# Patient Record
Sex: MALE | Race: BLACK OR AFRICAN AMERICAN | NOT HISPANIC OR LATINO | Employment: FULL TIME | URBAN - METROPOLITAN AREA
[De-identification: names, ages, dates, MRNs, and addresses within clinical notes are randomized per-mention and may not be internally consistent; named-entity substitution may affect disease eponyms.]

---

## 2019-05-07 ENCOUNTER — OFFICE VISIT CONVERTED (OUTPATIENT)
Dept: FAMILY MEDICINE CLINIC | Facility: CLINIC | Age: 54
End: 2019-05-07
Attending: PHYSICIAN ASSISTANT

## 2021-01-05 ENCOUNTER — CONVERSION ENCOUNTER (OUTPATIENT)
Dept: FAMILY MEDICINE CLINIC | Facility: CLINIC | Age: 56
End: 2021-01-05

## 2021-01-05 ENCOUNTER — OFFICE VISIT CONVERTED (OUTPATIENT)
Dept: FAMILY MEDICINE CLINIC | Facility: CLINIC | Age: 56
End: 2021-01-05
Attending: PHYSICIAN ASSISTANT

## 2021-01-08 ENCOUNTER — OFFICE VISIT CONVERTED (OUTPATIENT)
Dept: ORTHOPEDIC SURGERY | Facility: CLINIC | Age: 56
End: 2021-01-08
Attending: ORTHOPAEDIC SURGERY

## 2021-05-10 NOTE — H&P
History and Physical      Patient Name: Jai Dow   Patient ID: [id number]   Sex: Male   YOB: 1965    Primary Care Provider: Cuauhtemoc Padilla PA-C   Referring Provider: Cuauhtemoc Padilla PA-C    Visit Date: January 8, 2021    Provider: Rex Peterson MD   Location: [facility]   Location Address: [address] [place] [address]  [id number]   Location Phone: [phone]          Chief Complaint  · Right wrist pain      History Of Present Illness  aJi Dow is a 55 year old /Black male who presents today to [facility].      Patient presents today for an evaluation of right wrist pain. Patient has no known injury or trauma to his right wrist. He states that he had a sudden onset of pain that started occurring a couple of months ago. Patient currently works at home where he does a lot of typing. Patient has to take breaks in-between typing due to the wrist pain. In the last week, patient states his wrist got stuck and he couldn't move it. Patient has noticed swelling in his right wrist in the past 2 weeks. He states he has tried buying multiple different items to keep his wrist comfortable while typing but they have been ineffective.       Past Medical History  Arthritis; Bilateral knee pain; Hypertension; Insomnia; Migraine; PTSD         Past Surgical History  Colonoscopy; Left total knee replacement; Right total knee replacement         Medication List  amitriptyline 10 mg oral tablet; cyclobenzaprine 10 mg oral tablet; hydrochlorothiazide 25 mg oral tablet; ibuprofen 800 mg oral tablet; lisinopril 40 mg oral tablet; Medrol (Chance) 4 mg oral tablets,dose pack; prazosin 1 mg oral capsule; sertraline 100 mg oral tablet; sertraline 25 mg oral tablet; Voltaren 1 % topical gel; Zofran 4 mg oral tablet; zolmitriptan 2.5 mg oral tablet,disintegrating         Allergy List  NO KNOWN DRUG ALLERGIES       Allergies Reconciled  Family Medical History  Breast Neoplasm, Malignant;  "CVA (Cerebrovascular accident); Hypertension         Social History  Alcohol (Never); ; No known infection risk; Tobacco (Former)         Immunizations  Name Date Admin   Influenza 01/05/2021   Influenza 10/01/2018         Review of Systems  · Constitutional  o Denies  o : fever, chills, weight loss  · Cardiovascular  o Denies  o : chest pain, shortness of breath  · Gastrointestinal  o Denies  o : liver disease, heartburn, nausea, blood in stools  · Genitourinary  o Denies  o : painful urination, blood in urine  · Integument  o Denies  o : rash, itching  · Neurologic  o Denies  o : headache, weakness, loss of consciousness  · Musculoskeletal  o Denies  o : painful, swollen joints  · Psychiatric  o Denies  o : drug/alcohol addiction, anxiety, depression      Vitals  Date Time BP Position Site L\R Cuff Size HR RR TEMP (F) WT  HT  BMI kg/m2 BSA m2 O2 Sat FR L/min FiO2 HC       01/08/2021 03:18 PM      75 - R   222lbs 0oz 5'  11\" 30.96 2.25 98 %            Physical Examination  · Constitutional  o Appearance  o : well developed, well-nourished, no obvious deformities present  · Head and Face  o Head  o :   § Inspection  § : normocephalic  o Face  o :   § Inspection  § : no facial lesions  · Eyes  o Conjunctivae  o : conjunctivae normal  o Sclerae  o : sclerae white  · Ears, Nose, Mouth and Throat  o Ears  o :   § External Ears  § : appearance within normal limits  § Hearing  § : intact  o Nose  o :   § External Nose  § : appearance normal  · Neck  o Inspection/Palpation  o : normal appearance  o Range of Motion  o : full range of motion  · Respiratory  o Respiratory Effort  o : breathing unlabored  o Inspection of Chest  o : normal appearance  o Auscultation of Lungs  o : no audible wheezing or rales  · Cardiovascular  o Heart  o : regular rate  · Gastrointestinal  o Abdominal Examination  o : soft and non-tender  · Skin and Subcutaneous Tissue  o General Inspection  o : intact, no " rashes  · Psychiatric  o General  o : Alert and oriented x3  o Judgement and Insight  o : judgment and insight intact  o Mood and Affect  o : mood normal, affect appropriate  · Right Wrist  o Inspection  o : Sensation grossly intact. Neurovascular intact. Skin intact. Tender to palpation of the wrist. Swelling over first dorsal wrist compartment. Positive Finkelsteins. Full flexion and extension with pain of the wrist. Patient able to make a fist and wiggle fingers. No skin discoloration or atrophy. Radial pulse 2+, ulnar pulse 2+.  · Injection Note/Aspiration Note  o Site  o : right wrist  o Procedure  o : Procedure: After educating the patient, patient gave consent for procedure. After using Chloraprep, the joint space was injected. The patient tolerated the procedure well.   o Medication  o : 80 mg of DepoMedrol with 1cc of 1% Lidocaine  · In Office Procedures  o View  o : AP/LATERAL  o Site  o : right, wrist   o Indication  o : Right arm pain   o Study  o : X-rays ordered, taken in the office, and reviewed today.  o Xray  o : No osseous abnormalities of the right wrist.           Assessment  · Pain: Wrist     719.43/M25.539  · De Quervain's tenosynovitis, right     727.04/M65.4      Plan  · Orders  o Depo-Medrol injection 80mg () - 719.43/M25.539 - 01/08/2021   Lot [id number] Exp 11 2021 [employer] Administered by RADHA Peterson MD  o Arthrocentesis of wrist (20605) - 719.43/M25.539 - 01/08/2021   Lot [id number] Exp 03 01 2022 [employer] Administered by RADHA Peterson MD  o Wrist (Right) 2 views X-Ray [facility] ([id number]) - 719.43/M25.539 - 01/08/2021  · Medications  o Medications have been Reconciled  o Transition of Care or Provider Policy  · Instructions  o Dr. Peterson saw and examined the patient and agrees with plan.   o X-rays reviewed by Dr. Peterson.  o Reviewed the patient's Past Medical, Social, and Family history as well as the ROS at today's visit, no changes.  o Call or return if worsening symptoms.  o Exercise handout  given.  o Follow Up PRN.  o This note was transcribed by Radha De Leon. demetris  o Discussed diagnosis and treatment options with the patient. Patient received a De Quervain's tenosynovitis injection in the right wrist and tolerated it well.   · Referrals  o ID: [id number] Date: 01/07/2021 Type: Inbound  Specialty: Orthopedic Surgery            Electronically Signed by: Radha De Leon-, Other -Author on January 11, 2021 08:25:25 AM  Electronically Co-signed by: Rex Peterson MD -Reviewer on January 11, 2021 10:35:54 PM

## 2021-05-14 VITALS — WEIGHT: 222 LBS | BODY MASS INDEX: 31.08 KG/M2 | HEIGHT: 71 IN | HEART RATE: 75 BPM | OXYGEN SATURATION: 98 %

## 2021-05-14 VITALS
SYSTOLIC BLOOD PRESSURE: 124 MMHG | HEART RATE: 84 BPM | DIASTOLIC BLOOD PRESSURE: 86 MMHG | OXYGEN SATURATION: 98 % | HEIGHT: 70 IN | WEIGHT: 228.19 LBS | BODY MASS INDEX: 32.67 KG/M2

## 2021-05-14 NOTE — PROGRESS NOTES
Progress Note      Patient Name: Jai Dow   Patient ID: [id number]   Sex: Male   YOB: 1965    Primary Care Provider: Cuauhtemoc Padilla PA-C   Referring Provider: Cuauhtemoc Padilla PA-C    Visit Date: January 5, 2021    Provider: Cuauhtemoc Padilla PA-C   Location: [facility]   Location Address: [address] [place] [address], [address]  [place], [place]  [id number]   Location Phone: [phone]          Chief Complaint  · (R) Wrist pain      History Of Present Illness  Jai Dow is a 55 year old /Black male who presents for evaluation and treatment of: right wrist pain      Pt presents today for (R) wrist pain, pt states this started a couple of months ago.     Pt denies any injury. Pt describes the pain as achy and sore, rates it a 4/10. Pt states he has trouble moving his wrist, he will get a sharp, tingly feeling with motion. Pt has been wearing a brace on wrist for the past month.     Pt is requesting lab work, wanting PSA checked.     Flu-today in office    Pt has been using splint and ice and motrin 800mg it has helped minimally  He denies any spec injury but uses his keyboard more than ever.       Past Medical History  Disease Name Date Onset Notes   Arthritis --  --    Bilateral knee pain --  --    Hypertension --  --    Insomnia --  --    Migraine --  --    PTSD --  --          Past Surgical History  Procedure Name Date Notes   Colonoscopy 2015 Normal per pt ([facility])   Left total knee replacement 2018 Dr. Leopoldo Jacobsen ([facility])   Right total knee replacement 2017 Dr. Leopoldo Jacobsen ([facility])         Medication List  Name Date Started Instructions   amitriptyline 10 mg oral tablet  take 1 tablet by oral route 2 times a day   cyclobenzaprine 10 mg oral tablet  take 1 tablet (10 mg) by oral route 3 times per day   hydrochlorothiazide 25 mg oral tablet  take 1 tablet (25 mg) by oral route once daily   ibuprofen 800 mg oral tablet  take 1 tablet (800  "mg) by oral route 3 times per day with food   lisinopril 40 mg oral tablet  take 1 tablet (40 mg) by oral route once daily   prazosin 1 mg oral capsule  take 1 capsule by oral route daily   sertraline 100 mg oral tablet  take 1 tab (125MG total) by oral route once daily   sertraline 25 mg oral tablet  take 1 tab (125MG total) by oral route once daily   Voltaren 1 % topical gel  apply 2 gram to the affected area(s) by topical route 4 times per day   Zofran 4 mg oral tablet  take 1 tablet by oral route PRN for migraines   zolmitriptan 2.5 mg oral tablet,disintegrating  dissolve 1 tablet by oral route once         Allergy List  Allergen Name Date Reaction Notes   NO KNOWN DRUG ALLERGIES --  --  --          Family Medical History  Disease Name Relative/Age Notes   Breast Neoplasm, Malignant Mother/   --    CVA (Cerebrovascular accident) Brother/  Father/  Mother/   --    Hypertension Father/   --          Social History  Finding Status Start/Stop Quantity Notes   Alcohol Never --/-- --  --     --  --/-- --  --    No known infection risk --  --/-- --  --    Tobacco Former 20/40 0.5 PPD --          Immunizations  NameDate Admin Mfg Trade Name Lot Number Route Inj VIS Given VIS Publication   [id number]/05/2021 [facility] Fluzone Quadrivalent [id number] IM LD 01/05/2021 08/15/2019   Comments: pt tolerated well         Review of Systems  · Constitutional  o Denies  o : fever, fatigue, weight loss, weight gain  · Cardiovascular  o Denies  o : lower extremity edema, claudication, chest pressure, palpitations  · Respiratory  o Denies  o : shortness of breath, wheezing, cough, hemoptysis, dyspnea on exertion  · Gastrointestinal  o Denies  o : nausea, vomiting, diarrhea, constipation, abdominal pain      Vitals  Date Time BP Position Site L\R Cuff Size HR RR TEMP (F) WT  HT  BMI kg/m2 BSA m2 O2 Sat FR L/min FiO2 HC       01/05/2021 10:00 /86 Sitting    84 - R   228lbs 3.2oz 5'  10.5\" 32.28 2.27 98 %            Physical " Examination  · Constitutional  o Appearance  o : well developed, well-nourished, no acute distress  · Head and Face  o Head  o : normocephalic, atraumatic  · Neck  o Inspection/Palpation  o : normal appearance, no masses or tenderness, trachea midline  o Thyroid  o : gland size normal, nontender, no nodules or masses present on palpation  · Respiratory  o Respiratory Effort  o : breathing unlabored  o Inspection of Chest  o : chest rise symmetric bilaterally  o Auscultation of Lungs  o : clear to auscultation bilaterally throughout inspiration and expiration  · Cardiovascular  o Heart  o :   § Auscultation of Heart  § : regular rate and rhythm, no murmurs, gallops or rubs  o Peripheral Vascular System  o :   § Extremities  § : no edema  · Lymphatic  o Neck  o : no cervical lymphadenopathy, no supraclavicular lymphadenopathy  · Psychiatric  o Mood and Affect  o : mood normal, affect appropriate     Right Wrist - neg tinels, phalens, normal sonia, pos finklestein           Assessment  · Screening for depression     V79.0/Z13.89  · Need for influenza vaccination     V04.81/Z23  · Right wrist pain     719.43/M25.531  · Right De Quervain's disease (tenosynovitis)     727.04/M65.4      Plan  · Orders  o ACO-18: Negative screen for clinical depression using a standardized tool () - V79.0/Z13.89 - 01/05/2021  o Immunization Admin Fee (Single) ([facility]) (86494) - V04.81/Z23 - 01/05/2021  o Fluzone Quadrivalent Vaccine, age 6 months + (87035) - V04.81/Z23 - 01/05/2021   Vaccine - Influenza; Dose: 0.5; Site: Left Deltoid; Route: Intramuscular; Date: 01/05/2021 10:07:00; Exp: 06/30/2021; Lot: [id number]; Mfg: [employer]ofi pasteur; TradeName: Fluzone Quadrivalent; Administered By: Hira Goyal MA; Comment: pt tolerated well  o ACO-14: Influenza immunization administered or previously received () - V04.81/Z23 - 01/05/2021  o ACO-39: Current medications updated and reviewed (1159F, ) - - 01/05/2021  o PHYSICAL  THERAPY/OCCUPATIONAL THERAPY CONSULTATION (Evaluate and Treat) (PTOTR) - 727.04/M65.4 - 01/05/2021  o ORTHOPEDIC CONSULTATION (ORTHO) - 727.04/M65.4 - 01/05/2021  · Medications  o Medrol (Chance) 4 mg oral tablets,dose pack   SIG: take by oral route as directed per package instructions   DISP: (1) Packet with 0 refills  Prescribed on 01/05/2021     o Medications have been Reconciled  o Transition of Care or Provider Policy  · Instructions  o Depression Screen completed and scanned into the EMR under the designated folder within the patient's documents.  o Today's PHQ-9 result is _9__  o Take all medications as prescribed/directed.  o Patient was educated/instructed on their diagnosis, treatment and medications prior to discharge from the clinic today.  o Discussed Covid-19 precautions including, but not limited to, social distancing, avoid touching your face, and hand washing.   o RICE  · Disposition  o Call or Return if symptoms worsen or persist.  o Care Transition            Electronically Signed by: Cuauhtemoc Padilla PA-C -Author on January 6, 2021 02:18:12 PM

## 2021-05-15 VITALS
BODY MASS INDEX: 32.14 KG/M2 | HEART RATE: 94 BPM | DIASTOLIC BLOOD PRESSURE: 79 MMHG | HEIGHT: 70 IN | WEIGHT: 224.5 LBS | SYSTOLIC BLOOD PRESSURE: 130 MMHG | OXYGEN SATURATION: 97 %

## 2022-06-27 ENCOUNTER — OFFICE VISIT (OUTPATIENT)
Dept: FAMILY MEDICINE CLINIC | Facility: CLINIC | Age: 57
End: 2022-06-27

## 2022-06-27 VITALS
WEIGHT: 215 LBS | OXYGEN SATURATION: 98 % | BODY MASS INDEX: 30.1 KG/M2 | HEIGHT: 71 IN | HEART RATE: 61 BPM | DIASTOLIC BLOOD PRESSURE: 80 MMHG | SYSTOLIC BLOOD PRESSURE: 152 MMHG

## 2022-06-27 DIAGNOSIS — J01.10 ACUTE NON-RECURRENT FRONTAL SINUSITIS: ICD-10-CM

## 2022-06-27 DIAGNOSIS — J02.9 SORE THROAT: Primary | ICD-10-CM

## 2022-06-27 PROBLEM — G43.909 MIGRAINE: Status: ACTIVE | Noted: 2022-06-27

## 2022-06-27 PROBLEM — G47.00 INSOMNIA: Status: ACTIVE | Noted: 2022-06-27

## 2022-06-27 PROBLEM — I10 HTN (HYPERTENSION): Status: ACTIVE | Noted: 2022-06-27

## 2022-06-27 PROBLEM — M19.90 ARTHRITIS: Status: ACTIVE | Noted: 2022-06-27

## 2022-06-27 PROBLEM — G47.33 OSA (OBSTRUCTIVE SLEEP APNEA): Status: ACTIVE | Noted: 2022-06-27

## 2022-06-27 PROBLEM — Z96.651 S/P TKR (TOTAL KNEE REPLACEMENT) USING CEMENT, RIGHT: Status: ACTIVE | Noted: 2017-12-28

## 2022-06-27 PROBLEM — F43.10 POSTTRAUMATIC STRESS DISORDER: Status: ACTIVE | Noted: 2022-06-27

## 2022-06-27 LAB
EXPIRATION DATE: NORMAL
EXPIRATION DATE: NORMAL
FLUAV AG UPPER RESP QL IA.RAPID: NOT DETECTED
FLUBV AG UPPER RESP QL IA.RAPID: NOT DETECTED
INTERNAL CONTROL: NORMAL
INTERNAL CONTROL: NORMAL
Lab: NORMAL
Lab: NORMAL
S PYO AG THROAT QL: NEGATIVE
SARS-COV-2 AG UPPER RESP QL IA.RAPID: NOT DETECTED

## 2022-06-27 PROCEDURE — 87428 SARSCOV & INF VIR A&B AG IA: CPT | Performed by: PHYSICIAN ASSISTANT

## 2022-06-27 PROCEDURE — 87880 STREP A ASSAY W/OPTIC: CPT | Performed by: PHYSICIAN ASSISTANT

## 2022-06-27 PROCEDURE — 99213 OFFICE O/P EST LOW 20 MIN: CPT | Performed by: PHYSICIAN ASSISTANT

## 2022-06-27 RX ORDER — METHOCARBAMOL 500 MG/1
500 TABLET, FILM COATED ORAL 4 TIMES DAILY
COMMUNITY
End: 2022-06-27 | Stop reason: ALTCHOICE

## 2022-06-27 RX ORDER — HYDROCHLOROTHIAZIDE 25 MG/1
TABLET ORAL
COMMUNITY

## 2022-06-27 RX ORDER — SERTRALINE HYDROCHLORIDE 100 MG/1
200 TABLET, FILM COATED ORAL
COMMUNITY

## 2022-06-27 RX ORDER — ZOLMITRIPTAN 2.5 MG/1
TABLET, ORALLY DISINTEGRATING ORAL
COMMUNITY
End: 2022-10-26

## 2022-06-27 RX ORDER — LISINOPRIL 40 MG/1
TABLET ORAL
COMMUNITY
End: 2023-01-20 | Stop reason: SDUPTHER

## 2022-06-27 RX ORDER — AZITHROMYCIN 250 MG/1
TABLET, FILM COATED ORAL
Qty: 6 TABLET | Refills: 0 | Status: SHIPPED | OUTPATIENT
Start: 2022-06-27 | End: 2022-08-25

## 2022-06-27 RX ORDER — AMITRIPTYLINE HYDROCHLORIDE 10 MG/1
TABLET, FILM COATED ORAL
COMMUNITY

## 2022-06-27 RX ORDER — PRAZOSIN HYDROCHLORIDE 2 MG/1
2 CAPSULE ORAL DAILY
COMMUNITY

## 2022-06-27 RX ORDER — IBUPROFEN 800 MG/1
TABLET ORAL
COMMUNITY

## 2022-06-27 RX ORDER — ONDANSETRON 4 MG/1
TABLET, FILM COATED ORAL
COMMUNITY

## 2022-06-27 RX ORDER — CYCLOBENZAPRINE HCL 10 MG
TABLET ORAL
COMMUNITY
End: 2022-10-26

## 2022-08-12 ENCOUNTER — HOSPITAL ENCOUNTER (EMERGENCY)
Facility: HOSPITAL | Age: 57
Discharge: HOME | End: 2022-08-12
Attending: EMERGENCY MEDICINE | Admitting: EMERGENCY MEDICINE

## 2022-08-12 VITALS
RESPIRATION RATE: 18 BRPM | BODY MASS INDEX: 30.25 KG/M2 | TEMPERATURE: 98.1 F | DIASTOLIC BLOOD PRESSURE: 80 MMHG | HEART RATE: 88 BPM | WEIGHT: 216.05 LBS | SYSTOLIC BLOOD PRESSURE: 128 MMHG | HEIGHT: 71 IN | OXYGEN SATURATION: 100 %

## 2022-08-12 DIAGNOSIS — F41.9 ANXIETY: Primary | ICD-10-CM

## 2022-08-12 DIAGNOSIS — R25.2 MUSCLE CRAMPS: ICD-10-CM

## 2022-08-12 LAB
ALBUMIN SERPL-MCNC: 4.1 G/DL (ref 3.5–5.2)
ALBUMIN/GLOB SERPL: 1.1 G/DL
ALP SERPL-CCNC: 50 U/L (ref 39–117)
ALT SERPL W P-5'-P-CCNC: 11 U/L (ref 1–41)
ANION GAP SERPL CALCULATED.3IONS-SCNC: 15.1 MMOL/L (ref 5–15)
AST SERPL-CCNC: 14 U/L (ref 1–40)
BASOPHILS # BLD AUTO: 0.07 10*3/MM3 (ref 0–0.2)
BASOPHILS NFR BLD AUTO: 1 % (ref 0–1.5)
BILIRUB SERPL-MCNC: 0.3 MG/DL (ref 0–1.2)
BUN SERPL-MCNC: 21 MG/DL (ref 6–20)
BUN/CREAT SERPL: 15.3 (ref 7–25)
CALCIUM SPEC-SCNC: 9.8 MG/DL (ref 8.6–10.5)
CHLORIDE SERPL-SCNC: 99 MMOL/L (ref 98–107)
CO2 SERPL-SCNC: 21.9 MMOL/L (ref 22–29)
CREAT SERPL-MCNC: 1.37 MG/DL (ref 0.76–1.27)
DEPRECATED RDW RBC AUTO: 37.2 FL (ref 37–54)
EGFRCR SERPLBLD CKD-EPI 2021: 59.4 ML/MIN/1.73
EOSINOPHIL # BLD AUTO: 0.14 10*3/MM3 (ref 0–0.4)
EOSINOPHIL NFR BLD AUTO: 2.1 % (ref 0.3–6.2)
ERYTHROCYTE [DISTWIDTH] IN BLOOD BY AUTOMATED COUNT: 13.1 % (ref 12.3–15.4)
GLOBULIN UR ELPH-MCNC: 3.6 GM/DL
GLUCOSE SERPL-MCNC: 206 MG/DL (ref 65–99)
HCT VFR BLD AUTO: 37.7 % (ref 37.5–51)
HGB BLD-MCNC: 13.7 G/DL (ref 13–17.7)
HOLD SPECIMEN: 11
HOLD SPECIMEN: 11
IMM GRANULOCYTES # BLD AUTO: 0.03 10*3/MM3 (ref 0–0.05)
IMM GRANULOCYTES NFR BLD AUTO: 0.4 % (ref 0–0.5)
LYMPHOCYTES # BLD AUTO: 2.05 10*3/MM3 (ref 0.7–3.1)
LYMPHOCYTES NFR BLD AUTO: 30.7 % (ref 19.6–45.3)
MAGNESIUM SERPL-MCNC: 2.1 MG/DL (ref 1.6–2.6)
MCH RBC QN AUTO: 28.7 PG (ref 26.6–33)
MCHC RBC AUTO-ENTMCNC: 36.3 G/DL (ref 31.5–35.7)
MCV RBC AUTO: 79 FL (ref 79–97)
MONOCYTES # BLD AUTO: 0.77 10*3/MM3 (ref 0.1–0.9)
MONOCYTES NFR BLD AUTO: 11.5 % (ref 5–12)
NEUTROPHILS NFR BLD AUTO: 3.62 10*3/MM3 (ref 1.7–7)
NEUTROPHILS NFR BLD AUTO: 54.3 % (ref 42.7–76)
NRBC BLD AUTO-RTO: 0 /100 WBC (ref 0–0.2)
PLATELET # BLD AUTO: 224 10*3/MM3 (ref 140–450)
PMV BLD AUTO: 10.3 FL (ref 6–12)
POTASSIUM SERPL-SCNC: 3.5 MMOL/L (ref 3.5–5.2)
PROT SERPL-MCNC: 7.7 G/DL (ref 6–8.5)
RBC # BLD AUTO: 4.77 10*6/MM3 (ref 4.14–5.8)
SODIUM SERPL-SCNC: 136 MMOL/L (ref 136–145)
TROPONIN T SERPL-MCNC: <0.01 NG/ML (ref 0–0.03)
WBC NRBC COR # BLD: 6.68 10*3/MM3 (ref 3.4–10.8)
WHOLE BLOOD HOLD COAG: 11
WHOLE BLOOD HOLD SPECIMEN: 11

## 2022-08-12 PROCEDURE — 25010000002 ORPHENADRINE CITRATE PER 60 MG: Performed by: EMERGENCY MEDICINE

## 2022-08-12 PROCEDURE — 25010000002 KETOROLAC TROMETHAMINE PER 15 MG: Performed by: EMERGENCY MEDICINE

## 2022-08-12 PROCEDURE — 80053 COMPREHEN METABOLIC PANEL: CPT | Performed by: EMERGENCY MEDICINE

## 2022-08-12 PROCEDURE — 93010 ELECTROCARDIOGRAM REPORT: CPT | Performed by: INTERNAL MEDICINE

## 2022-08-12 PROCEDURE — 99284 EMERGENCY DEPT VISIT MOD MDM: CPT

## 2022-08-12 PROCEDURE — 93005 ELECTROCARDIOGRAM TRACING: CPT | Performed by: EMERGENCY MEDICINE

## 2022-08-12 PROCEDURE — 83735 ASSAY OF MAGNESIUM: CPT | Performed by: EMERGENCY MEDICINE

## 2022-08-12 PROCEDURE — 93005 ELECTROCARDIOGRAM TRACING: CPT

## 2022-08-12 PROCEDURE — 96375 TX/PRO/DX INJ NEW DRUG ADDON: CPT

## 2022-08-12 PROCEDURE — 85025 COMPLETE CBC W/AUTO DIFF WBC: CPT

## 2022-08-12 PROCEDURE — 96374 THER/PROPH/DIAG INJ IV PUSH: CPT

## 2022-08-12 PROCEDURE — 84484 ASSAY OF TROPONIN QUANT: CPT | Performed by: EMERGENCY MEDICINE

## 2022-08-12 PROCEDURE — 36415 COLL VENOUS BLD VENIPUNCTURE: CPT

## 2022-08-12 RX ORDER — SODIUM CHLORIDE 0.9 % (FLUSH) 0.9 %
10 SYRINGE (ML) INJECTION AS NEEDED
Status: DISCONTINUED | OUTPATIENT
Start: 2022-08-12 | End: 2022-08-12 | Stop reason: HOSPADM

## 2022-08-12 RX ORDER — KETOROLAC TROMETHAMINE 30 MG/ML
30 INJECTION, SOLUTION INTRAMUSCULAR; INTRAVENOUS ONCE
Status: COMPLETED | OUTPATIENT
Start: 2022-08-12 | End: 2022-08-12

## 2022-08-12 RX ORDER — ORPHENADRINE CITRATE 30 MG/ML
60 INJECTION INTRAMUSCULAR; INTRAVENOUS ONCE
Status: COMPLETED | OUTPATIENT
Start: 2022-08-12 | End: 2022-08-12

## 2022-08-12 RX ADMIN — SODIUM CHLORIDE 1000 ML: 9 INJECTION, SOLUTION INTRAVENOUS at 03:08

## 2022-08-12 RX ADMIN — KETOROLAC TROMETHAMINE 30 MG: 30 INJECTION, SOLUTION INTRAMUSCULAR; INTRAVENOUS at 03:08

## 2022-08-12 RX ADMIN — ORPHENADRINE CITRATE 60 MG: 60 INJECTION INTRAMUSCULAR; INTRAVENOUS at 03:07

## 2022-08-12 NOTE — ED PROVIDER NOTES
Time: 2:24 AM EDT  Arrived by: ambulance  Chief Complaint: syncope, panic attack  History provided by: patient, spouse  History is limited by: not applicable     History of Present Illness:  Patient is a 57 y.o. year old male who presents to the emergency department with syncope and panic attack. Per the patient's spouse, the pt woke up from a nightmare at approximately 0100 and had a panic attack. Pt's spouse states the pt went to the bathroom after waking up. Per the pt's spouse, the pt had a whole body spasm, his legs gave out, and he passed out. Pt's spouse reports that she followed the pt into the bathroom and helped him down to the floor when he passed out. His spouse states the pt had labored breathing. Pt states he was alert during the episode, remembers what happened, denies head injury, and is not currently anxious. Pt denies nausea, vomiting, diarrhea, or chest pain.      HPI    Similar Symptoms Previously: no  Recently seen: no      Patient Care Team  Primary Care Provider: Neena Vazquez APRN    Past Medical History:     Allergies   Allergen Reactions   • Amoxil [Amoxicillin] Angioedema     Past Medical History:   Diagnosis Date   • PTSD (post-traumatic stress disorder)      History reviewed. No pertinent surgical history.  History reviewed. No pertinent family history.    Home Medications:  Prior to Admission medications    Medication Sig Start Date End Date Taking? Authorizing Provider   amitriptyline (ELAVIL) 10 MG tablet amitriptyline 10 mg oral tablet take 1 tablet by oral route 2 times a day   Active    Provider, MD [facility]   azithromycin (Zithromax Z-Chance) 250 MG tablet Take 2 tablets by mouth on day 1, then 1 tablet daily on days 2-5 6/27/22   Diana Downing PA   cyclobenzaprine (FLEXERIL) 10 MG tablet cyclobenzaprine 10 mg oral tablet take 1 tablet (10 mg) by oral route 3 times per day   Active    Provider, MD [facility]   hydroCHLOROthiazide (HYDRODIURIL) 25 MG tablet  hydrochlorothiazide 25 mg oral tablet take 1 tablet (25 mg) by oral route once daily   Active    [facility] MD   ibuprofen (ADVIL,MOTRIN) 800 MG tablet ibuprofen 800 mg oral tablet take 1 tablet (800 mg) by oral route 3 times per day with food   Active    [facility] Ray MD   lisinopril (PRINIVIL,ZESTRIL) 40 MG tablet lisinopril 40 mg oral tablet take 1 tablet (40 mg) by oral route once daily   Active    [facility] MD   ondansetron (ZOFRAN) 4 MG tablet Zofran 4 mg oral tablet take 1 tablet by oral route PRN for migraines   Active    [facility] MD   prazosin (MINIPRESS) 2 MG capsule Take 2 mg by mouth Daily.    [facility] MD   sertraline (ZOLOFT) 100 MG tablet sertraline 100 mg oral tablet take 1 tab (125MG total) by oral route once daily   Active    [facility] MD   ZOLMitriptan (ZOMIG-ZMT) 2.5 MG disintegrating tablet zolmitriptan 2.5 mg oral tablet,disintegrating dissolve  1 tablet by oral route once   Active    [facility] MD        Social History:   Social History     Tobacco Use   • Smoking status: Never Smoker   • Smokeless tobacco: Never Used   Substance Use Topics   • Drug use: Never     Recent travel: not applicable    Review of Systems:  Review of Systems   Constitutional: Negative for chills and fever.   HENT: Negative for congestion, rhinorrhea and sore throat.    Eyes: Negative for pain and visual disturbance.   Respiratory: Negative for apnea, cough, chest tightness and shortness of breath.         + labored breathing   Cardiovascular: Negative for chest pain and palpitations.   Gastrointestinal: Negative for abdominal pain, diarrhea, nausea and vomiting.   Genitourinary: Negative for difficulty urinating and dysuria.   Musculoskeletal: Negative for joint swelling and myalgias.   Skin: Negative for color change.   Neurological: Positive for syncope. Negative for seizures and headaches.        + spasm  - head injury  "  Psychiatric/Behavioral: Positive for sleep disturbance (nightmare). The patient is nervous/anxious.    All other systems reviewed and are negative.       Physical Exam:  /80 (BP Location: Right arm, Patient Position: Lying)   Pulse 88   Temp 98.1 °F (36.7 °C) (Oral)   Resp 18   Ht 180.3 cm (71\")   Wt 98 kg (216 lb 0.8 oz)   SpO2 100%   BMI 30.13 kg/m²     Physical Exam  Vitals and nursing note reviewed.   Constitutional:       General: He is not in acute distress.     Appearance: Normal appearance. He is not toxic-appearing.   HENT:      Head: Normocephalic and atraumatic.      Jaw: There is normal jaw occlusion.   Eyes:      General: Lids are normal.      Extraocular Movements: Extraocular movements intact.      Conjunctiva/sclera: Conjunctivae normal.      Pupils: Pupils are equal, round, and reactive to light.   Cardiovascular:      Rate and Rhythm: Normal rate and regular rhythm.      Pulses: Normal pulses.      Heart sounds: Normal heart sounds.   Pulmonary:      Effort: Pulmonary effort is normal. No respiratory distress.      Breath sounds: Normal breath sounds. No wheezing or rhonchi.   Abdominal:      General: Abdomen is flat.      Palpations: Abdomen is soft.      Tenderness: There is no abdominal tenderness. There is no guarding or rebound.   Musculoskeletal:         General: Normal range of motion.      Cervical back: Normal range of motion and neck supple.      Right lower leg: No edema.      Left lower leg: No edema.   Skin:     General: Skin is warm and dry.   Neurological:      Mental Status: He is alert and oriented to person, place, and time. Mental status is at baseline.      Comments: No neurological deficits   Psychiatric:         Mood and Affect: Mood normal.                Medications in the Emergency Department:  Medications   sodium chloride 0.9 % flush 10 mL (has no administration in time range)   sodium chloride 0.9 % bolus 1,000 mL (0 mL Intravenous Stopped 8/12/22 0346) "   ketorolac (TORADOL) injection 30 mg (30 mg Intravenous Given 8/12/22 0308)   orphenadrine (NORFLEX) injection 60 mg (60 mg Intravenous Given 8/12/22 0307)        Labs  Lab Results (last 24 hours)     Procedure Component Value Units Date/Time    CBC & Differential [[id number]]  (Abnormal) Collected: 08/12/22 0141    Specimen: Blood Updated: 08/12/22 0150    Narrative:      The following orders were created for panel order CBC & Differential.  Procedure                               Abnormality         Status                     ---------                               -----------         ------                     CBC Auto Differential[[id number]]        Abnormal            Final result                 Please view results for these tests on the individual orders.    Comprehensive Metabolic Panel [[id number]]  (Abnormal) Collected: 08/12/22 0141    Specimen: Blood Updated: 08/12/22 0209     Glucose 206 mg/dL      BUN 21 mg/dL      Creatinine 1.37 mg/dL      Sodium 136 mmol/L      Potassium 3.5 mmol/L      Chloride 99 mmol/L      CO2 21.9 mmol/L      Calcium 9.8 mg/dL      Total Protein 7.7 g/dL      Albumin 4.10 g/dL      ALT (SGPT) 11 U/L      AST (SGOT) 14 U/L      Alkaline Phosphatase 50 U/L      Total Bilirubin 0.3 mg/dL      Globulin 3.6 gm/dL      A/G Ratio 1.1 g/dL      BUN/Creatinine Ratio 15.3     Anion Gap 15.1 mmol/L      eGFR 59.4 mL/min/1.73      Comment: National Kidney Foundation and American Society of Nephrology (ASN) Task Force recommended calculation based on the Chronic Kidney Disease Epidemiology Collaboration (CKD-EPI) equation refit without adjustment for race.       Narrative:      GFR Normal >60  Chronic Kidney Disease <60  Kidney Failure <15      Magnesium [[id number]]  (Normal) Collected: 08/12/22 0141    Specimen: Blood Updated: 08/12/22 0209     Magnesium 2.1 mg/dL     Troponin [[id number]]  (Normal) Collected: 08/12/22 0141    Specimen: Blood Updated: 08/12/22 0209     Troponin T <0.010 ng/mL      Narrative:      Troponin T Reference Range:  <= 0.03 ng/mL-   Negative for AMI  >0.03 ng/mL-     Abnormal for myocardial necrosis.  Clinicians would have to utilize clinical acumen, EKG, Troponin and serial changes to determine if it is an Acute Myocardial Infarction or myocardial injury due to an underlying chronic condition.       Results may be falsely decreased if patient taking Biotin.      CBC Auto Differential [[id number]]  (Abnormal) Collected: 08/12/22 0141    Specimen: Blood Updated: 08/12/22 0150     WBC 6.68 10*3/mm3      RBC 4.77 10*6/mm3      Hemoglobin 13.7 g/dL      Hematocrit 37.7 %      MCV 79.0 fL      MCH 28.7 pg      MCHC 36.3 g/dL      RDW 13.1 %      RDW-SD 37.2 fl      MPV 10.3 fL      Platelets 224 10*3/mm3      Neutrophil % 54.3 %      Lymphocyte % 30.7 %      Monocyte % 11.5 %      Eosinophil % 2.1 %      Basophil % 1.0 %      Immature Grans % 0.4 %      Neutrophils, Absolute 3.62 10*3/mm3      Lymphocytes, Absolute 2.05 10*3/mm3      Monocytes, Absolute 0.77 10*3/mm3      Eosinophils, Absolute 0.14 10*3/mm3      Basophils, Absolute 0.07 10*3/mm3      Immature Grans, Absolute 0.03 10*3/mm3      nRBC 0.0 /100 WBC            Imaging:  No Radiology Exams Resulted Within Past 24 Hours    Procedures:  Procedures    Progress  ED Course as of 08/12/22 0426   Fri Aug 12, 2022   0247 ECG 12 Lead  Normal sinus rhythm with rate of 68. QRS normal. LA interval normal. QTc interval is normal. No ST elevation or depression. No T wave abnormalities. No previous available for comparison. This EKG was interpreted by me.  [LD]      ED Course User Index  [LD] Chiki Kate MD                            Medical Decision Making:  MDM  Number of Diagnoses or Management Options  Anxiety  Muscle cramps  Diagnosis management comments: Patient is afebrile nontoxic-appearing.  Vital signs are stable.  At time of evaluation he is sitting in bed in no acute distress.  Patient reports that he woke up after  having nightmare.  He report feeling anxious. He states he had diffuse muscle cramps.  When he went to the restroom he had diffuse muscle cramps and went down.  His wife was behind and was able to help him down.  He did not hit his head or lose consciousness.  Patient remembers this episode.  He states he feels a lot better currently.  He denies any other symptoms other than muscle cramps.  Labs were obtained and showed no significant abnormality.  Patient given pain medication and muscle relaxer.  Reevaluation reports significant improvement in symptoms.  Recommend close follow-up with his primary physician.  Discussed return precautions, discharge instructions and answered all his questions.       Amount and/or Complexity of Data Reviewed  Clinical lab tests: ordered and reviewed  Tests in the radiology section of CPT®: ordered and reviewed    Risk of Complications, Morbidity, and/or Mortality  Presenting problems: low  Management options: low    Patient Progress  Patient progress: stable       Final diagnoses:   Anxiety   Muscle cramps        Disposition:  ED Disposition     ED Disposition   Discharge    Condition   Stable    Comment   --             This medical record created using voice recognition software.           Michael Stevenson  08/12/22 0230       Michael Stevenson  08/12/22 0259       Chiki Kate MD  08/12/22 7016

## 2022-08-13 LAB — QT INTERVAL: 429 MS

## 2022-08-25 ENCOUNTER — OFFICE VISIT (OUTPATIENT)
Dept: FAMILY MEDICINE CLINIC | Facility: CLINIC | Age: 57
End: 2022-08-25

## 2022-08-25 VITALS
DIASTOLIC BLOOD PRESSURE: 95 MMHG | HEART RATE: 68 BPM | WEIGHT: 212 LBS | HEIGHT: 71 IN | SYSTOLIC BLOOD PRESSURE: 171 MMHG | OXYGEN SATURATION: 99 % | BODY MASS INDEX: 29.68 KG/M2

## 2022-08-25 DIAGNOSIS — M79.2 NERVE PAIN: ICD-10-CM

## 2022-08-25 DIAGNOSIS — G89.29 CHRONIC NONINTRACTABLE HEADACHE, UNSPECIFIED HEADACHE TYPE: ICD-10-CM

## 2022-08-25 DIAGNOSIS — M25.512 ACUTE PAIN OF LEFT SHOULDER: ICD-10-CM

## 2022-08-25 DIAGNOSIS — R55 SYNCOPE, UNSPECIFIED SYNCOPE TYPE: Primary | ICD-10-CM

## 2022-08-25 DIAGNOSIS — G43.901 MIGRAINE WITH STATUS MIGRAINOSUS, NOT INTRACTABLE, UNSPECIFIED MIGRAINE TYPE: ICD-10-CM

## 2022-08-25 DIAGNOSIS — Z12.11 ENCOUNTER FOR SCREENING COLONOSCOPY: ICD-10-CM

## 2022-08-25 DIAGNOSIS — R51.9 CHRONIC NONINTRACTABLE HEADACHE, UNSPECIFIED HEADACHE TYPE: ICD-10-CM

## 2022-08-25 PROCEDURE — 80305 DRUG TEST PRSMV DIR OPT OBS: CPT | Performed by: NURSE PRACTITIONER

## 2022-08-25 PROCEDURE — 99214 OFFICE O/P EST MOD 30 MIN: CPT | Performed by: NURSE PRACTITIONER

## 2022-08-25 RX ORDER — GABAPENTIN 300 MG/1
300 CAPSULE ORAL NIGHTLY
Qty: 90 CAPSULE | Refills: 0 | Status: SHIPPED | OUTPATIENT
Start: 2022-08-25

## 2022-08-25 NOTE — PROGRESS NOTES
Chief Complaint  Loss of Consciousness, Headache, Altered Mental Status, and Spasms    Subjective            Jai Dow presents to [facility]  Pt has c/o syncope episodes with muscle weakness/spasms, H/A, itching (under the skin) , and fatigue. Pt went to ER by ambulance on 8/12 due to syncope episode. Pt was released noting this as a panic attack. No imaging was done. Pt states he saw Dr. Mondragon back in 2018 and an MRI was done and MS was found. Nothing was pursued from MRI. Pt brought records from Dr. Mondragon. PT has been having severe H/A 3-4 days a week. Pt states they start as tension H/A and will get worse. Pt is currently on amtriptyline and zolmitriptan for migraines. Pt has been experience muscle cramps and weakness and sever itching. He has tried OTC antihistamines and creams and nothing helps.  He feels it is nerve pain as it starts out as N/T in lower extremities. PT has had his legs give out due to cramps. At times pt will become disoriented and not sure where he is or how to get home. This has occurred when nightmares occur. A new onset of disorientation occurred 1 wk before going to ER on 8/12. Pt was driving and became disoriented. Pt pulled over and called 911 to contact spouse. PT would like a referral for neurology to be further evaluated.   [facility]    He also reports has been having trouble with his left shoulder popping out of socket and wants to see ortho about this.        Past Medical History:   Diagnosis Date   • PTSD (post-traumatic stress disorder)        Allergies   Allergen Reactions   • Amoxil [Amoxicillin] Angioedema        History reviewed. No pertinent surgical history.     Social History     Tobacco Use   • Smoking status: Never Smoker   • Smokeless tobacco: Never Used   Substance Use Topics   • Alcohol use: Not on file       History reviewed. No pertinent family history.     Current Outpatient  "Medications on File Prior to Visit   Medication Sig   • amitriptyline (ELAVIL) 10 MG tablet amitriptyline 10 mg oral tablet take 1 tablet by oral route 2 times a day   Active   • cyclobenzaprine (FLEXERIL) 10 MG tablet cyclobenzaprine 10 mg oral tablet take 1 tablet (10 mg) by oral route 3 times per day   Active   • hydroCHLOROthiazide (HYDRODIURIL) 25 MG tablet hydrochlorothiazide 25 mg oral tablet take 1 tablet (25 mg) by oral route once daily   Active   • ibuprofen (ADVIL,MOTRIN) 800 MG tablet ibuprofen 800 mg oral tablet take 1 tablet (800 mg) by oral route 3 times per day with food   Active   • lisinopril (PRINIVIL,ZESTRIL) 40 MG tablet lisinopril 40 mg oral tablet take 1 tablet (40 mg) by oral route once daily   Active   • ondansetron (ZOFRAN) 4 MG tablet Zofran 4 mg oral tablet take 1 tablet by oral route PRN for migraines   Active   • prazosin (MINIPRESS) 2 MG capsule Take 2 mg by mouth Daily.   • sertraline (ZOLOFT) 100 MG tablet sertraline 100 mg oral tablet take 1 tab (125MG total) by oral route once daily   Active   • ZOLMitriptan (ZOMIG-ZMT) 2.5 MG disintegrating tablet zolmitriptan 2.5 mg oral tablet,disintegrating dissolve  1 tablet by oral route once   Active   • [DISCONTINUED] azithromycin (Zithromax Z-Chance) 250 MG tablet Take 2 tablets by mouth on day 1, then 1 tablet daily on days 2-5     No current facility-administered medications on file prior to visit.       Health Maintenance Due   Topic Date Due   • COLORECTAL CANCER SCREENING  Never done   • ANNUAL PHYSICAL  Never done   • TDAP/TD VACCINES (1 - Tdap) Never done   • ZOSTER VACCINE (1 of 2) Never done   • HEPATITIS C SCREENING  Never done       Objective     /95   Pulse 68   Ht 180.3 cm (71\")   Wt 96.2 kg (212 lb)   SpO2 99%   BMI 29.57 kg/m²       Physical Exam  Constitutional:       General: He is not in acute distress.     Appearance: Normal appearance. He is not ill-appearing.   HENT:      Head: Normocephalic and atraumatic.     "  Right Ear: Tympanic membrane, ear canal and external ear normal.      Left Ear: Tympanic membrane, ear canal and external ear normal.      Nose: Nose normal.   Eyes:      Extraocular Movements: Extraocular movements intact.      Conjunctiva/sclera: Conjunctivae normal.      Pupils: Pupils are equal, round, and reactive to light.   Cardiovascular:      Rate and Rhythm: Normal rate and regular rhythm.      Heart sounds: Normal heart sounds. No murmur heard.  Pulmonary:      Effort: Pulmonary effort is normal. No respiratory distress.      Breath sounds: Normal breath sounds.   Chest:      Chest wall: No tenderness.   Abdominal:      General: There is no distension.      Palpations: There is no mass.      Tenderness: There is no abdominal tenderness. There is no guarding.   Genitourinary:     Rectum: Normal.   Musculoskeletal:         General: No swelling or tenderness. Normal range of motion.      Cervical back: Normal range of motion and neck supple.   Skin:     General: Skin is warm and dry.      Findings: No rash.   Neurological:      General: No focal deficit present.      Mental Status: He is alert and oriented to person, place, and time. Mental status is at baseline.      Cranial Nerves: Cranial nerves are intact.      Motor: Motor function is intact.      Coordination: Coordination is intact.      Gait: Gait is intact. Gait normal.   Psychiatric:         Attention and Perception: Attention normal.         Mood and Affect: Mood normal.         Speech: Speech normal.         Behavior: Behavior normal.         Thought Content: Thought content normal.         Cognition and Memory: Cognition normal.         Judgment: Judgment normal.           Result Review :                           Assessment and Plan        Diagnoses and all orders for this visit:    1. Syncope, unspecified syncope type (Primary)  -     Ambulatory Referral to Neurology    2. Chronic nonintractable headache, unspecified headache type  -      Ambulatory Referral to Neurology    3. Migraine with status migrainosus, not intractable, unspecified migraine type  -     Ambulatory Referral to Neurology    4. Encounter for screening colonoscopy  -     Ambulatory Referral For Screening Colonoscopy    5. Nerve pain  -     POC Urine Drug Screen [facility]  -     gabapentin (Neurontin) 300 MG capsule; Take 1 capsule by mouth Every Night.  Dispense: 90 capsule; Refill: 0    6. Acute pain of left shoulder  -     Ambulatory Referral to Orthopedic Surgery          I spent 30 minutes caring for Jai on this date of service. This time includes time spent by me in the following activities:preparing for the visit, reviewing tests, obtaining and/or reviewing a separately obtained history, performing a medically appropriate examination and/or evaluation , counseling and educating the patient/family/caregiver, ordering medications, tests, or procedures, referring and communicating with other health care professionals  and documenting information in the medical record    Follow Up     Return in about 6 months (around 2/25/2023).    Patient was given instructions and counseling regarding his condition or for health maintenance advice. Please see specific information pulled into the AVS if appropriate.     Jai Dow  reports that he has never smoked. He has never used smokeless tobacco..

## 2022-08-30 ENCOUNTER — TELEPHONE (OUTPATIENT)
Dept: NEUROLOGY | Facility: CLINIC | Age: 57
End: 2022-08-30

## 2022-08-30 NOTE — TELEPHONE ENCOUNTER
After looking in patient's referral, it looks like I have been trying to reach patient due to a urgent referral. Was able to contact patient and move appointment to 09-01

## 2022-08-30 NOTE — TELEPHONE ENCOUNTER
Provider: MARIZA  Caller: PATIENT  Relationship to Patient: SELF  Pharmacy: N/A  Phone Number: [phone]  Reason for Call: PATIENT TELEPHONED RE: EARLIER APPT TIME. PATIENT HAS F/U APPT WITH DR BENSON 11/17/22 @ 3:45 PM PATIENT WOULD LIKE TO BE SEEN SOONER AS HE WAS RECENTLY SEEN & TREATED IN THE ER. DISCHARGED 8/12/22 @ [facility] ER FOR ANXIETY & MUSCLE CRAMPS; PANIC ATTACK    HE STATES HE WAS UNCONSCIOUS ON ER VISIT & HAS BEEN EXPERIENCING HEADACHES, DIZZINESS, NAUSEA, & FEELINGS OF PINS & NEEDLES IN HIS LEGS.    PLEASE CALL & ADVISE    THANK YOU

## 2022-09-01 ENCOUNTER — OFFICE VISIT (OUTPATIENT)
Dept: NEUROLOGY | Facility: CLINIC | Age: 57
End: 2022-09-01

## 2022-09-01 VITALS
HEIGHT: 71 IN | DIASTOLIC BLOOD PRESSURE: 91 MMHG | SYSTOLIC BLOOD PRESSURE: 151 MMHG | BODY MASS INDEX: 29.68 KG/M2 | WEIGHT: 212 LBS | HEART RATE: 61 BPM

## 2022-09-01 DIAGNOSIS — R90.82 WHITE MATTER ABNORMALITY ON MRI OF BRAIN: ICD-10-CM

## 2022-09-01 DIAGNOSIS — R41.3 MEMORY LOSS: ICD-10-CM

## 2022-09-01 DIAGNOSIS — G43.709 CHRONIC MIGRAINE WITHOUT AURA WITHOUT STATUS MIGRAINOSUS, NOT INTRACTABLE: ICD-10-CM

## 2022-09-01 DIAGNOSIS — F43.10 PTSD (POST-TRAUMATIC STRESS DISORDER): ICD-10-CM

## 2022-09-01 DIAGNOSIS — G44.86 CERVICOGENIC HEADACHE: ICD-10-CM

## 2022-09-01 DIAGNOSIS — F33.1 MODERATE RECURRENT MAJOR DEPRESSION: Primary | ICD-10-CM

## 2022-09-01 DIAGNOSIS — G47.33 OBSTRUCTIVE SLEEP APNEA SYNDROME: ICD-10-CM

## 2022-09-01 PROCEDURE — 96372 THER/PROPH/DIAG INJ SC/IM: CPT | Performed by: PSYCHIATRY & NEUROLOGY

## 2022-09-01 PROCEDURE — 99205 OFFICE O/P NEW HI 60 MIN: CPT | Performed by: PSYCHIATRY & NEUROLOGY

## 2022-09-01 RX ORDER — NAPROXEN SODIUM 275 MG/1
275 TABLET ORAL 2 TIMES DAILY WITH MEALS
COMMUNITY

## 2022-09-01 RX ORDER — ASPIRIN 81 MG/1
81 TABLET ORAL DAILY
COMMUNITY

## 2022-09-01 RX ORDER — ATENOLOL 50 MG/1
50 TABLET ORAL DAILY
COMMUNITY

## 2022-09-01 NOTE — ASSESSMENT & PLAN NOTE
I will refer him to psychiatry.  I discussed with him that he needs to keep up with his counselor and follow-up with present treatment plans that she has stated for him.

## 2022-09-01 NOTE — PROGRESS NOTES
Chief Complaint  Syncope     Subjective          Jai Dow is a 57 y.o. male who presents to [facility]ERY  History of Present Illness  57-year-old man evaluated for complaints of constant fatigue, migraines, tension headaches, numbness, tingling (pins-and-needles) in both of his lower legs and feet, muscle spasms, stiffness, sharp pain in the back right side of his head (throbbing), and neck, nausea, dizziness, sensitivity to lights, abnormal itching in both legs and arms (electrical sensations), problems maintaining concentration, focus, ringing in the ears, insomnia and restless sleeping due to nightmares (golf water, [place], [place]), not knowing where I am at upon waking up.  He states that he is worried that he was told he had multiple sclerosis.  His MRI of the brain showed multiple subcortical white matter changes, some of which go along the fiber tracts and are perpendicular to the corpus callosum.  This changes are consistent with demyelinating conditions such as multiple sclerosis.  He was seeing a neurologist before in 2018.  He states that his never had any focal neurologic symptoms in his 30s, 40s or 50s that he had slurring of speech, weakness in 1 arm and leg, problems with gait, incoordination, blindness.  He has never had strokelike symptoms.    He states that in August 2022 he just finished getting his haircut in [facility] and [facility] home he started to feel motion sickness and dizziness while driving.  She continued to find someone to pull over he became disoriented and did not know what directions was to get home.  He noticed a [place] head and pulled into the parking lot as a safe place.  The [place] was locked.  He started to panic, breaking out in cold sweats with an increased heart rate.  He called 911 and told the dispatcher that he was a  who did not know his way home.  2 officers in a separate [employer] cars arrived.  Officer asked him  "if he knew his wife's phone number and he said no.  He asked him if he had a phone and he said with him until he calmed down.  When he got home he slept most of the day.  On August 12, 2022 he woke up in the spare bedroom after a nightmare that he does not recall.  He had severe muscle cramps/spasms in his right leg at 1:00 AM.  He has walked in the hallway called his wife.  As she approached him and asked \"strong he said that he had significant muscle cramping in his right leg.  He started to lose his balance and legs gave way and he collapsed falling over the floor.  He remembers walking to see a paramedics standing over him.    He states that he is seeing a counselor in [facility] and he was recommended to go to [place] to go to a treatment facility.  He states that he has been having suicidal ideations and is more depressed in the last 6 months.    He states that his had bilateral knee replacements and he has persistent zingers and stiffness in both knees.  He also has lower back mild to moderate degenerative arthritis, mild disc bulge with several right and mild left facet hypertrophy.  He has chronic lower back pain that radiates into his legs and hips.    He has hypertension.  He works in [employer] in [employer].  States that he has significant problems with concentration.    He states that he had sleep apnea syndrome in the past and he did not wear his CPAP machine because it was suffocating him.  He has not seen a sleep physician since that time.  He has excessive daytime somnolence.  He has problems maintaining sleep.  He feels tired and exhausted all the time when he wakes up.      Has had migraine headaches since over 10 years ago.  They get worse at least twice a month lasting for 2 days.  It is usually left-sided throbbing associated light no sensitivity, nausea.  He states that he has several headaches in between that time as well but not as as severe.  Objective   Vital Signs:   /91 (BP " "Location: Right arm, Patient Position: Sitting, Cuff Size: Adult)   Pulse 61   Ht 180.3 cm (70.98\")   Wt 96.2 kg (212 lb)   BMI 29.58 kg/m²     Physical Exam   He is alert, fluent, phasic, follows commands well.  He looks depressed and has poor eye contact.  He has low voice and does not quickly.  He was able to give me the history as noted above even though he wrote this as well which I read for to be copied.  He had the same story as it wrote above.  Optic disc are normal bilaterally visual fields full confrontation, EOMs full all directions gaze, facial strength is full, soft palate elevation and tongue are normal.  There is no weakness of the upper or lower extremities and with muscle testing.  There is giveaway weakness on dorsiflexion.  Reflexes are normoactive and symmetrical in the biceps, triceps, patellar's and ankles.  Sensation symmetrical to pinprick.  Cerebellar testing is intact.  Station gait is able to tiptoe, heel walk, soumya without any weakness.  Heart is regular and rhythm normal in rate.  Pain in his right occiput to palpation.        Assessment and Plan  Diagnoses and all orders for this visit:    1. Moderate recurrent major depression (HCC) (Primary)  Assessment & Plan:  I will refer him to psychiatry.  I discussed with him that he needs to keep up with his counselor and follow-up with present treatment plans that she has stated for him.    Orders:  -     Ambulatory Referral to Psychiatry    2. Memory loss  Assessment & Plan:  Discussed with him that the memory loss is combination of problems from his sleep deprivation, PTSD, depression, migraines.    Orders:  -     MRI Brain Without Contrast; Future  -     Ambulatory Referral to Psychiatry    3. Chronic migraine without aura without status migrainosus, not intractable  Assessment & Plan:  I will give an injection of Ajovy.  He was given a sample in our office.  I explained to him that respects of the medication as well as rationale for " giving this to him.  He is to self inject himself and I will see him again in 7 to 8 weeks time for follow-up.  He is presently on amitriptyline as well as atenolol which is not helping his headaches.        Orders:  -     MRI Brain Without Contrast; Future    4. PTSD (post-traumatic stress disorder)  Assessment & Plan:  I will refer him to psychiatry.    Orders:  -     Ambulatory Referral to Psychiatry    5. Cervicogenic headache  Assessment & Plan:  I discussed with him regarding occipital nerve blocks.  I will refer him to pain management.    Orders:  -     Ambulatory Referral to Pain Management    6. Obstructive sleep apnea syndrome  -     Ambulatory Referral to Sleep Medicine    7. White matter abnormality on MRI of brain  Assessment & Plan:  Discussed with him that I cannot see the previous MRI that was performed however it is very unlikely that he has multiple sclerosis.  I will repeat an MRI of the brain and findings that are noted are most likely vessel disease from his hypertension.         Total time spent with the patient and coordinating patient care was 65 minutes.    Follow Up  No follow-ups on file.  Patient was given instructions and counseling regarding his condition or for health maintenance advice. Please see specific information pulled into the AVS if appropriate.

## 2022-09-01 NOTE — ASSESSMENT & PLAN NOTE
Discussed with him that I cannot see the previous MRI that was performed however it is very unlikely that he has multiple sclerosis.  I will repeat an MRI of the brain and findings that are noted are most likely vessel disease from his hypertension.

## 2022-09-01 NOTE — ASSESSMENT & PLAN NOTE
Discussed with him that the memory loss is combination of problems from his sleep deprivation, PTSD, depression, migraines.

## 2022-09-01 NOTE — ASSESSMENT & PLAN NOTE
I will give an injection of Ajovy.  He was given a sample in our office.  I explained to him that respects of the medication as well as rationale for giving this to him.  He is to self inject himself and I will see him again in 7 to 8 weeks time for follow-up.  He is presently on amitriptyline as well as atenolol which is not helping his headaches.

## 2022-09-02 ENCOUNTER — TELEPHONE (OUTPATIENT)
Dept: NEUROLOGY | Facility: CLINIC | Age: 57
End: 2022-09-02

## 2022-09-02 RX ORDER — FREMANEZUMAB-VFRM 225 MG/1.5ML
225 INJECTION SUBCUTANEOUS
Qty: 1.68 ML | Refills: 3 | Status: CANCELLED | OUTPATIENT
Start: 2022-09-02

## 2022-09-02 RX ORDER — FREMANEZUMAB-VFRM 225 MG/1.5ML
225 INJECTION SUBCUTANEOUS
Qty: 1.68 ML | Refills: 6 | Status: SHIPPED | OUTPATIENT
Start: 2022-09-02 | End: 2022-09-22 | Stop reason: SDUPTHER

## 2022-09-02 NOTE — ADDENDUM NOTE
Addended by: ANGI LEACH on: 9/2/2022 08:58 AM     Modules accepted: Orders    
Addended by: ANGI LEACH on: 9/2/2022 09:01 AM     Modules accepted: Orders    
I will STOP taking the medications listed below when I get home from the hospital:  None

## 2022-09-02 NOTE — TELEPHONE ENCOUNTER
Medications pending for your approval, would [facility]     Approvedtoday  CaseId:[id number];Status:Approved;Review Type:Prior Auth;Coverage Start Date:08/03/2022;Coverage End Date:03/01/2023;

## 2022-09-02 NOTE — TELEPHONE ENCOUNTER
I prescribed it to [facility]   [Post Op: _________] : a [unfilled] post op visit [FreeTextEntry1] : The patient comes in with a strong family history of breast cancer and ovarian cancer and a personal history of undergoing a right breast 12:00 ultrasound-guided core biopsy showing atypical duct hyperplasia and then a wide excision in March 2019 just showing a radial sclerosing lesion with no further atypia.  She has a Caren model risk of 15.7%.  She was then found to have an abnormal mammography and ultrasound  in October 2021 with a suspicious approximately 2 cm left breast lower inner quadrant density which was biopsied on November 5, 2021 showing intermediate grade invasive duct cancer which was ER/IN strongly positive and HER-2/merrill negative with a Ki-67 between 40 and 50%.  She underwent an MRI November 2021 and was found to have a suspicious area in the lower inner aspect of the right breast as well as another area of enhancement in the central left breast and MRI core biopsy showed a right breast lower inner quadrant density to be an intermediate grade invasive lobular cancer which was ER/IN positive HER-2/merrill negative with a Ki-67 of 25%.  The central left breast biopsy showed a sclerosed papillary lesion.  Genetic testing was negative.  She underwent bilateral partial mastectomies with bilateral mastopexies and tissue rearrangement by Dr. Patricio with bilateral sentinel lymph node biopsies in January 2022.  Final pathology showed 4 separate satellite lesions in the left breast which was a ductal cancer with the largest focus measuring 2.7 cm but she had a positive inferior margin and had 3 sentinel nodes 2 with macrometastasis with extranodal extension and the third sentinel node with a micrometastasis and 1 negative nonsentinel lymph node making this a pathologic prognostic stage IB breast cancer.  The right breast partial mastectomy showed a 5 cm classical invasive lobular cancer with 2 sentinel nodes 1 with some isolated tumor cells and she also had a positive inferior margin with lobular cancer making this a pathologic prognostic stage IA breast cancer.  She had bilateral positive inferior margins and macrometastasis in the left axillary sentinel lymph nodes.  She underwent Oncotype recurrence scores by Dr. Jones and the right lobular cancer had a recurrence score of 10 and the left node positive ductal cancer had a recurrence score of 17.  She was entered into the FLEX trial and MammaPrint showed a low risk luminal A subtype.  It was decided that she undergo bilateral mastectomies for the positive margins and a left axillary lymph node dissection which was performed on April 11, 2022 prior to any chemotherapy decision.  The final pathology showed no further cancer in either mastectomy specimen and she had 9 negative left axillary lymph nodes however a separate lower inner quadrant anterior margin on the right side showed a 5 mm focus of invasive lobular cancer which focally extended to the anterior margin.  She comes in now for postop follow-up

## 2022-09-08 ENCOUNTER — OFFICE VISIT (OUTPATIENT)
Dept: ORTHOPEDIC SURGERY | Facility: CLINIC | Age: 57
End: 2022-09-08

## 2022-09-08 VITALS — BODY MASS INDEX: 30.35 KG/M2 | WEIGHT: 212 LBS | HEIGHT: 70 IN | OXYGEN SATURATION: 100 % | HEART RATE: 63 BPM

## 2022-09-08 DIAGNOSIS — M25.512 LEFT SHOULDER PAIN, UNSPECIFIED CHRONICITY: Primary | ICD-10-CM

## 2022-09-08 DIAGNOSIS — M75.82 TENDINITIS OF LEFT ROTATOR CUFF: ICD-10-CM

## 2022-09-08 PROCEDURE — 20610 DRAIN/INJ JOINT/BURSA W/O US: CPT | Performed by: ORTHOPAEDIC SURGERY

## 2022-09-08 PROCEDURE — 99203 OFFICE O/P NEW LOW 30 MIN: CPT | Performed by: ORTHOPAEDIC SURGERY

## 2022-09-08 RX ORDER — TRIAMCINOLONE ACETONIDE 40 MG/ML
40 INJECTION, SUSPENSION INTRA-ARTICULAR; INTRAMUSCULAR
Status: COMPLETED | OUTPATIENT
Start: 2022-09-08 | End: 2022-09-08

## 2022-09-08 RX ORDER — LIDOCAINE HYDROCHLORIDE 10 MG/ML
5 INJECTION, SOLUTION INFILTRATION; PERINEURAL
Status: COMPLETED | OUTPATIENT
Start: 2022-09-08 | End: 2022-09-08

## 2022-09-08 RX ADMIN — TRIAMCINOLONE ACETONIDE 40 MG: 40 INJECTION, SUSPENSION INTRA-ARTICULAR; INTRAMUSCULAR at 09:52

## 2022-09-08 RX ADMIN — LIDOCAINE HYDROCHLORIDE 5 ML: 10 INJECTION, SOLUTION INFILTRATION; PERINEURAL at 09:52

## 2022-09-08 NOTE — PROGRESS NOTES
"Chief Complaint  Initial Evaluation of the Left Shoulder     Subjective      Jai Dow presents to [facility] for an evaluation of left shoulder. He has been having shoulder pain for a year. Both shoulders wake him up and they're sore but the left shoulder is worse. He states his left shoulder is popping, sometimes it feels like it is popping out of socket. He has injured the shoulder in the past. He doesn't recall the exact injuries.     Allergies   Allergen Reactions   • Amoxil [Amoxicillin] Angioedema        Social History     Socioeconomic History   • Marital status:    Tobacco Use   • Smoking status: Former Smoker     Packs/day: 0.50     Years: 10.00     Pack years: 5.00     Types: Cigarettes, Electronic Cigarette     Start date: 3/31/2002     Quit date: 6/10/2017     Years since quittin.2   • Smokeless tobacco: Never Used   Substance and Sexual Activity   • Alcohol use: Yes     Alcohol/week: 9.0 standard drinks     Types: 2 Cans of beer, 7 Shots of liquor per week     Comment: Drank everyday after the [place]   • Drug use: Never   • Sexual activity: Yes     Partners: Female        Review of Systems     Objective   Vital Signs:   Pulse 63   Ht 177.8 cm (70\")   Wt 96.2 kg (212 lb)   SpO2 100%   BMI 30.42 kg/m²       Physical Exam  Constitutional:       Appearance: Normal appearance. Patient is well-developed and normal weight.   HENT:      Head: Normocephalic.      Right Ear: Hearing and external ear normal.      Left Ear: Hearing and external ear normal.      Nose: Nose normal.   Eyes:      Conjunctiva/sclera: Conjunctivae normal.   Cardiovascular:      Rate and Rhythm: Normal rate.   Pulmonary:      Effort: Pulmonary effort is normal.      Breath sounds: No wheezing or rales.   Abdominal:      Palpations: Abdomen is soft.      Tenderness: There is no abdominal tenderness.   Musculoskeletal:      Cervical back: Normal range of motion.   Skin:     Findings: No " rash.   Neurological:      Mental Status: Patient is alert and oriented to person, place, and time.   Psychiatric:         Mood and Affect: Mood and affect normal.         Judgment: Judgment normal.       Ortho Exam      LEFT SHOULDER: Er to 60 degrees with soreness pains. Positive impingement signs. Sensation grossly intact. Neurovascular intact.  Good tone of deltoid, biceps, triceps, wrist extensors, and wrist flexors.  Full forward elevation. Abduction to 100 degrees.       Large Joint Arthrocentesis  Date/Time: 9/8/2022 9:52 AM  Consent given by: patient  Site marked: site marked  Timeout: Immediately prior to procedure a time out was called to verify the correct patient, procedure, equipment, support staff and site/side marked as required   Supporting Documentation  Indications: pain   Procedure Details  Location: shoulder (LEFT SHOULDER) -   Needle gauge: 21G.  Medications administered: 40 mg triamcinolone acetonide 40 MG/ML; 5 mL lidocaine 1 %  Patient tolerance: patient tolerated the procedure well with no immediate complications            Imaging Results (Most Recent)     Procedure Component Value Units Date/Time    XR Scapula Left [[id number]] Resulted: 09/08/22 0933     Updated: 09/08/22 0936           Result Review :     X-Ray Report:  Left scapula X-Ray  Indication: Evaluation of left shoulder pain   AP and Lateral view(s)  Findings: There are no acute fractures or dislocation noted.   Prior studies available for comparison: no             Assessment and Plan     Diagnoses and all orders for this visit:    1. Left shoulder pain, unspecified chronicity (Primary)  -     XR Scapula Left    2. Tendinitis of left rotator cuff         Plan for left shoulder injection and home exercises program. Left shoulder injection administered and patient tolerated this well. If failure to improve an Mri is the next step.     Call or return if worsening symptoms.    Follow Up     4 weeks       Patient was given  instructions and counseling regarding his condition or for health maintenance advice. Please see specific information pulled into the AVS if appropriate.     Scribed for Peace Wilkins MD by Radha De Leon.  09/08/22   09:39 EDT    I have personally performed the services described in this document as scribed by the above individual and it is both accurate and complete. Peace Wilkins MD 09/08/22

## 2022-09-14 ENCOUNTER — HOSPITAL ENCOUNTER (OUTPATIENT)
Dept: MRI IMAGING | Facility: HOSPITAL | Age: 57
Discharge: HOME | End: 2022-09-14
Admitting: PSYCHIATRY & NEUROLOGY

## 2022-09-14 DIAGNOSIS — G43.709 CHRONIC MIGRAINE WITHOUT AURA WITHOUT STATUS MIGRAINOSUS, NOT INTRACTABLE: ICD-10-CM

## 2022-09-14 DIAGNOSIS — R41.3 MEMORY LOSS: ICD-10-CM

## 2022-09-14 PROCEDURE — 70551 MRI BRAIN STEM W/O DYE: CPT

## 2022-09-22 ENCOUNTER — TELEPHONE (OUTPATIENT)
Dept: NEUROLOGY | Facility: CLINIC | Age: 57
End: 2022-09-22

## 2022-09-22 RX ORDER — FREMANEZUMAB-VFRM 225 MG/1.5ML
225 INJECTION SUBCUTANEOUS
Qty: 1.68 ML | Refills: 6 | Status: CANCELLED | OUTPATIENT
Start: 2022-09-22

## 2022-09-22 RX ORDER — FREMANEZUMAB-VFRM 225 MG/1.5ML
1 INJECTION SUBCUTANEOUS
Qty: 4.5 ML | Refills: 1 | Status: SHIPPED | OUTPATIENT
Start: 2022-09-22 | End: 2023-01-12 | Stop reason: SDUPTHER

## 2022-09-22 NOTE — TELEPHONE ENCOUNTER
Caller: HAM DAN    Relationship: Emergency Contact    Best call back number: [phone]    Requested Prescriptions:   Requested Prescriptions     Pending Prescriptions Disp Refills   • Fremanezumab-vfrm (Ajovy) 225 MG/1.5ML solution auto-injector 1.68 mL 6     Sig: Inject 225 mg under the skin into the appropriate area as directed Every 30 (Thirty) Days.        Pharmacy where request should be sent:  [facility]     Additional details provided by patient:PATIENTS WIFE TELEPHONED RE:CLARIFYING OR ADJUSTING RX FOR FREMANEZUMAB-VFRM  (AJOVY) 225 MG/1.5MO SOLUTION AUTO INJECTOR TO BE APPROVED FOR MORE THAN 1 REFILL.    Does the patient have less than a 3 day supply:  [] Yes  [x] No    Evelia Junior Rep   09/22/22 10:34 EDT

## 2022-10-25 ENCOUNTER — OFFICE VISIT (OUTPATIENT)
Dept: ORTHOPEDIC SURGERY | Facility: CLINIC | Age: 57
End: 2022-10-25

## 2022-10-25 VITALS — WEIGHT: 208 LBS | HEIGHT: 70 IN | BODY MASS INDEX: 29.78 KG/M2

## 2022-10-25 DIAGNOSIS — M75.82 TENDINITIS OF LEFT ROTATOR CUFF: Primary | ICD-10-CM

## 2022-10-25 DIAGNOSIS — M25.512 LEFT SHOULDER PAIN, UNSPECIFIED CHRONICITY: ICD-10-CM

## 2022-10-25 PROCEDURE — 99213 OFFICE O/P EST LOW 20 MIN: CPT | Performed by: PHYSICIAN ASSISTANT

## 2022-10-25 RX ORDER — HYDROCODONE BITARTRATE AND ACETAMINOPHEN 5; 325 MG/1; MG/1
TABLET ORAL
COMMUNITY
End: 2022-10-26

## 2022-10-25 NOTE — PROGRESS NOTES
"Chief Complaint  Follow-up of the Left Shoulder    Subjective      Jai Dow presents to [facility] for follow-up of left shoulder pain and feeling of instability.  He was seen in office on 9/8/2022 and diagnosed with left shoulder rotator cuff tendinitis and received a left shoulder steroid injection.  He presents today for follow-up reporting he has noted some improvement in the pain he had experienced, however, reports at least 4 occasions where he feels as if his shoulder \"popped out\".  He reports that he was able to manually put his shoulder back into place.  He states these instances have occurred with normal ADLs, including hanging shirt, reaching for water on a shelf, or picking up a bottle of water.  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    Objective   Allergies   Allergen Reactions   • Amoxil [Amoxicillin] Angioedema       Vital Signs:   Ht 177.8 cm (70\")   Wt 94.3 kg (208 lb)   BMI 29.84 kg/m²       Physical Exam    Constitutional: Awake, alert. Well nourished appearance.    Integumentary: Warm, dry, intact. No obvious rashes.    HENT: Atraumatic, normocephalic.   Respiratory: Non labored respirations .   Cardiovascular: Intact peripheral pulses.    Psychiatric: Normal mood and affect. A&O X3    Ortho Exam  Left shoulder: Full active forward flexion.  Abduction to 145 degrees.  External rotation to 30 degrees.  Internal rotation to L2 with reported feeling of instability.  Full flexion extension of the elbow and wrist.  Sensation intact to light touch.  Distal neurovascular intact.    Imaging Results (Most Recent)     None            Assessment and Plan   Problem List Items Addressed This Visit    None  Visit Diagnoses     Tendinitis of left rotator cuff    -  Primary    Relevant Orders    MRI Shoulder Left Without Contrast    " Left shoulder pain, unspecified chronicity            Follow Up   No follow-ups on file.    Patient Instructions   Patient with improvement in pain since steroid injection. He still has signifcant instability. Will proceed with L shoulder MRI. Continue home exercises.     Follow up MRI results. Can consider R shoulder steroid injection at this visit. Call with changes or concerns.     Patient was given instructions and counseling regarding his condition or for health maintenance advice. Please see specific information pulled into the AVS if appropriate.

## 2022-10-25 NOTE — PATIENT INSTRUCTIONS
Patient with improvement in pain since steroid injection. He still has signifcant instability. Will proceed with L shoulder MRI. Continue home exercises.     Follow up MRI results. Can consider R shoulder steroid injection at this visit. Call with changes or concerns.

## 2022-10-26 ENCOUNTER — OFFICE VISIT (OUTPATIENT)
Dept: NEUROLOGY | Facility: CLINIC | Age: 57
End: 2022-10-26

## 2022-10-26 VITALS
WEIGHT: 209 LBS | SYSTOLIC BLOOD PRESSURE: 130 MMHG | HEART RATE: 81 BPM | HEIGHT: 70 IN | DIASTOLIC BLOOD PRESSURE: 88 MMHG | BODY MASS INDEX: 29.92 KG/M2

## 2022-10-26 DIAGNOSIS — F33.1 MODERATE RECURRENT MAJOR DEPRESSION: ICD-10-CM

## 2022-10-26 DIAGNOSIS — R20.2 NUMBNESS AND TINGLING: ICD-10-CM

## 2022-10-26 DIAGNOSIS — R20.0 NUMBNESS AND TINGLING: ICD-10-CM

## 2022-10-26 DIAGNOSIS — R90.82 WHITE MATTER ABNORMALITY ON MRI OF BRAIN: Primary | ICD-10-CM

## 2022-10-26 DIAGNOSIS — R41.3 MEMORY LOSS: ICD-10-CM

## 2022-10-26 PROCEDURE — 99213 OFFICE O/P EST LOW 20 MIN: CPT | Performed by: PSYCHIATRY & NEUROLOGY

## 2022-10-26 NOTE — ASSESSMENT & PLAN NOTE
I would recommend for him to be referred to [facility] neurology clinic since the patient and his wife walked out before the visit was completed.

## 2022-10-26 NOTE — PROGRESS NOTES
"Chief Complaint  No chief complaint on file.    Subjective          Jai Dow is a 57 y.o. male who presents to [facility]GERY  History of Present Illness  57-year-old man here for follow-up of his MRI of the brain.  They are wondering why he has demyelinating disease written on the report.  I discussed with them that the MRI of the brain is unchanged from an MRI performed on August 2018 and he does not have demyelinating disease.  I discussed with them that this is what is written on the report and it is up to the neurologist to determine whether or not he has demyelinating disease or not.  I reviewed the images and he does not have demyelinating disease.  Patient then got upset and asked me why he has numbness in his arms and legs.  He states that he told me on his last clinic visit.  I had it documented that had numbness and tingling in his feet and legs (pins-and-needles).   He continues to have shooting pain in his head.  He did not get the occipital nerve blocks because his insurance would not approve it. I discussed with him and his wife that we can do further work-up including laboratory work-up and an MRI of the cervical spine.  He demanded his diagnosis and I told him that I do not know his diagnosis and that we can refer him to the [facility] neurology clinic.  Patient and his wife walked out and said that this was a waste of their time.    Objective   Vital Signs:   /88   Pulse 81   Ht 177.8 cm (70\")   Wt 94.8 kg (209 lb)   BMI 29.99 kg/m²     Physical Exam           Assessment and Plan  Diagnoses and all orders for this visit:    1. White matter abnormality on MRI of brain (Primary)  Assessment & Plan:  I would recommend for him to be referred to [facility] neurology clinic since the patient and his wife walked out before the visit was completed.      2. Moderate recurrent major depression (HCC)  -     Vitamin B12; Future  -     " Folate; Future  -     Methylmalonic Acid, Serum; Future  -     TSH; Future  -     MRI Cervical Spine Without Contrast; Future    3. Memory loss  -     Vitamin B12; Future  -     Folate; Future  -     Methylmalonic Acid, Serum; Future  -     TSH; Future  -     MRI Cervical Spine Without Contrast; Future    4. Numbness and tingling  Assessment & Plan:  I would recommend for laboratory work-up as well as MRI of the cervical spine to be performed by his primary care provider since patient and his wife walked out.    Orders:  -     MRI Cervical Spine Without Contrast; Future       Total time spent with the patient and coordinating patient care was 25 minutes.    Follow Up  No follow-ups on file.  Patient was given instructions and counseling regarding his condition or for health maintenance advice. Please see specific information pulled into the AVS if appropriate.

## 2022-10-26 NOTE — ASSESSMENT & PLAN NOTE
I would recommend for laboratory work-up as well as MRI of the cervical spine to be performed by his primary care provider since patient and his wife walked out.

## 2022-11-08 ENCOUNTER — TELEPHONE (OUTPATIENT)
Dept: FAMILY MEDICINE CLINIC | Facility: CLINIC | Age: 57
End: 2022-11-08

## 2022-11-08 NOTE — TELEPHONE ENCOUNTER
Caller: HAM DAN    Relationship: Emergency Contact    Best call back number: [phone]     What is the medical concern/diagnosis: RINGING IN EARS    What specialty or service is being requested: AUDIOLOGIST    What is the provider, practice or medical service name:     What is the office location:     What is the office phone number:     Any additional details: CALLER CANNOT REMEMBER NAME OF AUDIOLOGIST, BUT SHE SAYS HE IS ON [address]    PLEASE CALL AND ADVISE WHEN REFERRAL HAS BEEN SENT    ALSO WOULD LIKE A REFERRAL TO BE SENT FOR A DERMATOLOGIST ON [address] FOR RASH ON BOTH HANDS

## 2022-11-09 ENCOUNTER — TELEPHONE (OUTPATIENT)
Dept: FAMILY MEDICINE CLINIC | Facility: CLINIC | Age: 57
End: 2022-11-09

## 2022-11-15 ENCOUNTER — LAB (OUTPATIENT)
Dept: LAB | Facility: HOSPITAL | Age: 57
End: 2022-11-15

## 2022-11-15 ENCOUNTER — PATIENT ROUNDING (BHMG ONLY) (OUTPATIENT)
Dept: NEUROLOGY | Facility: CLINIC | Age: 57
End: 2022-11-15

## 2022-11-15 ENCOUNTER — TELEMEDICINE (OUTPATIENT)
Dept: FAMILY MEDICINE CLINIC | Facility: CLINIC | Age: 57
End: 2022-11-15

## 2022-11-15 ENCOUNTER — OFFICE VISIT (OUTPATIENT)
Dept: NEUROLOGY | Facility: CLINIC | Age: 57
End: 2022-11-15

## 2022-11-15 VITALS
HEART RATE: 63 BPM | WEIGHT: 224 LBS | SYSTOLIC BLOOD PRESSURE: 172 MMHG | OXYGEN SATURATION: 98 % | BODY MASS INDEX: 32.07 KG/M2 | HEIGHT: 70 IN | DIASTOLIC BLOOD PRESSURE: 92 MMHG

## 2022-11-15 DIAGNOSIS — H93.19 TINNITUS, UNSPECIFIED LATERALITY: Primary | ICD-10-CM

## 2022-11-15 DIAGNOSIS — G35 MULTIPLE SCLEROSIS: ICD-10-CM

## 2022-11-15 DIAGNOSIS — L30.9 DERMATITIS: ICD-10-CM

## 2022-11-15 DIAGNOSIS — R56.9 SEIZURE: ICD-10-CM

## 2022-11-15 DIAGNOSIS — G35 MULTIPLE SCLEROSIS: Primary | ICD-10-CM

## 2022-11-15 LAB
BASOPHILS # BLD AUTO: 0.06 10*3/MM3 (ref 0–0.2)
BASOPHILS NFR BLD AUTO: 1.1 % (ref 0–1.5)
DEPRECATED RDW RBC AUTO: 40.2 FL (ref 37–54)
EOSINOPHIL # BLD AUTO: 0.13 10*3/MM3 (ref 0–0.4)
EOSINOPHIL NFR BLD AUTO: 2.5 % (ref 0.3–6.2)
ERYTHROCYTE [DISTWIDTH] IN BLOOD BY AUTOMATED COUNT: 13.2 % (ref 12.3–15.4)
HCT VFR BLD AUTO: 41.4 % (ref 37.5–51)
HGB BLD-MCNC: 13.7 G/DL (ref 13–17.7)
IMM GRANULOCYTES # BLD AUTO: 0.01 10*3/MM3 (ref 0–0.05)
IMM GRANULOCYTES NFR BLD AUTO: 0.2 % (ref 0–0.5)
LYMPHOCYTES # BLD AUTO: 1.57 10*3/MM3 (ref 0.7–3.1)
LYMPHOCYTES NFR BLD AUTO: 29.6 % (ref 19.6–45.3)
MCH RBC QN AUTO: 27.2 PG (ref 26.6–33)
MCHC RBC AUTO-ENTMCNC: 33.1 G/DL (ref 31.5–35.7)
MCV RBC AUTO: 82.3 FL (ref 79–97)
MONOCYTES # BLD AUTO: 0.74 10*3/MM3 (ref 0.1–0.9)
MONOCYTES NFR BLD AUTO: 14 % (ref 5–12)
NEUTROPHILS NFR BLD AUTO: 2.79 10*3/MM3 (ref 1.7–7)
NEUTROPHILS NFR BLD AUTO: 52.6 % (ref 42.7–76)
PLATELET # BLD AUTO: 216 10*3/MM3 (ref 140–450)
PMV BLD AUTO: 10.3 FL (ref 6–12)
RBC # BLD AUTO: 5.03 10*6/MM3 (ref 4.14–5.8)
WBC NRBC COR # BLD: 5.3 10*3/MM3 (ref 3.4–10.8)

## 2022-11-15 PROCEDURE — 86235 NUCLEAR ANTIGEN ANTIBODY: CPT

## 2022-11-15 PROCEDURE — 86051 AQUAPORIN-4 ANTB ELISA: CPT

## 2022-11-15 PROCEDURE — 85025 COMPLETE CBC W/AUTO DIFF WBC: CPT

## 2022-11-15 PROCEDURE — 86038 ANTINUCLEAR ANTIBODIES: CPT

## 2022-11-15 PROCEDURE — 86362 MOG-IGG1 ANTB CBA EACH: CPT

## 2022-11-15 PROCEDURE — 85652 RBC SED RATE AUTOMATED: CPT

## 2022-11-15 PROCEDURE — 80053 COMPREHEN METABOLIC PANEL: CPT

## 2022-11-15 PROCEDURE — 36415 COLL VENOUS BLD VENIPUNCTURE: CPT

## 2022-11-15 PROCEDURE — 99213 OFFICE O/P EST LOW 20 MIN: CPT | Performed by: NURSE PRACTITIONER

## 2022-11-15 PROCEDURE — 99205 OFFICE O/P NEW HI 60 MIN: CPT | Performed by: PSYCHIATRY & NEUROLOGY

## 2022-11-15 PROCEDURE — 86225 DNA ANTIBODY NATIVE: CPT

## 2022-11-15 PROCEDURE — 86140 C-REACTIVE PROTEIN: CPT

## 2022-11-15 RX ORDER — RIMEGEPANT SULFATE 75 MG/75MG
75 TABLET, ORALLY DISINTEGRATING ORAL TAKE AS DIRECTED
Qty: 6 TABLET | Refills: 0 | COMMUNITY
Start: 2022-11-15

## 2022-11-15 RX ORDER — CHLORAL HYDRATE 500 MG
1000 CAPSULE ORAL
COMMUNITY

## 2022-11-15 RX ORDER — PREGABALIN 75 MG/1
75 CAPSULE ORAL 2 TIMES DAILY
Qty: 60 CAPSULE | Refills: 2 | Status: SHIPPED | OUTPATIENT
Start: 2022-11-15 | End: 2022-12-22 | Stop reason: SDUPTHER

## 2022-11-15 RX ORDER — ATORVASTATIN CALCIUM 10 MG/1
10 TABLET, FILM COATED ORAL DAILY
COMMUNITY

## 2022-11-15 NOTE — PROGRESS NOTES
This consult is at the request of SARATH Suresh.  Thank you for involving me in the care of this patient.        As you well know this is a very pleasant 57-year-old gentleman who presents today with multiple neurologic complaints.  The first of these complaints was that he developed burning and tingling in both of his hands which started approximately 3 years ago.  He had an episode of burning and tingling before this in his hands which led to an MRI of the brain which showed lesions concerning for possible multiple sclerosis in 2018.  He had follow-up imaging in September that was also concerning for possible multiple sclerosis.  He has never been on treatment for multiple sclerosis.  He states that when he had this burning and tingling it did improve but has been much worse over the last couple of months.  He also reports increasing fatigue and general sense of being unwell.  He has occasional electrical shocklike sensations in his neck this does not always occur with neck flexion but has had several of these over the last couple of months.        Both he and his wife report on 2 separate occasions he had brief neurologic events.  He had 1 when waking up where his wife states he was very confused and he appeared to stumble to the left.  He then appeared to have a brief episode of jerking or shaking followed by confusion which lasted for approximately 30 to 45 minutes before returning to baseline.  He was taken to the emergency room but this was thought to be due to his underlying PTSD.  He had another event where he got lost he had been at [employer] he does not recall this event very well but he had been at [employer] and he was pulled over by the police the police called his wife and states that he was confused.  When she went to see him he was alert but could not tell her how or how to get back home and seemed very somnolent.  This lasted for about 30 to 45 minutes before he returned to his baseline.        He  also reports that over the last couple years has had severe headache.  His headaches are throbbing mostly located around his temples.  He had a long history of migraine headaches after hitting his head when he was in the [employer].  He develops burning and throbbing pain around both of his temples this is often associated with severe light sensitivity noise sensitivity and occasional nausea.  He has been on Ajovy and he states that when he is taking the Ajovy injections this does help significantly with his headaches they have decreased in frequency although when he does have headaches they are rarely typically intense although brief.        Past medical history    PTSD    Previous knee replacements    Migraine    Hypertension    Depression        Family history    His mother had dementia and had multiple strokes both his father and brother had strokes no history of MS in the family or other neuro immune conditions        Social history    He has a history of alcohol abuse but has not used alcohol in years.  No alcohol tobacco or illicit drug use reported currently.    No high risk sexual behaviors        Review of systems    Negative besides history of present illness        On examination today is alert and oriented x3.  Speech is fluent there is no dysarthria no aphasia.  The content of his speech is appropriate and goal-directed.  Cranial nerves II through XII are intact there is no PEPE and no APD.  He has 5 out of 5 strength in bilateral upper and lower extremities with normal tone and bulk.  His reflexes are brisk throughout with cross adduction he is got several beats of clonus bilaterally this is worse in the right than the left he has bilateral Mable's there is no ataxia finger-nose or heel-to-shin.  Rapid alternating movements including hand fisting and finger tapping showed normal speed and amplitude.  Sensations intact throughout there is no sensory level he can walk 25 feet and 5 seconds he had trouble  with tandem gait and he has trouble turning.        I reviewed 2 separate MRIs one from 2018 and one from September of this year.  Both showed scattered subcortical white matter lesions he has a couple of periventricular lesions there is a sagittal FLAIR from 2018 which shows lesions highly concerning for multiple sclerosis.        In his history examination and imaging I think this is consistent with multiple sclerosis.  He has definitely had problems at least since 2018.  Given his age and that he is -American I am going to order some additional laboratory studies I am going to obtain a repeat MRI of the brain with sagittal FLAIR as well as imaging of the cervical and thoracic spinal cord.  I may consider a lumbar puncture but I like to see the imaging and laboratory studies first.  Given his brief spell like events I am concerned these are likely seizures I am going to obtain a routine EEG as well as CTA of the head and neck.  I did instruct him he should not be driving currently.        For ongoing migraines he can continue Ajovy I gave him Nurtec for abortive therapy.  For neuropathic pain I am going to start him on Lyrica 75 mg p.o. twice daily I reviewed the risks and benefits of this medication with him at length.  He will follow-up with me in a couple weeks to review imaging or sooner if there is any problems in the interim.  At this time we will likely discuss disease modifying therapy depending on what his blood work and imaging shows.      Diagnoses and all orders for this visit:    1. Multiple sclerosis (HCC) (Primary)  -     Comprehensive Metabolic Panel; Future  -     CBC & Differential; Future  -     Neuromyelitis Optica (NMO) Auto Antibody, IgG; Future  -     Anti-Myelin Oligodendrocyte Glycoprotein (MOG), Serum; Future  -     Sedimentation Rate; Future  -     C-reactive Protein; Future  -     EN by IFA, Reflex 9-biomarkers profile; Future  -     MRI Brain With & Without Contrast; Future  -      MRI Cervical Spine With & Without Contrast; Future  -     MRI Thoracic Spine With & Without Contrast; Future  -     CT Angiogram Head; Future  -     CT Angiogram Neck With & Without Contrast; Future  -     EEG (Hospital Performed); Future  -     pregabalin (Lyrica) 75 MG capsule; Take 1 capsule by mouth 2 (Two) Times a Day.  Dispense: 60 capsule; Refill: 2  -     Rimegepant Sulfate (Nurtec) 75 MG tablet dispersible tablet; Take 1 tablet by mouth Take As Directed for 6 doses.  Dispense: 6 tablet; Refill: 0    2. Seizure (HCC)  -     CT Angiogram Head; Future  -     CT Angiogram Neck With & Without Contrast; Future  -     EEG (Hospital Performed); Future      I spent 1 hour in patient care.

## 2022-11-15 NOTE — PROGRESS NOTES
[facility]  Location of pt:  Home  Location of Provider:  [facility]      Chief Complaint  Tinnitus and Dermatitis     SUBJECTIVE  Jai Dow presents to [facility]     History of Present Illness  PT requesting a referral for Dermatology and ENT , Tinnitus (Patient states he has had ringing in the both ears off and on for 6 months, Ringing is getting consistent and patient is having trouble sleeping ), and Rash (Patient states new rash started on the legs in June or July and its not going away.  Skin is very dry     Past Medical History:   Diagnosis Date   • Cluster headache 2003    [facility]   • Difficulty walking 2018    Knee replacements   • Head injury 2001    In the [employer] in the back of a track vehicle   • Headache, tension-type 2003    [facility]   • Hypertension 2006   • Memory loss 2001    Increasing, memory loss Family members names, co-workers, recent projects   • Migraine    • PTSD (post-traumatic stress disorder)    • Sleep apnea     Stop using the machine/felt like I couldn’t breath  during nightmares   • Vision loss 2022    Blurred      Family History   Problem Relation Age of Onset   • Dementia Mother    • Stroke Father    • Migraines Brother       No past surgical history on file.     Current Outpatient Medications:   •  amitriptyline (ELAVIL) 10 MG tablet, amitriptyline 10 mg oral tablet take 1 tablet by oral route 2 times a day   Active, Disp: , Rfl:   •  aspirin 81 MG EC tablet, Take 81 mg by mouth Daily., Disp: , Rfl:   •  atenolol (TENORMIN) 50 MG tablet, Take 50 mg by mouth Daily., Disp: , Rfl:   •  atorvastatin (LIPITOR) 10 MG tablet, Take 1 tablet by mouth Daily., Disp: , Rfl:   •  Fremanezumab-vfrm (Ajovy) 225 MG/1.5ML solution auto-injector, Inject 1 pen under the skin into the appropriate area as directed Every 30 (Thirty) Days for 90 days., Disp: 4.5 mL, Rfl: 1  •  gabapentin (Neurontin) 300 MG capsule, Take 1 capsule by mouth Every  "Night., Disp: 90 capsule, Rfl: 0  •  hydroCHLOROthiazide (HYDRODIURIL) 25 MG tablet, hydrochlorothiazide 25 mg oral tablet take 1 tablet (25 mg) by oral route once daily   Active, Disp: , Rfl:   •  ibuprofen (ADVIL,MOTRIN) 800 MG tablet, ibuprofen 800 mg oral tablet take 1 tablet (800 mg) by oral route 3 times per day with food   Active, Disp: , Rfl:   •  lisinopril (PRINIVIL,ZESTRIL) 40 MG tablet, lisinopril 40 mg oral tablet take 1 tablet (40 mg) by oral route once daily   Active, Disp: , Rfl:   •  naproxen sodium (ANAPROX) 275 MG tablet, Take 275 mg by mouth 2 (Two) Times a Day With Meals., Disp: , Rfl:   •  Omega-3 Fatty Acids (fish oil) 1000 MG capsule capsule, Take 1 capsule by mouth Daily With Breakfast., Disp: , Rfl:   •  ondansetron (ZOFRAN) 4 MG tablet, Zofran 4 mg oral tablet take 1 tablet by oral route PRN for migraines   Active, Disp: , Rfl:   •  prazosin (MINIPRESS) 2 MG capsule, Take 2 mg by mouth Daily., Disp: , Rfl:   •  sertraline (ZOLOFT) 100 MG tablet, 200 mg., Disp: , Rfl:     Current Facility-Administered Medications:   •  Fremanezumab-vfrm solution prefilled syringe 225 mg, 225 mg, Subcutaneous, Once, Oropilla, Leopoldo Ruth MD    OBJECTIVE  Vital Signs:   There were no vitals taken for this visit.   Estimated body mass index is 29.99 kg/m² as calculated from the following:    Height as of 10/26/22: 177.8 cm (70\").    Weight as of 10/26/22: 94.8 kg (209 lb).     Wt Readings from Last 3 Encounters:   10/26/22 94.8 kg (209 lb)   10/25/22 94.3 kg (208 lb)   09/08/22 96.2 kg (212 lb)     BP Readings from Last 3 Encounters:   10/26/22 130/88   09/01/22 151/91   08/25/22 171/95       Physical Exam  Neurological:      Mental Status: He is alert.   Psychiatric:         Attention and Perception: Attention normal.         Mood and Affect: Mood normal.         Speech: Speech normal.         Behavior: Behavior normal.         Thought Content: Thought content normal.         Judgment: Judgment normal. "          Result Review        MRI Brain Without Contrast    Result Date: 9/15/2022   Nonspecific white matter abnormality which does not appear greatly changed since the prior study.  Possibilities include chronic microvascular ischemic change and demyelinating disease      VIRY MENJIVAR MD       Electronically Signed and Approved By: VIRY MENJIVAR MD on 9/15/2022 at 12:09                The above data has been reviewed by SARATH Suresh 11/15/2022 09:32 EST.          Patient Care Team:  Neena Vazquez APRN as PCP - General (Nurse Practitioner)      ASSESSMENT & PLAN    Diagnoses and all orders for this visit:    1. Tinnitus, unspecified laterality (Primary)  -     Ambulatory Referral to ENT (Otolaryngology)    2. Dermatitis  -     Ambulatory Referral to Dermatology         Tobacco Use: Medium Risk   • Smoking Tobacco Use: Former   • Smokeless Tobacco Use: Never   • Passive Exposure: Not on file       Follow Up     No follow-ups on file.        Patient was given instructions and counseling regarding his condition or for health maintenance advice. Please see specific information pulled into the AVS if appropriate.   I have reviewed information obtained and documented by others and I have confirmed the accuracy of this documented note.    SARATH Suresh

## 2022-11-16 LAB
ALBUMIN SERPL-MCNC: 4.5 G/DL (ref 3.5–5.2)
ALBUMIN/GLOB SERPL: 1.3 G/DL
ALP SERPL-CCNC: 50 U/L (ref 39–117)
ALT SERPL W P-5'-P-CCNC: 28 U/L (ref 1–41)
ANION GAP SERPL CALCULATED.3IONS-SCNC: 9.1 MMOL/L (ref 5–15)
AST SERPL-CCNC: 27 U/L (ref 1–40)
BILIRUB SERPL-MCNC: 0.4 MG/DL (ref 0–1.2)
BUN SERPL-MCNC: 10 MG/DL (ref 6–20)
BUN/CREAT SERPL: 10.1 (ref 7–25)
CALCIUM SPEC-SCNC: 9.6 MG/DL (ref 8.6–10.5)
CHLORIDE SERPL-SCNC: 99 MMOL/L (ref 98–107)
CO2 SERPL-SCNC: 27.9 MMOL/L (ref 22–29)
CREAT SERPL-MCNC: 0.99 MG/DL (ref 0.76–1.27)
CRP SERPL-MCNC: <0.3 MG/DL (ref 0–0.5)
EGFRCR SERPLBLD CKD-EPI 2021: 87.8 ML/MIN/1.73
ERYTHROCYTE [SEDIMENTATION RATE] IN BLOOD: 3 MM/HR (ref 0–20)
GLOBULIN UR ELPH-MCNC: 3.5 GM/DL
GLUCOSE SERPL-MCNC: 94 MG/DL (ref 65–99)
POTASSIUM SERPL-SCNC: 4.2 MMOL/L (ref 3.5–5.2)
PROT SERPL-MCNC: 8 G/DL (ref 6–8.5)
SODIUM SERPL-SCNC: 136 MMOL/L (ref 136–145)

## 2022-11-18 LAB — MOG AB SER QL CBA IFA: NEGATIVE

## 2022-11-19 LAB
ANA SER QL IF: POSITIVE
ANA SPECKLED TITR SER: ABNORMAL {TITER}
CENTROMERE B AB SER-ACNC: <0.2 AI (ref 0–0.9)
CHROMATIN AB SERPL-ACNC: 2.7 AI (ref 0–0.9)
DSDNA AB SER-ACNC: 5 IU/ML (ref 0–9)
ENA JO1 AB SER-ACNC: <0.2 AI (ref 0–0.9)
ENA RNP AB SER-ACNC: >8 AI (ref 0–0.9)
ENA SCL70 AB SER-ACNC: <0.2 AI (ref 0–0.9)
ENA SM AB SER-ACNC: 2.3 AI (ref 0–0.9)
ENA SS-A AB SER-ACNC: 0.2 AI (ref 0–0.9)
ENA SS-B AB SER-ACNC: 0.2 AI (ref 0–0.9)
LABORATORY COMMENT REPORT: ABNORMAL
Lab: ABNORMAL
Lab: ABNORMAL

## 2022-11-22 LAB — AQP4 H2O CHANNEL IGG SERPL QL: NEGATIVE

## 2022-11-28 ENCOUNTER — HOSPITAL ENCOUNTER (OUTPATIENT)
Dept: MRI IMAGING | Facility: HOSPITAL | Age: 57
Discharge: HOME | End: 2022-11-28
Admitting: PHYSICIAN ASSISTANT

## 2022-11-28 DIAGNOSIS — M75.82 TENDINITIS OF LEFT ROTATOR CUFF: ICD-10-CM

## 2022-11-28 PROCEDURE — 73221 MRI JOINT UPR EXTREM W/O DYE: CPT

## 2022-11-29 ENCOUNTER — HOSPITAL ENCOUNTER (OUTPATIENT)
Dept: MRI IMAGING | Facility: HOSPITAL | Age: 57
Discharge: HOME | End: 2022-11-29
Admitting: PSYCHIATRY & NEUROLOGY

## 2022-11-29 DIAGNOSIS — R41.3 MEMORY LOSS: ICD-10-CM

## 2022-11-29 DIAGNOSIS — F33.1 MODERATE RECURRENT MAJOR DEPRESSION: ICD-10-CM

## 2022-11-29 DIAGNOSIS — R20.2 NUMBNESS AND TINGLING: ICD-10-CM

## 2022-11-29 DIAGNOSIS — R20.0 NUMBNESS AND TINGLING: ICD-10-CM

## 2022-11-29 PROCEDURE — 72141 MRI NECK SPINE W/O DYE: CPT

## 2022-12-08 ENCOUNTER — HOSPITAL ENCOUNTER (OUTPATIENT)
Dept: NEUROLOGY | Facility: HOSPITAL | Age: 57
Discharge: HOME | End: 2022-12-08
Admitting: PSYCHIATRY & NEUROLOGY

## 2022-12-08 DIAGNOSIS — G35 MULTIPLE SCLEROSIS: ICD-10-CM

## 2022-12-08 DIAGNOSIS — R56.9 SEIZURE: ICD-10-CM

## 2022-12-08 PROCEDURE — 95819 EEG AWAKE AND ASLEEP: CPT

## 2022-12-14 ENCOUNTER — HOSPITAL ENCOUNTER (OUTPATIENT)
Dept: MRI IMAGING | Facility: HOSPITAL | Age: 57
Discharge: HOME | End: 2022-12-14

## 2022-12-14 ENCOUNTER — APPOINTMENT (OUTPATIENT)
Dept: MRI IMAGING | Facility: HOSPITAL | Age: 57
End: 2022-12-14

## 2022-12-14 DIAGNOSIS — G35 MULTIPLE SCLEROSIS: ICD-10-CM

## 2022-12-14 PROCEDURE — A9577 INJ MULTIHANCE: HCPCS | Performed by: PSYCHIATRY & NEUROLOGY

## 2022-12-14 PROCEDURE — 70553 MRI BRAIN STEM W/O & W/DYE: CPT

## 2022-12-14 PROCEDURE — 0 GADOBENATE DIMEGLUMINE 529 MG/ML SOLUTION: Performed by: PSYCHIATRY & NEUROLOGY

## 2022-12-14 PROCEDURE — 72157 MRI CHEST SPINE W/O & W/DYE: CPT

## 2022-12-14 RX ADMIN — GADOBENATE DIMEGLUMINE 20 ML: 529 INJECTION, SOLUTION INTRAVENOUS at 16:44

## 2022-12-16 ENCOUNTER — TELEPHONE (OUTPATIENT)
Dept: GASTROENTEROLOGY | Facility: CLINIC | Age: 57
End: 2022-12-16

## 2022-12-16 ENCOUNTER — PREP FOR SURGERY (OUTPATIENT)
Dept: OTHER | Facility: HOSPITAL | Age: 57
End: 2022-12-16

## 2022-12-16 ENCOUNTER — CLINICAL SUPPORT (OUTPATIENT)
Dept: GASTROENTEROLOGY | Facility: CLINIC | Age: 57
End: 2022-12-16

## 2022-12-16 ENCOUNTER — HOSPITAL ENCOUNTER (OUTPATIENT)
Dept: CT IMAGING | Facility: HOSPITAL | Age: 57
Discharge: HOME | End: 2022-12-16
Admitting: PSYCHIATRY & NEUROLOGY

## 2022-12-16 DIAGNOSIS — G35 MULTIPLE SCLEROSIS: ICD-10-CM

## 2022-12-16 DIAGNOSIS — Z12.11 COLON CANCER SCREENING: Primary | ICD-10-CM

## 2022-12-16 DIAGNOSIS — R56.9 SEIZURE: ICD-10-CM

## 2022-12-16 PROCEDURE — 70496 CT ANGIOGRAPHY HEAD: CPT

## 2022-12-16 PROCEDURE — 70498 CT ANGIOGRAPHY NECK: CPT

## 2022-12-16 PROCEDURE — 0 IOPAMIDOL PER 1 ML: Performed by: PSYCHIATRY & NEUROLOGY

## 2022-12-16 RX ORDER — CLOBETASOL PROPIONATE 0.5 MG/G
OINTMENT TOPICAL
COMMUNITY
Start: 2022-12-14

## 2022-12-16 RX ADMIN — IOPAMIDOL 100 ML: 755 INJECTION, SOLUTION INTRAVENOUS at 11:44

## 2022-12-16 NOTE — TELEPHONE ENCOUNTER
Jai Dow  REASON FOR CALL COLON SCREEN  SENT IN PREP 4L  PROCEDURE DATE: 3/6/2023  NO PULMONOLOGIST / NO CARDIOLOGIST / NO BLOOD THINNER PER PT  -PT HAS COPY OF LAST COLON AND WILL BRING IN TO THE OFFICE    Past Medical History:   Diagnosis Date   • Cluster headache     [facility]   • Difficulty walking     Knee replacements   • Head injury     In the [employer] in the back of a track vehicle   • Headache, tension-type     [facility]   • Hypertension    • Memory loss     Increasing, memory loss Family members names, co-workers, recent projects   • Migraine    • Migraine    • PTSD (post-traumatic stress disorder)    • Sleep apnea     Stop using the machine/felt like I couldn’t breath  during nightmares   • Vision loss     Blurred     Allergies   Allergen Reactions   • Amoxil [Amoxicillin] Angioedema     Past Surgical History:   Procedure Laterality Date   • COLONOSCOPY  2015     Social History     Socioeconomic History   • Marital status:    Tobacco Use   • Smoking status: Former     Packs/day: 0.50     Years: 10.00     Pack years: 5.00     Types: Cigarettes     Start date: 3/31/1980     Quit date: 6/10/1990     Years since quittin.5   • Smokeless tobacco: Never   Vaping Use   • Vaping Use: Never used   Substance and Sexual Activity   • Alcohol use: Not Currently     Alcohol/week: 5.0 - 9.0 standard drinks     Types: 1 - 2 Cans of beer, 4 - 7 Shots of liquor per week     Comment: Drank everyday after the [place] War   • Drug use: Never   • Sexual activity: Yes     Partners: Female     Birth control/protection: None     Family History   Problem Relation Age of Onset   • Dementia Mother    • Stroke Father    • Stroke Brother    • Migraines Brother        Current Outpatient Medications:   •  amitriptyline (ELAVIL) 10 MG tablet, amitriptyline 10 mg oral tablet take 1 tablet by oral route 2 times a day   Active, Disp: , Rfl:   •  aspirin 81 MG EC tablet, Take 81 mg by mouth Daily., Disp: ,  Rfl:   •  atenolol (TENORMIN) 50 MG tablet, Take 50 mg by mouth Daily., Disp: , Rfl:   •  atorvastatin (LIPITOR) 10 MG tablet, Take 1 tablet by mouth Daily., Disp: , Rfl:   •  clobetasol (TEMOVATE) 0.05 % ointment, , Disp: , Rfl:   •  Fremanezumab-vfrm (Ajovy) 225 MG/1.5ML solution auto-injector, Inject 1 pen under the skin into the appropriate area as directed Every 30 (Thirty) Days for 90 days., Disp: 4.5 mL, Rfl: 1  •  gabapentin (Neurontin) 300 MG capsule, Take 1 capsule by mouth Every Night. (Patient taking differently: Take 300 mg by mouth As Needed.), Disp: 90 capsule, Rfl: 0  •  hydroCHLOROthiazide (HYDRODIURIL) 25 MG tablet, hydrochlorothiazide 25 mg oral tablet take 1 tablet (25 mg) by oral route once daily   Active, Disp: , Rfl:   •  ibuprofen (ADVIL,MOTRIN) 800 MG tablet, ibuprofen 800 mg oral tablet take 1 tablet (800 mg) by oral route 3 times per day with food   Active, Disp: , Rfl:   •  lisinopril (PRINIVIL,ZESTRIL) 40 MG tablet, lisinopril 40 mg oral tablet take 1 tablet (40 mg) by oral route once daily   Active, Disp: , Rfl:   •  mupirocin (BACTROBAN) 2 % ointment, Apply  topically to the appropriate area as directed 3 (Three) Times a Day. apply topically to the affected area three times daily, Disp: , Rfl:   •  naproxen sodium (ANAPROX) 275 MG tablet, Take 275 mg by mouth 2 (Two) Times a Day With Meals., Disp: , Rfl:   •  Omega-3 Fatty Acids (fish oil) 1000 MG capsule capsule, Take 1 capsule by mouth Daily With Breakfast., Disp: , Rfl:   •  ondansetron (ZOFRAN) 4 MG tablet, Zofran 4 mg oral tablet take 1 tablet by oral route PRN for migraines   Active, Disp: , Rfl:   •  prazosin (MINIPRESS) 2 MG capsule, Take 2 mg by mouth Daily., Disp: , Rfl:   •  pregabalin (Lyrica) 75 MG capsule, Take 1 capsule by mouth 2 (Two) Times a Day., Disp: 60 capsule, Rfl: 2  •  Rimegepant Sulfate (Nurtec) 75 MG tablet dispersible tablet, Take 1 tablet by mouth Take As Directed for 6 doses., Disp: 6 tablet, Rfl: 0  •   sertraline (ZOLOFT) 100 MG tablet, 200 mg., Disp: , Rfl:     Current Facility-Administered Medications:   •  Fremanezumab-vfrm solution prefilled syringe 225 mg, 225 mg, Subcutaneous, Once, OropillLeopoldo odell MD

## 2022-12-20 ENCOUNTER — OFFICE VISIT (OUTPATIENT)
Dept: NEUROLOGY | Facility: CLINIC | Age: 57
End: 2022-12-20

## 2022-12-20 ENCOUNTER — LAB (OUTPATIENT)
Dept: LAB | Facility: HOSPITAL | Age: 57
End: 2022-12-20

## 2022-12-20 VITALS
DIASTOLIC BLOOD PRESSURE: 88 MMHG | SYSTOLIC BLOOD PRESSURE: 149 MMHG | HEART RATE: 59 BPM | HEIGHT: 70 IN | OXYGEN SATURATION: 100 % | BODY MASS INDEX: 31.64 KG/M2 | WEIGHT: 221 LBS

## 2022-12-20 DIAGNOSIS — R41.3 MEMORY LOSS: ICD-10-CM

## 2022-12-20 DIAGNOSIS — D33.3 ACOUSTIC NEUROMA: ICD-10-CM

## 2022-12-20 DIAGNOSIS — F33.1 MODERATE RECURRENT MAJOR DEPRESSION: ICD-10-CM

## 2022-12-20 DIAGNOSIS — G35 MULTIPLE SCLEROSIS: ICD-10-CM

## 2022-12-20 DIAGNOSIS — G35 MULTIPLE SCLEROSIS: Primary | ICD-10-CM

## 2022-12-20 DIAGNOSIS — Z09: ICD-10-CM

## 2022-12-20 PROBLEM — Z12.11 COLON CANCER SCREENING: Status: ACTIVE | Noted: 2022-12-20

## 2022-12-20 LAB
ALBUMIN SERPL-MCNC: 4.5 G/DL (ref 3.5–5.2)
ALBUMIN/GLOB SERPL: 1.3 G/DL
ALP SERPL-CCNC: 45 U/L (ref 39–117)
ALT SERPL W P-5'-P-CCNC: 17 U/L (ref 1–41)
ANION GAP SERPL CALCULATED.3IONS-SCNC: 5.4 MMOL/L (ref 5–15)
AST SERPL-CCNC: 20 U/L (ref 1–40)
BASOPHILS # BLD AUTO: 0.05 10*3/MM3 (ref 0–0.2)
BASOPHILS NFR BLD AUTO: 1.1 % (ref 0–1.5)
BILIRUB SERPL-MCNC: 0.5 MG/DL (ref 0–1.2)
BUN SERPL-MCNC: 10 MG/DL (ref 6–20)
BUN/CREAT SERPL: 9.8 (ref 7–25)
CALCIUM SPEC-SCNC: 9.7 MG/DL (ref 8.6–10.5)
CHLORIDE SERPL-SCNC: 101 MMOL/L (ref 98–107)
CO2 SERPL-SCNC: 30.6 MMOL/L (ref 22–29)
CREAT SERPL-MCNC: 1.02 MG/DL (ref 0.76–1.27)
DEPRECATED RDW RBC AUTO: 39 FL (ref 37–54)
EGFRCR SERPLBLD CKD-EPI 2021: 84.7 ML/MIN/1.73
EOSINOPHIL # BLD AUTO: 0.12 10*3/MM3 (ref 0–0.4)
EOSINOPHIL NFR BLD AUTO: 2.6 % (ref 0.3–6.2)
ERYTHROCYTE [DISTWIDTH] IN BLOOD BY AUTOMATED COUNT: 12.8 % (ref 12.3–15.4)
GLOBULIN UR ELPH-MCNC: 3.4 GM/DL
GLUCOSE SERPL-MCNC: 84 MG/DL (ref 65–99)
HBV SURFACE AB SER RIA-ACNC: NORMAL
HBV SURFACE AG SERPL QL IA: NORMAL
HCT VFR BLD AUTO: 40.3 % (ref 37.5–51)
HCV AB SER DONR QL: NORMAL
HGB BLD-MCNC: 13.6 G/DL (ref 13–17.7)
IMM GRANULOCYTES # BLD AUTO: 0.01 10*3/MM3 (ref 0–0.05)
IMM GRANULOCYTES NFR BLD AUTO: 0.2 % (ref 0–0.5)
LYMPHOCYTES # BLD AUTO: 1.5 10*3/MM3 (ref 0.7–3.1)
LYMPHOCYTES NFR BLD AUTO: 32.6 % (ref 19.6–45.3)
MCH RBC QN AUTO: 27.9 PG (ref 26.6–33)
MCHC RBC AUTO-ENTMCNC: 33.7 G/DL (ref 31.5–35.7)
MCV RBC AUTO: 82.6 FL (ref 79–97)
MONOCYTES # BLD AUTO: 0.6 10*3/MM3 (ref 0.1–0.9)
MONOCYTES NFR BLD AUTO: 13 % (ref 5–12)
NEUTROPHILS NFR BLD AUTO: 2.32 10*3/MM3 (ref 1.7–7)
NEUTROPHILS NFR BLD AUTO: 50.5 % (ref 42.7–76)
PLATELET # BLD AUTO: 201 10*3/MM3 (ref 140–450)
PMV BLD AUTO: 10.2 FL (ref 6–12)
POTASSIUM SERPL-SCNC: 4.6 MMOL/L (ref 3.5–5.2)
PROT SERPL-MCNC: 7.9 G/DL (ref 6–8.5)
RBC # BLD AUTO: 4.88 10*6/MM3 (ref 4.14–5.8)
SODIUM SERPL-SCNC: 137 MMOL/L (ref 136–145)
TSH SERPL DL<=0.05 MIU/L-ACNC: 1.04 UIU/ML (ref 0.27–4.2)
WBC NRBC COR # BLD: 4.6 10*3/MM3 (ref 3.4–10.8)

## 2022-12-20 PROCEDURE — 36415 COLL VENOUS BLD VENIPUNCTURE: CPT

## 2022-12-20 PROCEDURE — 80053 COMPREHEN METABOLIC PANEL: CPT

## 2022-12-20 PROCEDURE — 86803 HEPATITIS C AB TEST: CPT

## 2022-12-20 PROCEDURE — 86704 HEP B CORE ANTIBODY TOTAL: CPT

## 2022-12-20 PROCEDURE — 85025 COMPLETE CBC W/AUTO DIFF WBC: CPT

## 2022-12-20 PROCEDURE — 87340 HEPATITIS B SURFACE AG IA: CPT

## 2022-12-20 PROCEDURE — 84443 ASSAY THYROID STIM HORMONE: CPT

## 2022-12-20 PROCEDURE — 86706 HEP B SURFACE ANTIBODY: CPT

## 2022-12-20 PROCEDURE — 99214 OFFICE O/P EST MOD 30 MIN: CPT | Performed by: PSYCHIATRY & NEUROLOGY

## 2022-12-20 NOTE — PROGRESS NOTES
This is a very pleasant 57-year-old gentleman who had seen in initial consultation for multiple sclerosis.  I have not been able to see any of his outside imaging of the brain.  He is here today to review imaging.  He reports no new neurologic issues since I saw him last.        I reviewed the MRI of the brain which shows scattered subcortical white matter lesions in a classic pattern for multiple sclerosis.  None of these lesions enhance.  He may have also a small acoustic neuroma.  He does have some T1 black holes.  There were no lesions in the cervical or thoracic cord although he does have multilevel degenerative disc disease.  His CTA of the head and neck showed no major vascular occlusion and he had a normal routine EEG.        In summary this a very pleasant 57-year-old gentleman with multiple sclerosis.  We had a long discussion today regarding treatment options and he would like to try medication for his multiple sclerosis.  We will plan on starting him on Ocrevus.  I reviewed the risks and benefits of this medication with him at length including the rare possibility of serious allergic reactions, life-threatening infections, and malignancy.  He understands these risks and would like to proceed.  I am going to draw screening blood work today.  He will follow-up with me in 6-day weeks to make sure he is on the medication and tolerating it.  Of course if he has any problems in the interim I be happy to see him sooner.      Diagnoses and all orders for this visit:    1. Multiple sclerosis (HCC) (Primary)  -     Comprehensive Metabolic Panel; Future  -     CBC & Differential; Future  -     Hepatitis C Antibody; Future  -     Hepatitis B Core Antibody, Total; Future  -     Hepatitis B Surface Antibody; Future  -     Hepatitis B Surface Antigen; Future    2. Acoustic neuroma (HCC)  -     Ambulatory Referral to Neurosurgery    3. ENT (otolaryngology) follow-up encounter  -     Ambulatory Referral to  Neurosurgery      I spent 30 minutes in patient care.

## 2022-12-21 DIAGNOSIS — D33.3 ACOUSTIC NEUROMA: Primary | ICD-10-CM

## 2022-12-22 DIAGNOSIS — G35 MULTIPLE SCLEROSIS: ICD-10-CM

## 2022-12-22 DIAGNOSIS — G35 MULTIPLE SCLEROSIS: Primary | ICD-10-CM

## 2022-12-22 LAB — HBV CORE AB SERPL QL IA: NEGATIVE

## 2022-12-27 NOTE — TELEPHONE ENCOUNTER
Rx Refill Note  Requested Prescriptions     Pending Prescriptions Disp Refills   • pregabalin (Lyrica) 75 MG capsule 60 capsule 0     Sig: Take 1 capsule by mouth 2 (Two) Times a Day.      Last office visit with prescribing clinician: 12/20/2022   Last telemedicine visit with prescribing clinician: Visit date not found   Next office visit with prescribing clinician: 2/24/2023     Is Refill Pharmacy correct? Due to issues trying to call medication in to [facility], pt ok'd sending medication in to local [facility].     Kate Hunt LPN  12/27/22, 14:20 EST       Dr. Vela out of office. He approved/requested medication being sent in via phone in.

## 2023-01-03 DIAGNOSIS — G35 MULTIPLE SCLEROSIS: Primary | ICD-10-CM

## 2023-01-03 RX ORDER — ACETAMINOPHEN 325 MG/1
650 TABLET ORAL ONCE
Status: CANCELLED | OUTPATIENT
Start: 2023-01-03

## 2023-01-03 RX ORDER — MEPERIDINE HYDROCHLORIDE 50 MG/ML
25 INJECTION INTRAMUSCULAR; INTRAVENOUS; SUBCUTANEOUS
Status: CANCELLED | OUTPATIENT
Start: 2023-01-03

## 2023-01-03 RX ORDER — PREGABALIN 75 MG/1
75 CAPSULE ORAL 2 TIMES DAILY
Qty: 60 CAPSULE | Refills: 0 | OUTPATIENT
Start: 2023-01-03 | End: 2024-01-03

## 2023-01-03 RX ORDER — FAMOTIDINE 10 MG/ML
20 INJECTION, SOLUTION INTRAVENOUS AS NEEDED
Status: CANCELLED | OUTPATIENT
Start: 2023-01-03

## 2023-01-03 RX ORDER — ACETAMINOPHEN 325 MG/1
650 TABLET ORAL EVERY 6 HOURS PRN
Status: CANCELLED | OUTPATIENT
Start: 2023-01-03

## 2023-01-03 RX ORDER — DIPHENHYDRAMINE HYDROCHLORIDE 50 MG/ML
50 INJECTION INTRAMUSCULAR; INTRAVENOUS AS NEEDED
Status: CANCELLED | OUTPATIENT
Start: 2023-01-03

## 2023-01-03 RX ORDER — SODIUM CHLORIDE 9 MG/ML
250 INJECTION, SOLUTION INTRAVENOUS ONCE
Status: CANCELLED | OUTPATIENT
Start: 2023-01-03

## 2023-01-03 RX ORDER — DIPHENHYDRAMINE HYDROCHLORIDE 50 MG/ML
25 INJECTION INTRAMUSCULAR; INTRAVENOUS ONCE
Status: CANCELLED | OUTPATIENT
Start: 2023-01-03

## 2023-01-03 RX ORDER — IBUPROFEN 400 MG/1
400 TABLET ORAL EVERY 6 HOURS PRN
Status: CANCELLED | OUTPATIENT
Start: 2023-01-03

## 2023-01-10 ENCOUNTER — TELEPHONE (OUTPATIENT)
Dept: NEUROLOGY | Facility: CLINIC | Age: 58
End: 2023-01-10

## 2023-01-10 NOTE — TELEPHONE ENCOUNTER
Caller: Victor M Jai MISAEL    Relationship: Self    Best call back number: [phone]      Requested Prescriptions:   Requested Prescriptions      No prescriptions requested or ordered in this encounter        Pharmacy where request should be sent: [facility] - [address] [phone] [facility] [phone] FX     Additional details provided by patient: PATIENT WOULD LIKE FOR HIS AJOVY PRESCRIPTION TO STOP COMING FROM DR. BENSON AND START COMING FROM DR. ESPARZA AND BE SENT TO HIS MAIL ORDER PHARMACY, [facility]. HE WOULD LIKE SOMEONE TO CALL HIM AND DISCUSS AS WELL.    Does the patient have less than a 3 day supply:  [] Yes  [x] No    Would you like a call back once the refill request has been completed: [x] Yes [] No    If the office needs to give you a call back, can they leave a voicemail: [x] Yes [] No    Evelia Lawson Rep   01/10/23 09:39 EST

## 2023-01-10 NOTE — TELEPHONE ENCOUNTER
Spoke with Amy at the pharmacy. He said they can start taking care of this medication, but to make sure you are ok with covering the medication going forward- clr

## 2023-01-11 ENCOUNTER — SPECIALTY PHARMACY (OUTPATIENT)
Dept: ONCOLOGY | Facility: HOSPITAL | Age: 58
End: 2023-01-11
Payer: OTHER GOVERNMENT

## 2023-01-12 ENCOUNTER — SPECIALTY PHARMACY (OUTPATIENT)
Dept: ONCOLOGY | Facility: HOSPITAL | Age: 58
End: 2023-01-12
Payer: OTHER GOVERNMENT

## 2023-01-12 RX ORDER — FREMANEZUMAB-VFRM 225 MG/1.5ML
225 INJECTION SUBCUTANEOUS
Qty: 4.5 ML | Refills: 3 | Status: SHIPPED | OUTPATIENT
Start: 2023-01-12

## 2023-01-18 ENCOUNTER — HOSPITAL ENCOUNTER (OUTPATIENT)
Dept: INFUSION THERAPY | Facility: HOSPITAL | Age: 58
Setting detail: INFUSION SERIES
Discharge: HOME | End: 2023-01-18
Payer: OTHER GOVERNMENT

## 2023-01-18 VITALS
OXYGEN SATURATION: 97 % | HEIGHT: 71 IN | HEART RATE: 85 BPM | BODY MASS INDEX: 31.79 KG/M2 | SYSTOLIC BLOOD PRESSURE: 150 MMHG | RESPIRATION RATE: 20 BRPM | TEMPERATURE: 98.6 F | DIASTOLIC BLOOD PRESSURE: 91 MMHG | WEIGHT: 227.07 LBS

## 2023-01-18 DIAGNOSIS — G35 MULTIPLE SCLEROSIS: Primary | ICD-10-CM

## 2023-01-18 LAB
ALBUMIN SERPL-MCNC: 4.5 G/DL (ref 3.5–5.2)
ALBUMIN/GLOB SERPL: 1.4 G/DL
ALP SERPL-CCNC: 52 U/L (ref 39–117)
ALT SERPL W P-5'-P-CCNC: 12 U/L (ref 1–41)
ANION GAP SERPL CALCULATED.3IONS-SCNC: 10.5 MMOL/L (ref 5–15)
AST SERPL-CCNC: 19 U/L (ref 1–40)
BASOPHILS # BLD AUTO: 0.07 10*3/MM3 (ref 0–0.2)
BASOPHILS NFR BLD AUTO: 1.4 % (ref 0–1.5)
BILIRUB SERPL-MCNC: 0.5 MG/DL (ref 0–1.2)
BUN SERPL-MCNC: 10 MG/DL (ref 6–20)
BUN/CREAT SERPL: 9.8 (ref 7–25)
CALCIUM SPEC-SCNC: 9.5 MG/DL (ref 8.6–10.5)
CHLORIDE SERPL-SCNC: 100 MMOL/L (ref 98–107)
CO2 SERPL-SCNC: 23.5 MMOL/L (ref 22–29)
CREAT SERPL-MCNC: 1.02 MG/DL (ref 0.76–1.27)
DEPRECATED RDW RBC AUTO: 36.9 FL (ref 37–54)
EGFRCR SERPLBLD CKD-EPI 2021: 84.7 ML/MIN/1.73
EOSINOPHIL # BLD AUTO: 0.13 10*3/MM3 (ref 0–0.4)
EOSINOPHIL NFR BLD AUTO: 2.6 % (ref 0.3–6.2)
ERYTHROCYTE [DISTWIDTH] IN BLOOD BY AUTOMATED COUNT: 12.8 % (ref 12.3–15.4)
GLOBULIN UR ELPH-MCNC: 3.2 GM/DL
GLUCOSE SERPL-MCNC: 113 MG/DL (ref 65–99)
HCT VFR BLD AUTO: 37.8 % (ref 37.5–51)
HGB BLD-MCNC: 13.5 G/DL (ref 13–17.7)
IMM GRANULOCYTES # BLD AUTO: 0.01 10*3/MM3 (ref 0–0.05)
IMM GRANULOCYTES NFR BLD AUTO: 0.2 % (ref 0–0.5)
LYMPHOCYTES # BLD AUTO: 1.17 10*3/MM3 (ref 0.7–3.1)
LYMPHOCYTES NFR BLD AUTO: 23.4 % (ref 19.6–45.3)
MCH RBC QN AUTO: 28.7 PG (ref 26.6–33)
MCHC RBC AUTO-ENTMCNC: 35.7 G/DL (ref 31.5–35.7)
MCV RBC AUTO: 80.3 FL (ref 79–97)
MONOCYTES # BLD AUTO: 0.8 10*3/MM3 (ref 0.1–0.9)
MONOCYTES NFR BLD AUTO: 16 % (ref 5–12)
NEUTROPHILS NFR BLD AUTO: 2.81 10*3/MM3 (ref 1.7–7)
NEUTROPHILS NFR BLD AUTO: 56.4 % (ref 42.7–76)
NRBC BLD AUTO-RTO: 0 /100 WBC (ref 0–0.2)
PLATELET # BLD AUTO: 216 10*3/MM3 (ref 140–450)
PMV BLD AUTO: 10.6 FL (ref 6–12)
POTASSIUM SERPL-SCNC: 4 MMOL/L (ref 3.5–5.2)
PROT SERPL-MCNC: 7.7 G/DL (ref 6–8.5)
RBC # BLD AUTO: 4.71 10*6/MM3 (ref 4.14–5.8)
SODIUM SERPL-SCNC: 134 MMOL/L (ref 136–145)
WBC NRBC COR # BLD: 4.99 10*3/MM3 (ref 3.4–10.8)

## 2023-01-18 PROCEDURE — 96366 THER/PROPH/DIAG IV INF ADDON: CPT

## 2023-01-18 PROCEDURE — 80053 COMPREHEN METABOLIC PANEL: CPT | Performed by: PSYCHIATRY & NEUROLOGY

## 2023-01-18 PROCEDURE — 96375 TX/PRO/DX INJ NEW DRUG ADDON: CPT

## 2023-01-18 PROCEDURE — 25010000002 METHYLPREDNISOLONE PER 125 MG: Performed by: PSYCHIATRY & NEUROLOGY

## 2023-01-18 PROCEDURE — 96365 THER/PROPH/DIAG IV INF INIT: CPT

## 2023-01-18 PROCEDURE — 85025 COMPLETE CBC W/AUTO DIFF WBC: CPT | Performed by: PSYCHIATRY & NEUROLOGY

## 2023-01-18 PROCEDURE — 96374 THER/PROPH/DIAG INJ IV PUSH: CPT

## 2023-01-18 PROCEDURE — 25010000002 OCRELIZUMAB 300 MG/10ML SOLUTION 300 MG VIAL: Performed by: PSYCHIATRY & NEUROLOGY

## 2023-01-18 PROCEDURE — 96415 CHEMO IV INFUSION ADDL HR: CPT

## 2023-01-18 PROCEDURE — 96413 CHEMO IV INFUSION 1 HR: CPT

## 2023-01-18 PROCEDURE — 25010000002 DIPHENHYDRAMINE PER 50 MG: Performed by: PSYCHIATRY & NEUROLOGY

## 2023-01-18 RX ORDER — SODIUM CHLORIDE 9 MG/ML
250 INJECTION, SOLUTION INTRAVENOUS ONCE
Status: DISCONTINUED | OUTPATIENT
Start: 2023-01-18 | End: 2023-01-20 | Stop reason: HOSPADM

## 2023-01-18 RX ORDER — METHYLPREDNISOLONE SODIUM SUCCINATE 125 MG/2ML
100 INJECTION, POWDER, LYOPHILIZED, FOR SOLUTION INTRAMUSCULAR; INTRAVENOUS ONCE
Status: CANCELLED | OUTPATIENT
Start: 2023-02-01

## 2023-01-18 RX ORDER — FAMOTIDINE 10 MG/ML
20 INJECTION, SOLUTION INTRAVENOUS AS NEEDED
Status: CANCELLED | OUTPATIENT
Start: 2023-02-01

## 2023-01-18 RX ORDER — ACETAMINOPHEN 325 MG/1
650 TABLET ORAL EVERY 6 HOURS PRN
Status: CANCELLED | OUTPATIENT
Start: 2023-02-01

## 2023-01-18 RX ORDER — DIPHENHYDRAMINE HYDROCHLORIDE 50 MG/ML
50 INJECTION INTRAMUSCULAR; INTRAVENOUS AS NEEDED
Status: CANCELLED | OUTPATIENT
Start: 2023-02-01

## 2023-01-18 RX ORDER — METHYLPREDNISOLONE SODIUM SUCCINATE 125 MG/2ML
100 INJECTION, POWDER, LYOPHILIZED, FOR SOLUTION INTRAMUSCULAR; INTRAVENOUS ONCE
Status: COMPLETED | OUTPATIENT
Start: 2023-01-18 | End: 2023-01-18

## 2023-01-18 RX ORDER — SODIUM CHLORIDE 9 MG/ML
250 INJECTION, SOLUTION INTRAVENOUS ONCE
Status: CANCELLED | OUTPATIENT
Start: 2023-02-01

## 2023-01-18 RX ORDER — ACETAMINOPHEN 325 MG/1
650 TABLET ORAL ONCE
Status: CANCELLED | OUTPATIENT
Start: 2023-02-01

## 2023-01-18 RX ORDER — MEPERIDINE HYDROCHLORIDE 50 MG/ML
25 INJECTION INTRAMUSCULAR; INTRAVENOUS; SUBCUTANEOUS
Status: CANCELLED | OUTPATIENT
Start: 2023-02-01

## 2023-01-18 RX ORDER — DIPHENHYDRAMINE HYDROCHLORIDE 50 MG/ML
25 INJECTION INTRAMUSCULAR; INTRAVENOUS ONCE
Status: COMPLETED | OUTPATIENT
Start: 2023-01-18 | End: 2023-01-18

## 2023-01-18 RX ORDER — IBUPROFEN 400 MG/1
400 TABLET ORAL EVERY 6 HOURS PRN
Status: CANCELLED | OUTPATIENT
Start: 2023-02-01

## 2023-01-18 RX ORDER — ACETAMINOPHEN 325 MG/1
650 TABLET ORAL ONCE
Status: COMPLETED | OUTPATIENT
Start: 2023-01-18 | End: 2023-01-18

## 2023-01-18 RX ORDER — DIPHENHYDRAMINE HYDROCHLORIDE 50 MG/ML
25 INJECTION INTRAMUSCULAR; INTRAVENOUS ONCE
Status: CANCELLED | OUTPATIENT
Start: 2023-02-01

## 2023-01-18 RX ADMIN — METHYLPREDNISOLONE SODIUM SUCCINATE 100 MG: 125 INJECTION, POWDER, FOR SOLUTION INTRAMUSCULAR; INTRAVENOUS at 09:51

## 2023-01-18 RX ADMIN — DIPHENHYDRAMINE HYDROCHLORIDE 25 MG: 50 INJECTION, SOLUTION INTRAMUSCULAR; INTRAVENOUS at 09:54

## 2023-01-18 RX ADMIN — ACETAMINOPHEN 650 MG: 325 TABLET ORAL at 09:51

## 2023-01-18 RX ADMIN — OCRELIZUMAB 300 MG: 300 INJECTION INTRAVENOUS at 10:17

## 2023-01-20 ENCOUNTER — TELEPHONE (OUTPATIENT)
Dept: FAMILY MEDICINE CLINIC | Facility: CLINIC | Age: 58
End: 2023-01-20
Payer: OTHER GOVERNMENT

## 2023-01-20 RX ORDER — LISINOPRIL 40 MG/1
40 TABLET ORAL DAILY
Qty: 30 TABLET | Refills: 0 | Status: SHIPPED | OUTPATIENT
Start: 2023-01-20

## 2023-01-20 NOTE — TELEPHONE ENCOUNTER
Incoming Refill Request      Medication requested (name and dose):   lisinopril (PRINIVIL,ZESTRIL) 40 MG tablet    Pharmacy where request should be sent:   [id number] - [place], [place] - [address]  AT [facility] [address] - [phone]  - [phone] FX    Additional details provided by patient:   PATIENT IS COMPLETELY OUT OF MEDICATION. ADVISED PATIENT REQUEST EMERGENCY REFILL FROM PHARMACY TO COVER THROUGH THE WEEKEND   Best call back number:   [phone]      Does the patient have less than a 3 day supply:  [x] Yes  [] No    Napoleon Avila  01/20/23, 11:42 EST

## 2023-02-01 ENCOUNTER — HOSPITAL ENCOUNTER (OUTPATIENT)
Dept: INFUSION THERAPY | Facility: HOSPITAL | Age: 58
Discharge: HOME | End: 2023-02-01
Admitting: PSYCHIATRY & NEUROLOGY
Payer: OTHER GOVERNMENT

## 2023-02-01 VITALS
HEIGHT: 70 IN | SYSTOLIC BLOOD PRESSURE: 130 MMHG | BODY MASS INDEX: 31.28 KG/M2 | HEART RATE: 72 BPM | RESPIRATION RATE: 20 BRPM | WEIGHT: 218.48 LBS | OXYGEN SATURATION: 96 % | TEMPERATURE: 97.8 F | DIASTOLIC BLOOD PRESSURE: 88 MMHG

## 2023-02-01 DIAGNOSIS — G35 MULTIPLE SCLEROSIS: Primary | ICD-10-CM

## 2023-02-01 LAB
ALBUMIN SERPL-MCNC: 4.4 G/DL (ref 3.5–5.2)
ALBUMIN/GLOB SERPL: 1.4 G/DL
ALP SERPL-CCNC: 45 U/L (ref 39–117)
ALT SERPL W P-5'-P-CCNC: 12 U/L (ref 1–41)
ANION GAP SERPL CALCULATED.3IONS-SCNC: 9.2 MMOL/L (ref 5–15)
AST SERPL-CCNC: 18 U/L (ref 1–40)
BASOPHILS # BLD AUTO: 0.06 10*3/MM3 (ref 0–0.2)
BASOPHILS NFR BLD AUTO: 1.5 % (ref 0–1.5)
BILIRUB SERPL-MCNC: 0.5 MG/DL (ref 0–1.2)
BUN SERPL-MCNC: 11 MG/DL (ref 6–20)
BUN/CREAT SERPL: 10.6 (ref 7–25)
CALCIUM SPEC-SCNC: 9.3 MG/DL (ref 8.6–10.5)
CHLORIDE SERPL-SCNC: 101 MMOL/L (ref 98–107)
CO2 SERPL-SCNC: 24.8 MMOL/L (ref 22–29)
CREAT SERPL-MCNC: 1.04 MG/DL (ref 0.76–1.27)
DEPRECATED RDW RBC AUTO: 36.5 FL (ref 37–54)
EGFRCR SERPLBLD CKD-EPI 2021: 82.7 ML/MIN/1.73
EOSINOPHIL # BLD AUTO: 0.08 10*3/MM3 (ref 0–0.4)
EOSINOPHIL NFR BLD AUTO: 2 % (ref 0.3–6.2)
ERYTHROCYTE [DISTWIDTH] IN BLOOD BY AUTOMATED COUNT: 12.8 % (ref 12.3–15.4)
GLOBULIN UR ELPH-MCNC: 3.1 GM/DL
GLUCOSE SERPL-MCNC: 110 MG/DL (ref 65–99)
HCT VFR BLD AUTO: 37.8 % (ref 37.5–51)
HGB BLD-MCNC: 13.3 G/DL (ref 13–17.7)
IMM GRANULOCYTES # BLD AUTO: 0.01 10*3/MM3 (ref 0–0.05)
IMM GRANULOCYTES NFR BLD AUTO: 0.2 % (ref 0–0.5)
LYMPHOCYTES # BLD AUTO: 1.18 10*3/MM3 (ref 0.7–3.1)
LYMPHOCYTES NFR BLD AUTO: 28.8 % (ref 19.6–45.3)
MCH RBC QN AUTO: 28.2 PG (ref 26.6–33)
MCHC RBC AUTO-ENTMCNC: 35.2 G/DL (ref 31.5–35.7)
MCV RBC AUTO: 80.1 FL (ref 79–97)
MONOCYTES # BLD AUTO: 0.6 10*3/MM3 (ref 0.1–0.9)
MONOCYTES NFR BLD AUTO: 14.6 % (ref 5–12)
NEUTROPHILS NFR BLD AUTO: 2.17 10*3/MM3 (ref 1.7–7)
NEUTROPHILS NFR BLD AUTO: 52.9 % (ref 42.7–76)
NRBC BLD AUTO-RTO: 0 /100 WBC (ref 0–0.2)
PLATELET # BLD AUTO: 217 10*3/MM3 (ref 140–450)
PMV BLD AUTO: 10.4 FL (ref 6–12)
POTASSIUM SERPL-SCNC: 4.2 MMOL/L (ref 3.5–5.2)
PROT SERPL-MCNC: 7.5 G/DL (ref 6–8.5)
RBC # BLD AUTO: 4.72 10*6/MM3 (ref 4.14–5.8)
SODIUM SERPL-SCNC: 135 MMOL/L (ref 136–145)
WBC NRBC COR # BLD: 4.1 10*3/MM3 (ref 3.4–10.8)

## 2023-02-01 PROCEDURE — 85025 COMPLETE CBC W/AUTO DIFF WBC: CPT | Performed by: PSYCHIATRY & NEUROLOGY

## 2023-02-01 PROCEDURE — 25010000002 METHYLPREDNISOLONE PER 125 MG: Performed by: PSYCHIATRY & NEUROLOGY

## 2023-02-01 PROCEDURE — 96415 CHEMO IV INFUSION ADDL HR: CPT

## 2023-02-01 PROCEDURE — 25010000002 OCRELIZUMAB 300 MG/10ML SOLUTION 300 MG VIAL: Performed by: PSYCHIATRY & NEUROLOGY

## 2023-02-01 PROCEDURE — 96365 THER/PROPH/DIAG IV INF INIT: CPT

## 2023-02-01 PROCEDURE — 96366 THER/PROPH/DIAG IV INF ADDON: CPT

## 2023-02-01 PROCEDURE — 96375 TX/PRO/DX INJ NEW DRUG ADDON: CPT

## 2023-02-01 PROCEDURE — 96413 CHEMO IV INFUSION 1 HR: CPT

## 2023-02-01 PROCEDURE — 96374 THER/PROPH/DIAG INJ IV PUSH: CPT

## 2023-02-01 PROCEDURE — 80053 COMPREHEN METABOLIC PANEL: CPT | Performed by: PSYCHIATRY & NEUROLOGY

## 2023-02-01 PROCEDURE — 25010000002 DIPHENHYDRAMINE PER 50 MG: Performed by: PSYCHIATRY & NEUROLOGY

## 2023-02-01 RX ORDER — METHYLPREDNISOLONE SODIUM SUCCINATE 125 MG/2ML
100 INJECTION, POWDER, LYOPHILIZED, FOR SOLUTION INTRAMUSCULAR; INTRAVENOUS ONCE
Status: CANCELLED | OUTPATIENT
Start: 2023-02-15

## 2023-02-01 RX ORDER — IBUPROFEN 400 MG/1
400 TABLET ORAL EVERY 6 HOURS PRN
OUTPATIENT
Start: 2023-02-15

## 2023-02-01 RX ORDER — DIPHENHYDRAMINE HYDROCHLORIDE 50 MG/ML
25 INJECTION INTRAMUSCULAR; INTRAVENOUS ONCE
Status: CANCELLED | OUTPATIENT
Start: 2023-02-15

## 2023-02-01 RX ORDER — ACETAMINOPHEN 325 MG/1
650 TABLET ORAL EVERY 6 HOURS PRN
OUTPATIENT
Start: 2023-02-15

## 2023-02-01 RX ORDER — DIPHENHYDRAMINE HYDROCHLORIDE 50 MG/ML
50 INJECTION INTRAMUSCULAR; INTRAVENOUS AS NEEDED
OUTPATIENT
Start: 2023-02-15

## 2023-02-01 RX ORDER — ACETAMINOPHEN 325 MG/1
650 TABLET ORAL ONCE
Status: COMPLETED | OUTPATIENT
Start: 2023-02-01 | End: 2023-02-01

## 2023-02-01 RX ORDER — SODIUM CHLORIDE 9 MG/ML
250 INJECTION, SOLUTION INTRAVENOUS ONCE
Status: DISCONTINUED | OUTPATIENT
Start: 2023-02-01 | End: 2023-02-03 | Stop reason: HOSPADM

## 2023-02-01 RX ORDER — FAMOTIDINE 10 MG/ML
20 INJECTION, SOLUTION INTRAVENOUS AS NEEDED
OUTPATIENT
Start: 2023-02-15

## 2023-02-01 RX ORDER — ACETAMINOPHEN 325 MG/1
650 TABLET ORAL ONCE
Status: CANCELLED | OUTPATIENT
Start: 2023-02-15

## 2023-02-01 RX ORDER — MEPERIDINE HYDROCHLORIDE 50 MG/ML
25 INJECTION INTRAMUSCULAR; INTRAVENOUS; SUBCUTANEOUS
OUTPATIENT
Start: 2023-02-15

## 2023-02-01 RX ORDER — SODIUM CHLORIDE 9 MG/ML
250 INJECTION, SOLUTION INTRAVENOUS ONCE
Status: CANCELLED | OUTPATIENT
Start: 2023-02-15

## 2023-02-01 RX ORDER — DIPHENHYDRAMINE HYDROCHLORIDE 50 MG/ML
25 INJECTION INTRAMUSCULAR; INTRAVENOUS ONCE
Status: COMPLETED | OUTPATIENT
Start: 2023-02-01 | End: 2023-02-01

## 2023-02-01 RX ORDER — METHYLPREDNISOLONE SODIUM SUCCINATE 125 MG/2ML
100 INJECTION, POWDER, LYOPHILIZED, FOR SOLUTION INTRAMUSCULAR; INTRAVENOUS ONCE
Status: COMPLETED | OUTPATIENT
Start: 2023-02-01 | End: 2023-02-01

## 2023-02-01 RX ADMIN — OCRELIZUMAB 300 MG: 300 INJECTION INTRAVENOUS at 10:49

## 2023-02-01 RX ADMIN — ACETAMINOPHEN 650 MG: 325 TABLET ORAL at 10:24

## 2023-02-01 RX ADMIN — METHYLPREDNISOLONE SODIUM SUCCINATE 100 MG: 125 INJECTION, POWDER, FOR SOLUTION INTRAMUSCULAR; INTRAVENOUS at 10:24

## 2023-02-01 RX ADMIN — DIPHENHYDRAMINE HYDROCHLORIDE 25 MG: 50 INJECTION, SOLUTION INTRAMUSCULAR; INTRAVENOUS at 10:27

## 2023-02-02 ENCOUNTER — OFFICE VISIT (OUTPATIENT)
Dept: ORTHOPEDIC SURGERY | Facility: CLINIC | Age: 58
End: 2023-02-02
Payer: OTHER GOVERNMENT

## 2023-02-02 VITALS — HEIGHT: 70 IN | BODY MASS INDEX: 31.21 KG/M2 | WEIGHT: 218 LBS

## 2023-02-02 DIAGNOSIS — S43.432A DEGENERATIVE SUPERIOR LABRAL ANTERIOR-TO-POSTERIOR (SLAP) TEAR OF LEFT SHOULDER: Primary | ICD-10-CM

## 2023-02-02 PROCEDURE — 99213 OFFICE O/P EST LOW 20 MIN: CPT | Performed by: ORTHOPAEDIC SURGERY

## 2023-02-02 NOTE — PROGRESS NOTES
"Chief Complaint  Follow-up of the Left Shoulder     Subjective      Jai Dow presents to [facility] for follow-up of left shoulder pain and feeling of instability.  He was seen in office on 10/25/2022 and diagnosed with left shoulder rotator cuff tendinitis and received a left shoulder steroid injection.  He presents today for follow-up reporting he has noted some improvement in the pain he had experienced, however, reports at least 4 occasions where he feels as if his shoulder \"popped out\".  He reports that he was able to manually put his shoulder back into place.  He states these instances have occurred with normal ADLs, including hanging shirt, reaching for water on a shelf, or picking up a bottle of water.     Allergies   Allergen Reactions   • Amoxil [Amoxicillin] Angioedema        Social History     Socioeconomic History   • Marital status:    Tobacco Use   • Smoking status: Former     Packs/day: 0.50     Years: 10.00     Pack years: 5.00     Types: Cigarettes     Start date: 3/31/1980     Quit date: 6/10/1990     Years since quittin.6   • Smokeless tobacco: Never   Vaping Use   • Vaping Use: Never used   Substance and Sexual Activity   • Alcohol use: Not Currently     Alcohol/week: 5.0 - 9.0 standard drinks     Types: 1 - 2 Cans of beer, 4 - 7 Shots of liquor per week     Comment: Drank everyday after the [place]   • Drug use: Never   • Sexual activity: Yes     Partners: Female     Birth control/protection: None        Review of Systems     Objective   Vital Signs:   Ht 177.8 cm (70\")   Wt 98.9 kg (218 lb)   BMI 31.28 kg/m²       Physical Exam  Constitutional:       Appearance: Normal appearance. Patient is well-developed and normal weight.   HENT:      Head: Normocephalic.      Right Ear: Hearing and external ear normal.      Left Ear: Hearing and external ear normal.      Nose: Nose normal.   Eyes:      Conjunctiva/sclera: Conjunctivae normal. "   Cardiovascular:      Rate and Rhythm: Normal rate.   Pulmonary:      Effort: Pulmonary effort is normal.      Breath sounds: No wheezing or rales.   Abdominal:      Palpations: Abdomen is soft.      Tenderness: There is no abdominal tenderness.   Musculoskeletal:      Cervical back: Normal range of motion.   Skin:     Findings: No rash.   Neurological:      Mental Status: Patient is alert and oriented to person, place, and time.   Psychiatric:         Mood and Affect: Mood and affect normal.         Judgment: Judgment normal.       Ortho Exam      LEFT SHOULDER Forward flexion 145. Abduction 145. External rotation 50. Internal rotation 50. Positive Cross body adduction. Supraspinatus strength 3/5. Infraspinatus Strength 3/5. Infrared subscap 3/5. Mildly Positive  Rodriguez. Mildly Positive  Neer. Negative Apprehension. Negative Lift off. (Positive Obriens. Sensation intact to light touch, median, radial, ulnar nerve. Positive AIN, PIN, ulnar nerve motor. Positive pulses. Positive Impingement signs. Good strength in triceps, biceps, deltoid, wrist extensors and wrist flexors.      Procedures      Imaging Results (Most Recent)     None           Result Review :           Assessment and Plan     Diagnoses and all orders for this visit:    1. Degenerative superior labral anterior-to-posterior (SLAP) tear of left shoulder (Primary)        Discussed the treatment plan with the patient. Discussed the risks and benefits of conservative treatments as injections, exercises and anti inflammatories.  Given HEP exercises.     Call or return if worsening symptoms.    Follow Up     PRN      Patient was given instructions and counseling regarding his condition or for health maintenance advice. Please see specific information pulled into the AVS if appropriate.     Scribed for Peace Wilkins MD by Елена Leach MA.  02/02/23   16:15 EST    I have personally performed the services described in this document as scribed by the  above individual and it is both accurate and complete. Peace Wilkins MD 02/03/23

## 2023-02-03 ENCOUNTER — OFFICE VISIT (OUTPATIENT)
Dept: NEUROLOGY | Facility: CLINIC | Age: 58
End: 2023-02-03
Payer: OTHER GOVERNMENT

## 2023-02-03 VITALS
OXYGEN SATURATION: 98 % | DIASTOLIC BLOOD PRESSURE: 85 MMHG | HEIGHT: 70 IN | HEART RATE: 70 BPM | BODY MASS INDEX: 30.92 KG/M2 | WEIGHT: 216 LBS | SYSTOLIC BLOOD PRESSURE: 150 MMHG

## 2023-02-03 DIAGNOSIS — G35 MULTIPLE SCLEROSIS: Primary | ICD-10-CM

## 2023-02-03 PROCEDURE — 99215 OFFICE O/P EST HI 40 MIN: CPT | Performed by: PSYCHIATRY & NEUROLOGY

## 2023-02-03 RX ORDER — DOXYCYCLINE HYCLATE 100 MG/1
CAPSULE ORAL
COMMUNITY
Start: 2023-01-17 | End: 2023-02-03

## 2023-02-03 NOTE — PROGRESS NOTES
This is a very pleasant 57-year-old gentleman with multiple sclerosis who recently started Ocrevus.  He received the first 2 infusions with no major incident.  He states that he had no major side effects.  He reports no new neurologic issues at today's visit.        He continues to struggle with some constipation as well as sexual dysfunction.  He is also struggling with some mood related issues but he states these have been better [facility] he also sees a psychologist and psychiatrist and he states this has been very helpful.        Overall no major new neurologic issues.    He was able to walk 25 feet in 6 seconds with the aid of a cane.        In summary this very pleasant 57-year-old gentleman who just started Ocrevus.  We will continue the medication for now.  For ongoing sexual function I discussed starting Viagra.  He is on an alpha-blocker so we would need to be careful given this can lower his blood pressure.  He understands these risks and would like to proceed.  I am also going to start him on ampyra to help with walking speed he has no history of seizures his last creatinine was normal.  I reviewed the risks and benefits of this medication with him at length.  He will follow-up with me in 3 months with repeat imaging at this time.  Of course if he has any problems in the interim I be happy to see him sooner.    Diagnoses and all orders for this visit:    1. Multiple sclerosis (HCC) (Primary)  -     MRI Brain Without Contrast; Future      I spent 45 minutes in patient care

## 2023-02-07 ENCOUNTER — OFFICE VISIT (OUTPATIENT)
Dept: OTOLARYNGOLOGY | Facility: CLINIC | Age: 58
End: 2023-02-07
Payer: OTHER GOVERNMENT

## 2023-02-07 ENCOUNTER — PATIENT ROUNDING (BHMG ONLY) (OUTPATIENT)
Dept: OTOLARYNGOLOGY | Facility: CLINIC | Age: 58
End: 2023-02-07
Payer: OTHER GOVERNMENT

## 2023-02-07 VITALS — TEMPERATURE: 97.9 F | HEIGHT: 70 IN | BODY MASS INDEX: 32.58 KG/M2 | WEIGHT: 227.6 LBS

## 2023-02-07 DIAGNOSIS — R26.89 IMBALANCE: ICD-10-CM

## 2023-02-07 DIAGNOSIS — D33.3 ACOUSTIC NEUROMA: Primary | ICD-10-CM

## 2023-02-07 DIAGNOSIS — H93.13 TINNITUS OF BOTH EARS: ICD-10-CM

## 2023-02-07 DIAGNOSIS — H90.3 SENSORINEURAL HEARING LOSS (SNHL) OF BOTH EARS: ICD-10-CM

## 2023-02-07 PROCEDURE — 99204 OFFICE O/P NEW MOD 45 MIN: CPT | Performed by: OTOLARYNGOLOGY

## 2023-02-07 RX ORDER — SILDENAFIL 25 MG/1
25 TABLET, FILM COATED ORAL DAILY PRN
Qty: 30 TABLET | Refills: 1 | Status: SHIPPED | OUTPATIENT
Start: 2023-02-07

## 2023-02-07 NOTE — PROGRESS NOTES
"Patient Name: Jai Dow   Visit Date: 02/07/2023   Patient ID: [id number]  Provider: Markus Alford MD    Sex: male  Location: [facility]   YOB: 1965  Location Address: [address] [place] [address], [address],?[place]?[address]    Primary Care Provider  Neena APRANISH  Location Phone: [phone]    Referring Provider: Yasmani Vela DO        Chief Complaint  Acoustic neuroma  (New patient )    Subjective    History of Present Illness  Jai Dow is a 57 y.o. male who presents to [facility] today as a consult from Yasmani eVla DO.    He presents to the clinic today for evaluation of recent incidental finding of 2 mm left-sided lesion along the internal auditory canal which the radiologist noted is possibly acoustic neuroma.  This was done in work-up for multiple sclerosis.  The patient denies any specific left-sided symptoms, but does have an extensive history of loud noise exposure in his line of work for the  and has had a number of explosive exposures.  He does have ringing in both ears which she has had for quite some time and is doing some therapy for it with one of the audiologist in [place].  He notes that he recently had a hearing test in December, but unfortunately did not have the records available for my review.  We discussed the imaging findings today in the clinic.  We discussed the nature of acoustic neuroma in general.    He also informs me that he has some difficulty with his balance.  Part of this is complicated by the fact that he has had bilateral knee replacements, as well as his recent MS diagnosis.  Denies any true vertigo symptoms.  He notes that \"sometimes my legs give out.\"    Past Medical History:   Diagnosis Date   • Cluster headache 2003    [employer]   • Difficulty walking 2018    Knee replacements   • Dizziness 1990 and 2003    Deployments to [place] and [place]   • Head injury 2001    In " the [employer] in the back of a track vehicle   • Headache, tension-type 2003    [employer]   • Hypertension 2006   • Memory loss 2001    Increasing, memory loss Family members names, co-workers, recent projects   • Migraine    • Migraine    • Multiple sclerosis (HCC) 12/22/2022   • PTSD (post-traumatic stress disorder)    • Sleep apnea     Stop using the machine/felt like I couldn’t breath  during nightmares   • Tinnitus     1990 and 2003   • Vision loss 2022    Blurred       Past Surgical History:   Procedure Laterality Date   • COLONOSCOPY  09/2015         Current Outpatient Medications:   •  amitriptyline (ELAVIL) 10 MG tablet, amitriptyline 10 mg oral tablet take 1 tablet by oral route 2 times a day   Active, Disp: , Rfl:   •  aspirin 81 MG EC tablet, Take 81 mg by mouth Daily., Disp: , Rfl:   •  atenolol (TENORMIN) 50 MG tablet, Take 50 mg by mouth Daily., Disp: , Rfl:   •  atorvastatin (LIPITOR) 10 MG tablet, Take 1 tablet by mouth Daily., Disp: , Rfl:   •  clobetasol (TEMOVATE) 0.05 % ointment, , Disp: , Rfl:   •  Fremanezumab-vfrm (Ajovy) 225 MG/1.5ML solution auto-injector, Inject 225 mg under the skin into the appropriate area as directed Every 28 (Twenty-Eight) Days., Disp: 4.5 mL, Rfl: 3  •  gabapentin (Neurontin) 300 MG capsule, Take 1 capsule by mouth Every Night. (Patient taking differently: Take 300 mg by mouth As Needed.), Disp: 90 capsule, Rfl: 0  •  hydroCHLOROthiazide (HYDRODIURIL) 25 MG tablet, hydrochlorothiazide 25 mg oral tablet take 1 tablet (25 mg) by oral route once daily   Active, Disp: , Rfl:   •  hydrocortisone 2.5 % cream, , Disp: , Rfl:   •  ibuprofen (ADVIL,MOTRIN) 800 MG tablet, ibuprofen 800 mg oral tablet take 1 tablet (800 mg) by oral route 3 times per day with food   Active, Disp: , Rfl:   •  lisinopril (PRINIVIL,ZESTRIL) 40 MG tablet, Take 1 tablet by mouth Daily., Disp: 30 tablet, Rfl: 0  •  mupirocin (BACTROBAN) 2 % ointment, Apply  topically to the appropriate area as directed 3  "(Three) Times a Day. apply topically to the affected area three times daily, Disp: , Rfl:   •  naproxen sodium (ANAPROX) 275 MG tablet, Take 275 mg by mouth 2 (Two) Times a Day With Meals., Disp: , Rfl:   •  Omega-3 Fatty Acids (fish oil) 1000 MG capsule capsule, Take 1 capsule by mouth Daily With Breakfast., Disp: , Rfl:   •  ondansetron (ZOFRAN) 4 MG tablet, Zofran 4 mg oral tablet take 1 tablet by oral route PRN for migraines   Active, Disp: , Rfl:   •  prazosin (MINIPRESS) 2 MG capsule, Take 2 mg by mouth Daily., Disp: , Rfl:   •  pregabalin (Lyrica) 75 MG capsule, Take 1 capsule by mouth 2 (Two) Times a Day., Disp: 60 capsule, Rfl: 0  •  Rimegepant Sulfate (Nurtec) 75 MG tablet dispersible tablet, Take 1 tablet by mouth Take As Directed for 6 doses., Disp: 6 tablet, Rfl: 0  •  sertraline (ZOLOFT) 100 MG tablet, 200 mg., Disp: , Rfl:     Current Facility-Administered Medications:   •  Fremanezumab-vfrm solution prefilled syringe 225 mg, 225 mg, Subcutaneous, Once, Oropilla, Leopoldo Ruth MD     Allergies   Allergen Reactions   • Amoxil [Amoxicillin] Angioedema       Family History   Problem Relation Age of Onset   • Dementia Mother    • Stroke Father    • Hypertension Father    • Osteoarthritis Father    • Stroke Brother    • Migraines Brother         Social History     Social History Narrative   • Not on file       Objective     Vital Signs:   Temp 97.9 °F (36.6 °C) (Tympanic)   Ht 177.8 cm (70\")   Wt 103 kg (227 lb 9.6 oz)   BMI 32.66 kg/m²       Physical Exam         Constitutional   Appearance  · : well developed, well-nourished, alert and in no acute distress, voice clear and strong    Head  Inspection  · : no deformities or lesions  Face  Inspection  · : No facial lesions; House-Brackmann I/VI bilaterally  Palpation  · : No TMJ crepitus nor  muscle tenderness bilaterally    Eyes  Vision  Visual Fields  · : Extraocular movements are intact. No spontaneous or gaze-induced " nystagmus.  Conjunctivae  · : clear  Sclerae  · : clear  Pupils and Irises  · : pupils equal, round, and reactive to light.     Ears, Nose, Mouth and Throat    Ears    External Ears  · : appearance within normal limits, no lesions present  Otoscopic Examination  · : Tympanic membrane appearance within normal limits bilaterally without perforations, well-aerated middle ears  Hearing  · : intact to conversational voice both ears  Tunning fork testing:     :    Nose    External Nose  · : appearance normal  Intranasal Exam  · : mucosa within normal limits, vestibules normal, no intranasal lesions present, septum midline, sinuses non tender to percussion  Oral Cavity    Oral Mucosa  · : oral mucosa normal without pallor or cyanosis  Lips  · : lip appearance normal  Teeth  · : Partially edentulous, present teeth in good repair  Gums  · : gums pink, non-swollen, no bleeding present  Tongue  · : tongue appearance normal; normal mobility  Palate  · : hard palate normal, soft palate appearance normal with symmetric mobility    Throat    Oropharynx  · : no inflammation or lesions present, tonsils within normal limits  Hypopharynx  · : appearance within normal limits, superior epiglottis within normal limits  Larynx  · : appearance within normal limits, vocal cords within normal limits, no lesions present    Neck  Inspection/Palpation  · : normal appearance, no masses or tenderness, trachea midline; thyroid size normal, nontender, no nodules or masses present on palpation    Respiratory  Respiratory Effort  · : breathing unlabored  Inspection of Chest  · : normal appearance, no retractions    Cardiovascular  Heart  · : regular rate and rhythm    Lymphatic  Neck  · : no lymphadenopathy present  Supraclavicular Nodes  · : no lymphadenopathy present  Preauricular Nodes  · : no lymphadenopathy present    Skin and Subcutaneous Tissue  General Inspection  · : Regarding face and neck - there are no rashes present, no lesions present,  and no areas of discoloration    Neurologic  Cranial Nerves  · : cranial nerves II-XII are grossly intact bilaterally  Gait and Station  · : normal gait, able to stand without diffculty    Psychiatric  Judgement and Insight  · : judgment and insight intact  Mood and Affect  · : mood normal, affect appropriate          Assessment and Plan    Diagnoses and all orders for this visit:    1. Acoustic neuroma (HCC) (Primary)    2. Sensorineural hearing loss (SNHL) of both ears    3. Tinnitus of both ears    4. Imbalance    Examination today reveals a normal ear exam.  I saw no evidence of fluid or infection.  He recently had an audiogram and I will review the results once it is [facility] to us [facility].  We had a lengthy discussion regarding the nature of acoustic neuroma, and given his complicated history of noise exposure, I will discuss this further once I review the audiogram results.  We did discuss obtaining annual audiograms and we will track this with imaging as he is due to have a number of MRIs from his neurologist due to his MS diagnosis.  I have made him an appointment in 6 months, and we will discuss the imaging once he has a better idea of how often he is going to be getting MRIs.  We did discuss treatment for this including CyberKnife or gamma knife, but at this point the lesion is so small and whether it is symptomatic remains to be seen.    Follow Up   No follow-ups on file.  Patient was given instructions and counseling regarding his condition or for health maintenance advice. Please see specific information pulled into the AVS if appropriate.

## 2023-02-07 NOTE — PROGRESS NOTES
February 7, 2023    Hello, may I speak with Jai Dow?    My name is Nicole    I am  with [facility]  [address]   [place] [address] [phone].    Before we get started may I verify your date of birth? 1965    I am calling to officially welcome you to our practice and ask about your recent visit. Is this a good time to talk? yes    Tell me about your visit with us. What things went well?  patient stated visit went well        We're always looking for ways to make our patients' experiences even better. Do you have recommendations on ways we may improve?  no    Overall were you satisfied with your first visit to our practice? yes       I appreciate you taking the time to speak with me today. Is there anything else I can do for you? no      Thank you, and have a great day.

## 2023-02-08 ENCOUNTER — TELEPHONE (OUTPATIENT)
Dept: OTOLARYNGOLOGY | Facility: CLINIC | Age: 58
End: 2023-02-08

## 2023-02-08 ENCOUNTER — SPECIALTY PHARMACY (OUTPATIENT)
Dept: ONCOLOGY | Facility: HOSPITAL | Age: 58
End: 2023-02-08
Payer: OTHER GOVERNMENT

## 2023-02-08 RX ORDER — DALFAMPRIDINE 10 MG/1
10 TABLET, FILM COATED, EXTENDED RELEASE ORAL 2 TIMES DAILY
Qty: 180 TABLET | Refills: 3 | Status: SHIPPED | OUTPATIENT
Start: 2023-02-08

## 2023-02-08 NOTE — TELEPHONE ENCOUNTER
Caller: Jai Dow    Relationship: Self    Best call back number: [phone]    How would you like to receive the form or medical records (pick-up, mail, fax):       If fax, what is the fax number: [facility] MEDICAL RECORDS [facility] [place] [phone]. [facility] HAS S[facility] FORM WITH [facility] TO RE[facility]ICAL RECORDS TO ANY PROVIDER. WE NEED OUR FORM TO BE FAXED TO THE [facility] TO OBTAIN THE AUDIO INFORMATION TO BE SENT TO [facility].

## 2023-02-22 ENCOUNTER — TELEPHONE (OUTPATIENT)
Dept: GASTROENTEROLOGY | Facility: CLINIC | Age: 58
End: 2023-02-22
Payer: OTHER GOVERNMENT

## 2023-03-06 NOTE — PROGRESS NOTES
Patient ID: Jai Dow is a 58 y.o. male is being seen for consultation today at the request of Yasmani Vela,  Acoustic Neuroma , MRI Brain W/WO, Cspine W/WO, Tspine W/WO  12/14/22, MRI Brain WO  9/14/22 at [facility].    Today, Jai Dow reports lower back pain with radiating pain, numbness, weakness and tingling bilateral legs.  Denies loss of bowel/bladder incontinence.  He reports use of ice/heat mainly heat with mild relief.  He current with pain management lower back right and left side epidural injections series of 4 with moderate relief.  He report throbbing headaches, visual disturbance(double vision, blurred), dizzy, nausea, numbness in bilateral arms.   He reports neck pain with radiating numbness and tingling down bilateral arms.    Subjective     The patient is here in regards to   Chief Complaint   Patient presents with   • Back Pain   • Numbness   • Tingling   • Headache   • Blurred Vision       History of Present Illness  Jai has been dealing with a lot including a diagnosis of MS that is likely responsible for a variety of neurological issues including leg kicks and patchy numbness and pain and fatigue.  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  He is being treated for PTSD and has undergone cognitive behavioral therapy.  He has had some dizziness and nausea and vertigo symptoms but they are not consistently on the left side.      While in the room and during my examination of the patient I wore a mask and eye protection.  I washed my hands before and after this patient encounter.  The patient was also wearing a mask.    The following portions of the patient's history were reviewed and updated as appropriate: allergies, current medications, past family history, past medical history, past social history, past surgical history and problem list.    Review of Systems   Constitutional:  Positive for activity change.   HENT: Negative for sinus pressure, sinus pain and trouble swallowing.    Eyes: Positive for photophobia, pain and visual disturbance.   Respiratory: Negative for chest tightness and shortness of breath.    Cardiovascular: Negative.    Gastrointestinal: Positive for constipation and nausea. Negative for vomiting.   Endocrine: Negative.    Genitourinary: Positive for urgency. Negative for difficulty urinating.   Musculoskeletal: Positive for back pain, gait problem, neck pain and neck stiffness.   Neurological: Positive for dizziness, weakness, light-headedness, numbness and headaches.   Psychiatric/Behavioral: Positive for confusion and sleep disturbance. The patient is nervous/anxious.         Past Medical History:   Diagnosis Date   • Cluster headache 2003    [facility]   • Difficulty walking 2018    Knee replacements   • Dizziness 1990 and 2003    Deployments to [place] and [place]   • Head injury 2001    In the [employer] in the back of a track vehicle   • Headache, tension-type 2003    [facility]   • Hypertension 2006   • Memory loss 2001    Increasing, memory loss Family members names, co-workers, recent projects   • Migraine    • Migraine    • Multiple sclerosis (HCC) 12/22/2022   • PTSD (post-traumatic stress disorder)    • Sleep apnea     Stop using the machine/felt like I couldn’t breath  during nightmares   • Tinnitus     1990 and 2003   • Vision loss 2022    Blurred       Allergies   Allergen Reactions   • Amoxil [Amoxicillin] Angioedema       Family History   Problem Relation Age of Onset   • Dementia Mother    • Stroke Father    • Hypertension Father    • Osteoarthritis Father    • Stroke Brother    • Migraines Brother        Social History     Socioeconomic History   • Marital status:    Tobacco Use   • Smoking status: Former     Packs/day: 0.50     Years: 10.00     Pack years: 5.00     Types: Cigarettes     Start date: 3/31/1980     Quit date: 6/10/1990     Years since quitting:  32.7   • Smokeless tobacco: Never   Vaping Use   • Vaping Use: Never used   Substance and Sexual Activity   • Alcohol use: Not Currently     Alcohol/week: 5.0 - 9.0 standard drinks     Types: 1 - 2 Cans of beer, 4 - 7 Shots of liquor per week     Comment: Drank everyday after the [place] War   • Drug use: Never   • Sexual activity: Yes     Partners: Female     Birth control/protection: None       Past Surgical History:   Procedure Laterality Date   • COLONOSCOPY  09/2015         Objective     Vitals:    03/08/23 1428   BP: 148/78   Pulse: 82   SpO2: 98%     Body mass index is 31.71 kg/m².    Physical Exam  Constitutional:       Appearance: Normal appearance.   HENT:      Head: Normocephalic and atraumatic.   Eyes:      Extraocular Movements: Extraocular movements intact.      Conjunctiva/sclera: Conjunctivae normal.      Pupils: Pupils are equal, round, and reactive to light.   Cardiovascular:      Rate and Rhythm: Normal rate and regular rhythm.      Pulses: Normal pulses.   Pulmonary:      Breath sounds: Normal breath sounds.   Abdominal:      Palpations: Abdomen is soft.   Musculoskeletal:         General: Normal range of motion.      Cervical back: Normal range of motion and neck supple.   Skin:     General: Skin is warm and dry.   Neurological:      Mental Status: He is alert and oriented to person, place, and time.      Cranial Nerves: Cranial nerves 2-12 are intact.      Motor: Motor function is intact. No weakness or atrophy.      Coordination: Coordination is intact. Romberg sign negative. Romberg Test normal.      Gait: Gait is intact. Gait normal.      Deep Tendon Reflexes: Reflexes are normal and symmetric.      Reflex Scores:       Tricep reflexes are 2+ on the right side and 2+ on the left side.       Bicep reflexes are 2+ on the right side and 2+ on the left side.       Brachioradialis reflexes are 2+ on the right side and 2+ on the left side.       Patellar reflexes are 2+ on the right side and 2+ on  the left side.       Achilles reflexes are 2+ on the right side and 2+ on the left side.  Psychiatric:         Speech: Speech normal.         Neurologic Exam     Mental Status   Oriented to person, place, and time.   Attention: normal. Concentration: normal.   Speech: speech is normal   Level of consciousness: alert    Cranial Nerves   Cranial nerves II through XII intact.     CN III, IV, VI   Pupils are equal, round, and reactive to light.    Motor Exam   Muscle bulk: normal  Overall muscle tone: normal    Strength   Strength 5/5 except as noted.     Sensory Exam   Light touch normal.     Gait, Coordination, and Reflexes     Gait  Gait: normal    Coordination   Romberg: negative    Reflexes   Reflexes 2+ except as noted.   Right brachioradialis: 2+  Left brachioradialis: 2+  Right biceps: 2+  Left biceps: 2+  Right triceps: 2+  Left triceps: 2+  Right patellar: 2+  Left patellar: 2+  Right achilles: 2+  Left achilles: 2+      Assessment & Plan   Independent Review of Radiographic Studies:      I personally reviewed the images from the following studies.    MR: MRI of the brain w/wo contrast was reviewed and shows T2 and flair signal changes consistent with multiple sclerosis.  There is a small 2 mm contrast-enhancing mass in the left IAC that is concerning for a small acoustic neuroma    Assessment/Plan: Most of Jai's neurological complaints can be attributed to multiple sclerosis.  I doubt that his very small 2 mm acoustic neuroma is causing significant enough compression of his 8th cranial nerves to be causing consistent nausea, dizziness and is unlikely to be responsible for his headaches as well.  I think we can just continue to follow this with an MRI next year and if that is stable, continue with every 3-year MRIs.    Medical Decision Making:      MRI of the brain with and without contrast in 1 year         Diagnoses and all orders for this visit:    1. Acoustic neuroma (HCC) (Primary)  -     MRI Brain  With & Without Contrast; Future             Patient Instructions/Recommendations:    Follow-up in 1 year with MRI      Arjun Dyer MD  03/08/23  15:03 EST

## 2023-03-08 ENCOUNTER — OFFICE VISIT (OUTPATIENT)
Dept: NEUROSURGERY | Facility: CLINIC | Age: 58
End: 2023-03-08
Payer: OTHER GOVERNMENT

## 2023-03-08 VITALS
HEART RATE: 82 BPM | DIASTOLIC BLOOD PRESSURE: 78 MMHG | SYSTOLIC BLOOD PRESSURE: 148 MMHG | WEIGHT: 221 LBS | BODY MASS INDEX: 31.71 KG/M2 | OXYGEN SATURATION: 98 %

## 2023-03-08 DIAGNOSIS — D33.3 ACOUSTIC NEUROMA: Primary | ICD-10-CM

## 2023-03-08 PROCEDURE — 99203 OFFICE O/P NEW LOW 30 MIN: CPT | Performed by: NEUROLOGICAL SURGERY

## 2023-03-10 ENCOUNTER — PATIENT ROUNDING (BHMG ONLY) (OUTPATIENT)
Dept: NEUROSURGERY | Facility: CLINIC | Age: 58
End: 2023-03-10
Payer: OTHER GOVERNMENT

## 2023-04-03 ENCOUNTER — HOSPITAL ENCOUNTER (OUTPATIENT)
Dept: MRI IMAGING | Facility: HOSPITAL | Age: 58
Discharge: HOME | End: 2023-04-03
Admitting: PSYCHIATRY & NEUROLOGY
Payer: OTHER GOVERNMENT

## 2023-04-03 DIAGNOSIS — G35 MULTIPLE SCLEROSIS: ICD-10-CM

## 2023-04-03 PROCEDURE — 70551 MRI BRAIN STEM W/O DYE: CPT

## 2023-04-20 ENCOUNTER — PREP FOR SURGERY (OUTPATIENT)
Dept: OTHER | Facility: HOSPITAL | Age: 58
End: 2023-04-20
Payer: OTHER GOVERNMENT

## 2023-04-21 ENCOUNTER — LAB (OUTPATIENT)
Dept: LAB | Facility: HOSPITAL | Age: 58
End: 2023-04-21
Payer: OTHER GOVERNMENT

## 2023-04-21 ENCOUNTER — OFFICE VISIT (OUTPATIENT)
Dept: NEUROLOGY | Facility: CLINIC | Age: 58
End: 2023-04-21
Payer: OTHER GOVERNMENT

## 2023-04-21 VITALS
WEIGHT: 216 LBS | SYSTOLIC BLOOD PRESSURE: 129 MMHG | HEART RATE: 73 BPM | BODY MASS INDEX: 30.92 KG/M2 | HEIGHT: 70 IN | DIASTOLIC BLOOD PRESSURE: 77 MMHG | OXYGEN SATURATION: 98 %

## 2023-04-21 DIAGNOSIS — G35 MULTIPLE SCLEROSIS: Primary | ICD-10-CM

## 2023-04-21 DIAGNOSIS — G35 MULTIPLE SCLEROSIS: ICD-10-CM

## 2023-04-21 LAB
BASOPHILS # BLD AUTO: 0.05 10*3/MM3 (ref 0–0.2)
BASOPHILS NFR BLD AUTO: 1.1 % (ref 0–1.5)
DEPRECATED RDW RBC AUTO: 39.6 FL (ref 37–54)
EOSINOPHIL # BLD AUTO: 0.12 10*3/MM3 (ref 0–0.4)
EOSINOPHIL NFR BLD AUTO: 2.7 % (ref 0.3–6.2)
ERYTHROCYTE [DISTWIDTH] IN BLOOD BY AUTOMATED COUNT: 12.9 % (ref 12.3–15.4)
HCT VFR BLD AUTO: 40.2 % (ref 37.5–51)
HGB BLD-MCNC: 13.7 G/DL (ref 13–17.7)
IMM GRANULOCYTES # BLD AUTO: 0.01 10*3/MM3 (ref 0–0.05)
IMM GRANULOCYTES NFR BLD AUTO: 0.2 % (ref 0–0.5)
LYMPHOCYTES # BLD AUTO: 1.2 10*3/MM3 (ref 0.7–3.1)
LYMPHOCYTES NFR BLD AUTO: 26.6 % (ref 19.6–45.3)
MCH RBC QN AUTO: 28.2 PG (ref 26.6–33)
MCHC RBC AUTO-ENTMCNC: 34.1 G/DL (ref 31.5–35.7)
MCV RBC AUTO: 82.9 FL (ref 79–97)
MONOCYTES # BLD AUTO: 0.76 10*3/MM3 (ref 0.1–0.9)
MONOCYTES NFR BLD AUTO: 16.9 % (ref 5–12)
NEUTROPHILS NFR BLD AUTO: 2.37 10*3/MM3 (ref 1.7–7)
NEUTROPHILS NFR BLD AUTO: 52.5 % (ref 42.7–76)
PLATELET # BLD AUTO: 207 10*3/MM3 (ref 140–450)
PMV BLD AUTO: 10.8 FL (ref 6–12)
RBC # BLD AUTO: 4.85 10*6/MM3 (ref 4.14–5.8)
WBC NRBC COR # BLD: 4.51 10*3/MM3 (ref 3.4–10.8)

## 2023-04-21 PROCEDURE — 85025 COMPLETE CBC W/AUTO DIFF WBC: CPT

## 2023-04-21 PROCEDURE — 36415 COLL VENOUS BLD VENIPUNCTURE: CPT

## 2023-04-21 PROCEDURE — 80053 COMPREHEN METABOLIC PANEL: CPT

## 2023-04-21 NOTE — PROGRESS NOTES
This is a very pleasant 58-year-old gentleman with multiple sclerosis currently on Ocrevus.  He is also on ampyra to help with walking speed.  He reports no new neurologic issues since I saw him last.  He states that the ampyra is significantly helped his walking speed and he feels like he is doing better.  He reports no opportunistic infections he states he is tolerating the medication well.  Overall he feels like he is improved since I saw him last.        There is no imaging to review at today's visit.        In summary this a very pleasant 58-year-old gentleman with multiple sclerosis currently on Ocrevus.  We will continue Ocrevus.  I am going to check routine blood work today.  He will continue ampyra.  We will plan on obtaining an MRI of the brain and cervical spinal cord before his next visit.  He will follow-up with me in 6 months or sooner if there is any problems in the interim.        I spent 30 minutes in patient care.

## 2023-04-22 LAB
ALBUMIN SERPL-MCNC: 4.4 G/DL (ref 3.5–5.2)
ALBUMIN/GLOB SERPL: 1.3 G/DL
ALP SERPL-CCNC: 47 U/L (ref 39–117)
ALT SERPL W P-5'-P-CCNC: 16 U/L (ref 1–41)
ANION GAP SERPL CALCULATED.3IONS-SCNC: 10 MMOL/L (ref 5–15)
AST SERPL-CCNC: 22 U/L (ref 1–40)
BILIRUB SERPL-MCNC: 0.3 MG/DL (ref 0–1.2)
BUN SERPL-MCNC: 9 MG/DL (ref 8–23)
BUN/CREAT SERPL: 8.6 (ref 7–25)
CALCIUM SPEC-SCNC: 9.3 MG/DL (ref 8.6–10.5)
CHLORIDE SERPL-SCNC: 101 MMOL/L (ref 98–107)
CO2 SERPL-SCNC: 27 MMOL/L (ref 22–29)
CREAT SERPL-MCNC: 1.05 MG/DL (ref 0.76–1.27)
EGFRCR SERPLBLD CKD-EPI 2021: 81.3 ML/MIN/1.73
GLOBULIN UR ELPH-MCNC: 3.3 GM/DL
GLUCOSE SERPL-MCNC: 83 MG/DL (ref 65–99)
POTASSIUM SERPL-SCNC: 4.6 MMOL/L (ref 3.5–5.2)
PROT SERPL-MCNC: 7.7 G/DL (ref 6–8.5)
SODIUM SERPL-SCNC: 138 MMOL/L (ref 136–145)

## 2023-05-24 RX ORDER — LISINOPRIL 40 MG/1
40 TABLET ORAL DAILY
Qty: 30 TABLET | Refills: 0 | Status: SHIPPED | OUTPATIENT
Start: 2023-05-24

## 2023-05-24 RX ORDER — HYDROCHLOROTHIAZIDE 25 MG/1
25 TABLET ORAL DAILY
Qty: 90 TABLET | OUTPATIENT
Start: 2023-05-24

## 2023-05-24 RX ORDER — HYDROCHLOROTHIAZIDE 25 MG/1
25 TABLET ORAL DAILY
Qty: 30 TABLET | Refills: 0 | Status: SHIPPED | OUTPATIENT
Start: 2023-05-24

## 2023-05-24 RX ORDER — LISINOPRIL 40 MG/1
40 TABLET ORAL DAILY
Qty: 90 TABLET | OUTPATIENT
Start: 2023-05-24

## 2023-05-24 NOTE — TELEPHONE ENCOUNTER
[facility]- NEEDING AN APPT FOR MEDICATION REFILLS, CAN BE PUT IN A PHYSICAL SPOT    OK FOR HUB TO SCHEDULE

## 2023-05-24 NOTE — TELEPHONE ENCOUNTER
Caller: HAM DAN (ON VERBAL BUT NOT FOR MEDICATION)    Relationship: Emergency Contact    Best call back number: [phone] (PATIENT'S NUMBER)    Requested Prescriptions:   Requested Prescriptions     Pending Prescriptions Disp Refills   • lisinopril (PRINIVIL,ZESTRIL) 40 MG tablet 30 tablet 0     Sig: Take 1 tablet by mouth Daily.   • hydroCHLOROthiazide (HYDRODIURIL) 25 MG tablet          Pharmacy where request should be sent: [id number] - [place], [place] - [address] AT [facility] & [address] - [phone]  - [phone]      Last office visit with prescribing clinician: 8/25/2022   Last telemedicine visit with prescribing clinician: Visit date not found   Next office visit with prescribing clinician: Visit date not found     Additional details provided by patient: WIFE STATES HE IS COMPLETELY OUT OF BOTH OF THESE MEDICATIONS. THEY WOULD LIKE A WEEK SUPPLY SENT TO [facility] WHILE THEY ARE WAITING FOR HIS PRESCRIPTION IN THE MAIL.     Does the patient have less than a 3 day supply:  [x] Yes  [] No    Would you like a call back once the refill request has been completed: [x] Yes [] No    If the office needs to give you a call back, can they leave a voicemail: [x] Yes [] No    Evelia Villareal Rep   05/24/23 11:09 EDT

## 2023-05-25 NOTE — TELEPHONE ENCOUNTER
Spoke to patient, [facility] and [facility] them out them out today. Patient states he does not need an appt at this time.

## 2023-07-24 ENCOUNTER — TELEPHONE (OUTPATIENT)
Dept: NEUROLOGY | Facility: CLINIC | Age: 58
End: 2023-07-24
Payer: OTHER GOVERNMENT

## 2023-07-24 NOTE — TELEPHONE ENCOUNTER
Caller: PAN    Relationship to Patient: [facility]    Phone Number: [phone]    Reason for Call: CALLING REGARDING THE 7-28-23 INFUSION APPT.  STATED PT HAS COMPLETED HIS INDUCTION THERAPY & NOW NEEDS A MAINTENANCE  THERAPY ORDER.    PLEASE CALL & ADVISE

## 2023-07-25 RX ORDER — DIPHENHYDRAMINE HYDROCHLORIDE 50 MG/ML
25 INJECTION INTRAMUSCULAR; INTRAVENOUS ONCE
Status: CANCELLED | OUTPATIENT
Start: 2023-07-25

## 2023-07-25 RX ORDER — SODIUM CHLORIDE 9 MG/ML
250 INJECTION, SOLUTION INTRAVENOUS ONCE
Status: CANCELLED | OUTPATIENT
Start: 2023-07-25

## 2023-07-25 RX ORDER — IBUPROFEN 400 MG/1
400 TABLET ORAL EVERY 6 HOURS PRN
OUTPATIENT
Start: 2023-07-25

## 2023-07-25 RX ORDER — FAMOTIDINE 10 MG/ML
20 INJECTION, SOLUTION INTRAVENOUS AS NEEDED
OUTPATIENT
Start: 2023-07-25

## 2023-07-25 RX ORDER — ACETAMINOPHEN 325 MG/1
650 TABLET ORAL ONCE
Status: CANCELLED | OUTPATIENT
Start: 2023-07-25

## 2023-07-25 RX ORDER — DIPHENHYDRAMINE HYDROCHLORIDE 50 MG/ML
50 INJECTION INTRAMUSCULAR; INTRAVENOUS AS NEEDED
OUTPATIENT
Start: 2023-07-25

## 2023-07-25 RX ORDER — MEPERIDINE HYDROCHLORIDE 50 MG/ML
25 INJECTION INTRAMUSCULAR; INTRAVENOUS; SUBCUTANEOUS
OUTPATIENT
Start: 2023-07-25

## 2023-07-25 RX ORDER — ACETAMINOPHEN 325 MG/1
650 TABLET ORAL EVERY 6 HOURS PRN
OUTPATIENT
Start: 2023-07-25

## 2023-07-27 ENCOUNTER — OFFICE VISIT (OUTPATIENT)
Dept: FAMILY MEDICINE CLINIC | Facility: CLINIC | Age: 58
End: 2023-07-27
Payer: OTHER GOVERNMENT

## 2023-07-27 ENCOUNTER — LAB (OUTPATIENT)
Dept: LAB | Facility: HOSPITAL | Age: 58
End: 2023-07-27
Payer: OTHER GOVERNMENT

## 2023-07-27 VITALS
WEIGHT: 226 LBS | OXYGEN SATURATION: 98 % | SYSTOLIC BLOOD PRESSURE: 140 MMHG | DIASTOLIC BLOOD PRESSURE: 77 MMHG | HEART RATE: 58 BPM | BODY MASS INDEX: 32.35 KG/M2 | HEIGHT: 70 IN

## 2023-07-27 DIAGNOSIS — E78.5 HYPERLIPIDEMIA, UNSPECIFIED HYPERLIPIDEMIA TYPE: ICD-10-CM

## 2023-07-27 DIAGNOSIS — G43.701 CHRONIC MIGRAINE WITHOUT AURA WITH STATUS MIGRAINOSUS, NOT INTRACTABLE: ICD-10-CM

## 2023-07-27 DIAGNOSIS — F32.A DEPRESSION, UNSPECIFIED DEPRESSION TYPE: ICD-10-CM

## 2023-07-27 DIAGNOSIS — Z12.5 SCREENING FOR PROSTATE CANCER: ICD-10-CM

## 2023-07-27 DIAGNOSIS — Z00.00 ANNUAL PHYSICAL EXAM: ICD-10-CM

## 2023-07-27 DIAGNOSIS — I10 PRIMARY HYPERTENSION: ICD-10-CM

## 2023-07-27 DIAGNOSIS — Z00.00 ANNUAL PHYSICAL EXAM: Primary | ICD-10-CM

## 2023-07-27 DIAGNOSIS — M79.2 NERVE PAIN: ICD-10-CM

## 2023-07-27 DIAGNOSIS — Z13.29 SCREENING FOR THYROID DISORDER: ICD-10-CM

## 2023-07-27 LAB
CHOLEST SERPL-MCNC: 147 MG/DL (ref 0–200)
HDLC SERPL-MCNC: 29 MG/DL (ref 40–60)
LDLC SERPL CALC-MCNC: 90 MG/DL (ref 0–100)
LDLC/HDLC SERPL: 2.99 {RATIO}
PSA SERPL-MCNC: 1.52 NG/ML (ref 0–4)
TRIGL SERPL-MCNC: 157 MG/DL (ref 0–150)
TSH SERPL DL<=0.05 MIU/L-ACNC: 0.98 UIU/ML (ref 0.27–4.2)
VLDLC SERPL-MCNC: 28 MG/DL (ref 5–40)

## 2023-07-27 PROCEDURE — 84443 ASSAY THYROID STIM HORMONE: CPT

## 2023-07-27 PROCEDURE — 80061 LIPID PANEL: CPT

## 2023-07-27 PROCEDURE — 36415 COLL VENOUS BLD VENIPUNCTURE: CPT

## 2023-07-27 PROCEDURE — G0103 PSA SCREENING: HCPCS

## 2023-07-27 PROCEDURE — 99396 PREV VISIT EST AGE 40-64: CPT | Performed by: NURSE PRACTITIONER

## 2023-07-27 NOTE — PROGRESS NOTES
Chief Complaint  Annual Exam, Hyperlipidemia, Migraine, Nerve Pain, Depression, and Hypertension    Subjective            Jai Dow presents to [facility]  History of Present Illness  Pt is an annual CPE for depression, HTN, migraines, HLD, and nerve pain. Pt is no longer taking Gabapentin. Pt has been changed to Lyrica from Neurontin by Dr. Vela, Neurology and he is managing that medication.   PT was dx with MS by Dr. Vela. No issues or concerns at this time.    [facility]. Pt is in PT and [facility] the [facility]. [facility]ations managed through [facility]. Pt f/u every 6 mo for maintenance.     Pt is due labs.     .Colon screening: scheduled 23 with Dr. Bazzi.    Pt is due shingles and covid vaccines. Pt will continue to get at [facility].       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    Headache, tension-type     [facility]    Hypertension 2006    Memory loss     Increasing, memory loss Family members names, co-workers, recent projects    Migraine     Migraine     Multiple sclerosis 2022    PTSD (post-traumatic stress disorder)     Sleep apnea     Stop using the machine/felt like I couldn’t breath  during nightmares    Tinnitus      and     Vision loss     Blurred       Allergies   Allergen Reactions    Amoxil [Amoxicillin] Angioedema        Past Surgical History:   Procedure Laterality Date    COLONOSCOPY  2015        Social History     Tobacco Use    Smoking status: Former     Packs/day: 0.50     Years: 10.00     Pack years: 5.00     Types: Cigarettes     Start date: 3/31/1980     Quit date: 6/10/1990     Years since quittin.1    Smokeless tobacco: Never   Substance Use Topics    Alcohol use: Not Currently      Alcohol/week: 5.0 - 9.0 standard drinks     Types: 1 - 2 Cans of beer, 4 - 7 Shots of liquor per week     Comment: Drank everyday after the [place] War       Family History   Problem Relation Age of Onset    Dementia Mother     Stroke Father     Hypertension Father     Osteoarthritis Father     Stroke Brother     Migraines Brother         Current Outpatient Medications on File Prior to Visit   Medication Sig    amitriptyline (ELAVIL) 25 MG tablet Take 2 tablets by mouth Every Night.    aspirin 81 MG EC tablet Take 1 tablet by mouth Daily.    atenolol (TENORMIN) 50 MG tablet Take 1 tablet by mouth Daily.    atorvastatin (LIPITOR) 10 MG tablet Take 1 tablet by mouth Daily.    clobetasol (TEMOVATE) 0.05 % ointment     cyclobenzaprine (FLEXERIL) 10 MG tablet Take 1 tablet by mouth 3 (Three) Times a Day As Needed.    Dalfampridine ER 10 MG tablet sustained-release 12 hour Take 10 mg by mouth 2 (Two) Times a Day.    Emollient (AQUAPHOR OINTMENT BODY EX) Apply  topically.    Fremanezumab-vfrm (Ajovy) 225 MG/1.5ML solution auto-injector Inject 225 mg under the skin into the appropriate area as directed Every 28 (Twenty-Eight) Days.    hydroCHLOROthiazide (HYDRODIURIL) 25 MG tablet Take 1 tablet by mouth Daily.    hydrocortisone 2.5 % cream     ibuprofen (ADVIL,MOTRIN) 800 MG tablet ibuprofen 800 mg oral tablet take 1 tablet (800 mg) by oral route 3 times per day with food   Active    lisinopril (PRINIVIL,ZESTRIL) 40 MG tablet Take 1 tablet by mouth Daily.    mupirocin (BACTROBAN) 2 % ointment Apply  topically to the appropriate area as directed 3 (Three) Times a Day. apply topically to the affected area three times daily    naproxen sodium (ANAPROX) 275 MG tablet Take 1 tablet by mouth 2 (Two) Times a Day With Meals.    Omega-3 Fatty Acids (fish oil) 1000 MG capsule capsule Take 1 capsule by mouth Daily With Breakfast.    ondansetron (ZOFRAN) 4 MG tablet Zofran 4 mg oral tablet take 1 tablet by oral route PRN for migraines    "Active    oxybutynin XL (DITROPAN XL) 15 MG 24 hr tablet Take 1 tablet by mouth Daily.    prazosin (MINIPRESS) 2 MG capsule Take 1 capsule by mouth Daily.    pregabalin (Lyrica) 75 MG capsule Take 1 capsule by mouth 2 (Two) Times a Day.    Rimegepant Sulfate (Nurtec) 75 MG tablet dispersible tablet Take 1 tablet by mouth Take As Directed.    sertraline (ZOLOFT) 100 MG tablet 2 tablets.    sildenafil (Viagra) 25 MG tablet Take 1 tablet by mouth Daily As Needed for Erectile Dysfunction.    urea (CARMOL) 20 % cream Apply 1 application  topically to the appropriate area as directed As Needed for Dry Skin.    ZOLMitriptan (ZOMIG-ZMT) 2.5 MG disintegrating tablet Place 1 tablet on the tongue Take As Directed. TAKE ONE TAB AS NEEDED EVERY TWO HOURS FOR ADDITIONAL RELIEF    [DISCONTINUED] gabapentin (Neurontin) 300 MG capsule Take 1 capsule by mouth Every Night.     Current Facility-Administered Medications on File Prior to Visit   Medication    Fremanezumab-vfrm solution prefilled syringe 225 mg       Health Maintenance Due   Topic Date Due    ANNUAL PHYSICAL  Never done    LIPID PANEL  Never done       Objective     /77   Pulse 58   Ht 177.8 cm (70\")   Wt 103 kg (226 lb)   SpO2 98%   BMI 32.43 kg/m²       Physical Exam  Constitutional:       General: He is not in acute distress.     Appearance: Normal appearance. He is not ill-appearing.   HENT:      Head: Normocephalic and atraumatic.      Right Ear: Tympanic membrane, ear canal and external ear normal.      Left Ear: Tympanic membrane, ear canal and external ear normal.      Nose: Nose normal.   Cardiovascular:      Rate and Rhythm: Normal rate and regular rhythm.      Heart sounds: Normal heart sounds. No murmur heard.  Pulmonary:      Effort: Pulmonary effort is normal. No respiratory distress.      Breath sounds: Normal breath sounds.   Chest:      Chest wall: No tenderness.   Abdominal:      General: There is no distension.      Palpations: There is no " mass.      Tenderness: There is no abdominal tenderness. There is no guarding.   Musculoskeletal:         General: No swelling or tenderness. Normal range of motion.      Cervical back: Normal range of motion and neck supple.      Comments: Ambulates with a cane   Skin:     General: Skin is warm and dry.      Findings: No rash.   Neurological:      General: No focal deficit present.      Mental Status: He is alert and oriented to person, place, and time. Mental status is at baseline.      Gait: Gait normal.   Psychiatric:         Mood and Affect: Mood normal.         Behavior: Behavior normal.         Thought Content: Thought content normal.         Judgment: Judgment normal.         Result Review :                           Assessment and Plan        Diagnoses and all orders for this visit:    1. Annual physical exam (Primary)  -     Lipid panel; Future  -     TSH; Future  -     PSA SCREENING; Future    2. Hyperlipidemia, unspecified hyperlipidemia type  Comments:  stable on lipitor 10mg, continue  Orders:  -     Lipid panel; Future    3. Primary hypertension  Comments:  stable on tenormin 50mg and HCTZ 25mg and lisnopril 40mg, and minipress 2mg continue    4. Depression, unspecified depression type  Comments:  stable on zoloft 100gm, continue    5. Screening for thyroid disorder  -     TSH; Future    6. Screening for prostate cancer  -     PSA SCREENING; Future    7. Chronic migraine without aura with status migrainosus, not intractable  Comments:  stable on ajovy, and nurtec 75mg prn, continue    8. Nerve pain  Comments:  stable  on Lyrica 75mg, continue, continue f/u with Neurology for mgmt.      Preventative Counseling  Healthy diet  Daily exercise  Get adequate sleep        Follow Up     Return in about 6 months (around 1/27/2024).    Patient was given instructions and counseling regarding his condition or for health maintenance advice. Please see specific information pulled into the AVS if appropriate.      Jai Dow  reports that he quit smoking about 33 years ago. His smoking use included cigarettes. He started smoking about 43 years ago. He has a 5.00 pack-year smoking history. He has never used smokeless tobacco..

## 2023-07-28 ENCOUNTER — HOSPITAL ENCOUNTER (OUTPATIENT)
Dept: INFUSION THERAPY | Facility: HOSPITAL | Age: 58
Discharge: HOME | End: 2023-07-28
Payer: OTHER GOVERNMENT

## 2023-07-28 VITALS
HEART RATE: 61 BPM | TEMPERATURE: 98.1 F | SYSTOLIC BLOOD PRESSURE: 127 MMHG | RESPIRATION RATE: 20 BRPM | DIASTOLIC BLOOD PRESSURE: 87 MMHG | BODY MASS INDEX: 32.45 KG/M2 | OXYGEN SATURATION: 95 % | WEIGHT: 226.63 LBS | HEIGHT: 70 IN

## 2023-07-28 DIAGNOSIS — G35 MULTIPLE SCLEROSIS: Primary | ICD-10-CM

## 2023-07-28 PROCEDURE — 25010000002 METHYLPREDNISOLONE PER 125 MG: Performed by: PSYCHIATRY & NEUROLOGY

## 2023-07-28 PROCEDURE — 96413 CHEMO IV INFUSION 1 HR: CPT

## 2023-07-28 PROCEDURE — 96365 THER/PROPH/DIAG IV INF INIT: CPT

## 2023-07-28 PROCEDURE — 25010000002 OCRELIZUMAB 300 MG/10ML SOLUTION 300 MG VIAL: Performed by: PSYCHIATRY & NEUROLOGY

## 2023-07-28 PROCEDURE — 96366 THER/PROPH/DIAG IV INF ADDON: CPT

## 2023-07-28 PROCEDURE — 25010000002 DIPHENHYDRAMINE PER 50 MG: Performed by: PSYCHIATRY & NEUROLOGY

## 2023-07-28 PROCEDURE — 96375 TX/PRO/DX INJ NEW DRUG ADDON: CPT

## 2023-07-28 PROCEDURE — 96415 CHEMO IV INFUSION ADDL HR: CPT

## 2023-07-28 RX ORDER — DIPHENHYDRAMINE HYDROCHLORIDE 50 MG/ML
50 INJECTION INTRAMUSCULAR; INTRAVENOUS AS NEEDED
OUTPATIENT
Start: 2024-01-26

## 2023-07-28 RX ORDER — SODIUM CHLORIDE 9 MG/ML
250 INJECTION, SOLUTION INTRAVENOUS ONCE
OUTPATIENT
Start: 2024-01-26

## 2023-07-28 RX ORDER — DIPHENHYDRAMINE HYDROCHLORIDE 50 MG/ML
25 INJECTION INTRAMUSCULAR; INTRAVENOUS ONCE
OUTPATIENT
Start: 2024-01-26

## 2023-07-28 RX ORDER — ACETAMINOPHEN 325 MG/1
650 TABLET ORAL ONCE
Status: COMPLETED | OUTPATIENT
Start: 2023-07-28 | End: 2023-07-28

## 2023-07-28 RX ORDER — METHYLPREDNISOLONE SODIUM SUCCINATE 125 MG/2ML
100 INJECTION, POWDER, LYOPHILIZED, FOR SOLUTION INTRAMUSCULAR; INTRAVENOUS ONCE
OUTPATIENT
Start: 2024-01-26

## 2023-07-28 RX ORDER — ACETAMINOPHEN 325 MG/1
650 TABLET ORAL ONCE
OUTPATIENT
Start: 2024-01-26

## 2023-07-28 RX ORDER — ACETAMINOPHEN 325 MG/1
650 TABLET ORAL EVERY 6 HOURS PRN
OUTPATIENT
Start: 2024-01-26

## 2023-07-28 RX ORDER — IBUPROFEN 400 MG/1
400 TABLET ORAL EVERY 6 HOURS PRN
OUTPATIENT
Start: 2024-01-26

## 2023-07-28 RX ORDER — FAMOTIDINE 10 MG/ML
20 INJECTION, SOLUTION INTRAVENOUS AS NEEDED
OUTPATIENT
Start: 2024-01-26

## 2023-07-28 RX ORDER — SODIUM CHLORIDE 9 MG/ML
250 INJECTION, SOLUTION INTRAVENOUS ONCE
Status: DISCONTINUED | OUTPATIENT
Start: 2023-07-28 | End: 2023-07-30 | Stop reason: HOSPADM

## 2023-07-28 RX ORDER — METHYLPREDNISOLONE SODIUM SUCCINATE 125 MG/2ML
100 INJECTION, POWDER, LYOPHILIZED, FOR SOLUTION INTRAMUSCULAR; INTRAVENOUS ONCE
Status: COMPLETED | OUTPATIENT
Start: 2023-07-28 | End: 2023-07-28

## 2023-07-28 RX ORDER — DIPHENHYDRAMINE HYDROCHLORIDE 50 MG/ML
25 INJECTION INTRAMUSCULAR; INTRAVENOUS ONCE
Status: COMPLETED | OUTPATIENT
Start: 2023-07-28 | End: 2023-07-28

## 2023-07-28 RX ORDER — MEPERIDINE HYDROCHLORIDE 50 MG/ML
25 INJECTION INTRAMUSCULAR; INTRAVENOUS; SUBCUTANEOUS
OUTPATIENT
Start: 2024-01-26

## 2023-07-28 RX ADMIN — METHYLPREDNISOLONE SODIUM SUCCINATE 100 MG: 125 INJECTION INTRAMUSCULAR; INTRAVENOUS at 10:47

## 2023-07-28 RX ADMIN — OCRELIZUMAB 600 MG: 300 INJECTION INTRAVENOUS at 10:52

## 2023-07-28 RX ADMIN — ACETAMINOPHEN 650 MG: 325 TABLET ORAL at 10:47

## 2023-07-28 RX ADMIN — DIPHENHYDRAMINE HYDROCHLORIDE 25 MG: 50 INJECTION, SOLUTION INTRAMUSCULAR; INTRAVENOUS at 10:47

## 2023-07-28 NOTE — PRE-PROCEDURE INSTRUCTIONS
"Instructed on date and arrival time of 0930. Come to entrance \"C\". Must have  over age 18 to drive home.  May have two visitors; however, children under 12 must stay in waiting room.  Discussed clear liquid diet (no red or purple) and bowel prep.  May take medications as usual except for blood thinners, diabetic medications, and weight loss medications.  Bring list of medications.  Verbalized understanding of instructions given.  Phone number given for questions or concerns.  "

## 2023-08-03 ENCOUNTER — TELEPHONE (OUTPATIENT)
Dept: NEUROLOGY | Facility: CLINIC | Age: 58
End: 2023-08-03

## 2023-08-03 NOTE — TELEPHONE ENCOUNTER
Caller: MT  Relationship to Patient: SELF  Phone Number: [phone]        Reason for Call: PT STATES THAT THE PHARMACY MADE HIM AWARE THAT HIS AJOVY NEEDS A PRIOR AUTH. HE WAS DUE FOR INJECTION ON 8-1-23

## 2023-08-04 ENCOUNTER — DOCUMENTATION (OUTPATIENT)
Dept: ONCOLOGY | Facility: HOSPITAL | Age: 58
End: 2023-08-04
Payer: OTHER GOVERNMENT

## 2023-08-04 NOTE — TELEPHONE ENCOUNTER
Patient called back following up on status of pre authorization. Wanting to speak with Amy or Kate. We spoke with Amy yesterday and he is working on pre auth.

## 2023-08-07 ENCOUNTER — ANESTHESIA EVENT (OUTPATIENT)
Dept: GASTROENTEROLOGY | Facility: HOSPITAL | Age: 58
End: 2023-08-07
Payer: OTHER GOVERNMENT

## 2023-08-07 ENCOUNTER — HOSPITAL ENCOUNTER (OUTPATIENT)
Facility: HOSPITAL | Age: 58
Setting detail: HOSPITAL OUTPATIENT SURGERY
Discharge: HOME | End: 2023-08-07
Attending: INTERNAL MEDICINE | Admitting: INTERNAL MEDICINE
Payer: OTHER GOVERNMENT

## 2023-08-07 ENCOUNTER — ANESTHESIA (OUTPATIENT)
Dept: GASTROENTEROLOGY | Facility: HOSPITAL | Age: 58
End: 2023-08-07
Payer: OTHER GOVERNMENT

## 2023-08-07 VITALS
HEIGHT: 70 IN | WEIGHT: 221.78 LBS | RESPIRATION RATE: 12 BRPM | DIASTOLIC BLOOD PRESSURE: 72 MMHG | TEMPERATURE: 98.2 F | OXYGEN SATURATION: 96 % | SYSTOLIC BLOOD PRESSURE: 121 MMHG | BODY MASS INDEX: 31.75 KG/M2 | HEART RATE: 68 BPM

## 2023-08-07 PROCEDURE — 45378 DIAGNOSTIC COLONOSCOPY: CPT | Performed by: INTERNAL MEDICINE

## 2023-08-07 PROCEDURE — 25010000002 PROPOFOL 10 MG/ML EMULSION: Performed by: NURSE ANESTHETIST, CERTIFIED REGISTERED

## 2023-08-07 RX ORDER — PROPOFOL 10 MG/ML
VIAL (ML) INTRAVENOUS AS NEEDED
Status: DISCONTINUED | OUTPATIENT
Start: 2023-08-07 | End: 2023-08-07 | Stop reason: SURG

## 2023-08-07 RX ORDER — LIDOCAINE HYDROCHLORIDE 20 MG/ML
INJECTION, SOLUTION EPIDURAL; INFILTRATION; INTRACAUDAL; PERINEURAL AS NEEDED
Status: DISCONTINUED | OUTPATIENT
Start: 2023-08-07 | End: 2023-08-07 | Stop reason: SURG

## 2023-08-07 RX ORDER — SODIUM CHLORIDE, SODIUM LACTATE, POTASSIUM CHLORIDE, CALCIUM CHLORIDE 600; 310; 30; 20 MG/100ML; MG/100ML; MG/100ML; MG/100ML
30 INJECTION, SOLUTION INTRAVENOUS CONTINUOUS
Status: DISCONTINUED | OUTPATIENT
Start: 2023-08-07 | End: 2023-08-07 | Stop reason: HOSPADM

## 2023-08-07 RX ADMIN — PROPOFOL 80 MG: 10 INJECTION, EMULSION INTRAVENOUS at 11:24

## 2023-08-07 RX ADMIN — PROPOFOL 175 MCG/KG/MIN: 10 INJECTION, EMULSION INTRAVENOUS at 11:24

## 2023-08-07 RX ADMIN — SODIUM CHLORIDE, POTASSIUM CHLORIDE, SODIUM LACTATE AND CALCIUM CHLORIDE: 600; 310; 30; 20 INJECTION, SOLUTION INTRAVENOUS at 11:21

## 2023-08-07 RX ADMIN — LIDOCAINE HYDROCHLORIDE 100 MG: 20 INJECTION, SOLUTION EPIDURAL; INFILTRATION; INTRACAUDAL; PERINEURAL at 11:24

## 2023-08-07 NOTE — ANESTHESIA PREPROCEDURE EVALUATION
Anesthesia Evaluation     Patient summary reviewed and Nursing notes reviewed   no history of anesthetic complications:   NPO Solid Status: > 8 hours  NPO Liquid Status: > 2 hours           Airway   Mallampati: II  TM distance: >3 FB  Neck ROM: full  No difficulty expected and Large neck circumference  Dental      Pulmonary - normal exam    breath sounds clear to auscultation  (+) ,sleep apnea  Cardiovascular - normal exam  Exercise tolerance: good (4-7 METS)    Rhythm: regular  Rate: normal    (+) hypertension      Neuro/Psych- negative ROS  GI/Hepatic/Renal/Endo - negative ROS     Musculoskeletal (-) negative ROS    Abdominal    Substance History - negative use     OB/GYN negative ob/gyn ROS         Other - negative ROS       ROS/Med Hx Other: PAT Nursing Notes unavailable.                 Anesthesia Plan    ASA 2     general     (Total IV Anesthesia    Patient understands anesthesia not responsible for dental damage.  )  intravenous induction     Anesthetic plan, risks, benefits, and alternatives have been provided, discussed and informed consent has been obtained with: patient.  Pre-procedure education provided  Plan discussed with CRNA.    CODE STATUS:

## 2023-08-07 NOTE — H&P
Pre Procedure History & Physical    Chief Complaint:   Colon cancer screening    Subjective     HPI:   Colon cancer screening    Past Medical History:   Past Medical History:   Diagnosis Date    Cluster headache 2003    [employer]    Difficulty walking 2018    Knee replacements    Dizziness 1990 and 2003    Deployments to [place] and [place]    Head injury 2001    In the [employer] in the back of a track vehicle    Headache, tension-type 2003    [employer]    Hypertension 2006    Memory loss 2001    Increasing, memory loss Family members names, co-workers, recent projects    Migraine     Migraine     Multiple sclerosis 12/22/2022    PTSD (post-traumatic stress disorder)     Sleep apnea     Stop using the machine/felt like I couldn't breath  during nightmares    Tinnitus     1990 and 2003    Vision loss 2022    Blurred       Past Surgical History:  Past Surgical History:   Procedure Laterality Date    COLONOSCOPY  09/2015    KNEE ACL RECONSTRUCTION      MENISCECTOMY      REPLACEMENT TOTAL KNEE Bilateral        Family History:  Family History   Problem Relation Age of Onset    Dementia Mother     Stroke Father     Hypertension Father     Osteoarthritis Father     Stroke Brother     Migraines Brother        Social History:   reports that he quit smoking about 33 years ago. His smoking use included cigarettes. He started smoking about 43 years ago. He has a 5.00 pack-year smoking history. He has never used smokeless tobacco. He reports that he does not currently use alcohol after a past usage of about 5.0 - 9.0 standard drinks per week. He reports that he does not use drugs.    Medications:   Facility-Administered Medications Prior to Admission   Medication Dose Route Frequency Provider Last Rate Last Admin    Fremanezumab-vfrm solution prefilled syringe 225 mg  225 mg Subcutaneous Once OropillLeopoldo odell MD         Medications Prior to Admission   Medication Sig Dispense Refill Last Dose    amitriptyline (ELAVIL) 25 MG tablet Take 2  tablets by mouth Every Night.   8/6/2023    aspirin 81 MG EC tablet Take 1 tablet by mouth Daily.   Past Week    atenolol (TENORMIN) 50 MG tablet Take 1 tablet by mouth Daily.   8/6/2023    atorvastatin (LIPITOR) 10 MG tablet Take 1 tablet by mouth Daily.   8/6/2023    clobetasol (TEMOVATE) 0.05 % ointment    Past Week    cyclobenzaprine (FLEXERIL) 10 MG tablet Take 1 tablet by mouth 3 (Three) Times a Day As Needed.   Past Week    Dalfampridine ER 10 MG tablet sustained-release 12 hour Take 10 mg by mouth 2 (Two) Times a Day. 180 tablet 3 8/6/2023    Emollient (AQUAPHOR OINTMENT BODY EX) Apply  topically.   Past Week    Fremanezumab-vfrm (Ajovy) 225 MG/1.5ML solution auto-injector Inject 225 mg under the skin into the appropriate area as directed Every 28 (Twenty-Eight) Days. 4.5 mL 3 Past Month    hydroCHLOROthiazide (HYDRODIURIL) 25 MG tablet Take 1 tablet by mouth Daily. 30 tablet 0 8/7/2023    hydrocortisone 2.5 % cream    Past Week    ibuprofen (ADVIL,MOTRIN) 800 MG tablet ibuprofen 800 mg oral tablet take 1 tablet (800 mg) by oral route 3 times per day with food   Active   Past Week    lisinopril (PRINIVIL,ZESTRIL) 40 MG tablet Take 1 tablet by mouth Daily. 30 tablet 0 8/7/2023    mupirocin (BACTROBAN) 2 % ointment Apply  topically to the appropriate area as directed 3 (Three) Times a Day. apply topically to the affected area three times daily   Past Week    naproxen sodium (ANAPROX) 275 MG tablet Take 1 tablet by mouth 2 (Two) Times a Day With Meals.   Past Week    Omega-3 Fatty Acids (fish oil) 1000 MG capsule capsule Take 1 capsule by mouth Daily With Breakfast.   Past Week    ondansetron (ZOFRAN) 4 MG tablet Zofran 4 mg oral tablet take 1 tablet by oral route PRN for migraines   Active   Past Week    oxybutynin XL (DITROPAN XL) 15 MG 24 hr tablet Take 1 tablet by mouth Daily. 30 tablet 3 8/6/2023    prazosin (MINIPRESS) 2 MG capsule Take 1 capsule by mouth Daily.   8/6/2023    pregabalin (Lyrica) 75 MG  "capsule Take 1 capsule by mouth 2 (Two) Times a Day. 180 capsule 1 8/6/2023    Rimegepant Sulfate (Nurtec) 75 MG tablet dispersible tablet Take 1 tablet by mouth Take As Directed.   Past Week    sertraline (ZOLOFT) 100 MG tablet 2 tablets.   8/7/2023    urea (CARMOL) 20 % cream Apply 1 application  topically to the appropriate area as directed As Needed for Dry Skin.   Past Week    sildenafil (Viagra) 25 MG tablet Take 1 tablet by mouth Daily As Needed for Erectile Dysfunction. 30 tablet 1 More than a month    ZOLMitriptan (ZOMIG-ZMT) 2.5 MG disintegrating tablet Place 1 tablet on the tongue Take As Directed. TAKE ONE TAB AS NEEDED EVERY TWO HOURS FOR ADDITIONAL RELIEF          Allergies:  Amoxil [amoxicillin]        Objective     Blood pressure 136/89, pulse 61, temperature 97.4 øF (36.3 øC), temperature source Temporal, resp. rate 20, height 177.8 cm (70\"), weight 101 kg (221 lb 12.5 oz), SpO2 96 %.    Physical Exam   Constitutional: Pt is oriented to person, place, and time and well-developed, well-nourished, and in no distress.   Mouth/Throat: Oropharynx is clear and moist.   Neck: Normal range of motion.   Cardiovascular: Normal rate, regular rhythm and normal heart sounds.    Pulmonary/Chest: Effort normal and breath sounds normal.   Abdominal: Soft. Nontender  Skin: Skin is warm and dry.   Psychiatric: Mood, memory, affect and judgment normal.     Assessment & Plan     Diagnosis:  Screening    Anticipated Surgical Procedure:  Colonoscopy    The risks, benefits, and alternatives of this procedure have been discussed with the patient or the responsible party- the patient understands and agrees to proceed.            "

## 2023-08-07 NOTE — ANESTHESIA POSTPROCEDURE EVALUATION
Patient: Jai Dow    Procedure Summary       Date: 08/07/23 Room / Location: formerly [facility] ENDOSCOPY 4 / formerly [facility] ENDOSCOPY    Anesthesia Start: 1121 Anesthesia Stop: 1149    Procedure: COLONOSCOPY Diagnosis:       Colon cancer screening      (Colon cancer screening [Z12.11])    Surgeons: Alexis Bazzi MD Provider: Augie Hicks MD    Anesthesia Type: general ASA Status: 2            Anesthesia Type: general    Vitals  Vitals Value Taken Time   /72 08/07/23 1202   Temp 36.8 øC (98.2 øF) 08/07/23 1202   Pulse 49 08/07/23 1204   Resp 12 08/07/23 1202   SpO2 100 % 08/07/23 1204   Vitals shown include unvalidated device data.        Post Anesthesia Care and Evaluation    Patient location during evaluation: bedside  Patient participation: complete - patient participated  Level of consciousness: awake  Pain management: adequate    Airway patency: patent  PONV Status: none  Cardiovascular status: acceptable  Respiratory status: acceptable  Hydration status: acceptable    Comments: An Anesthesiologist personally participated in the most demanding procedures (including induction and emergence if applicable) in the anesthesia plan, monitored the course of anesthesia administration at frequent intervals and remained physically present and available for immediate diagnosis and treatment of emergencies.

## 2023-08-08 ENCOUNTER — OFFICE VISIT (OUTPATIENT)
Dept: OTOLARYNGOLOGY | Facility: CLINIC | Age: 58
End: 2023-08-08
Payer: OTHER GOVERNMENT

## 2023-08-08 ENCOUNTER — TELEPHONE (OUTPATIENT)
Dept: NEUROLOGY | Facility: CLINIC | Age: 58
End: 2023-08-08

## 2023-08-08 VITALS — WEIGHT: 225.4 LBS | TEMPERATURE: 97.9 F | HEIGHT: 70 IN | BODY MASS INDEX: 32.27 KG/M2

## 2023-08-08 DIAGNOSIS — D33.3 ACOUSTIC NEUROMA: Primary | ICD-10-CM

## 2023-08-08 DIAGNOSIS — H93.13 TINNITUS OF BOTH EARS: ICD-10-CM

## 2023-08-08 DIAGNOSIS — R26.89 IMBALANCE: ICD-10-CM

## 2023-08-08 PROCEDURE — 99214 OFFICE O/P EST MOD 30 MIN: CPT | Performed by: OTOLARYNGOLOGY

## 2023-08-08 RX ORDER — POLYETHYLENE GLYCOL-3350 AND ELECTROLYTES 236; 6.74; 5.86; 2.97; 22.74 G/274.31G; G/274.31G; G/274.31G; G/274.31G; G/274.31G
POWDER, FOR SOLUTION ORAL
COMMUNITY
Start: 2023-07-31

## 2023-08-08 NOTE — PROGRESS NOTES
Patient Name: Jai Dow   Visit Date: 08/08/2023   Patient ID: [id number]  Provider: Markus Alford MD    Sex: male  [facility]   YOB: 1965  Location Address: [address] [place] [address], [address] [place] [address],?[id number]    Primary Care Provider  NeenaSARATH  Location Phone: [phone]    Referring Provider: Yasmani Vela DO        Chief Complaint  Follow-up (Tinnitus hearing loss )    Subjective    History of Present Illness  Jai Dow is a 58 y.o. male who presents to [facility] today as a consult from Yasmani Vela DO.    He presents to the clinic today for 6-month follow-up regarding acoustic neuroma, imbalance, and tenderness.  He notes no significant changes since I last saw him in the clinic.  He notes that for the most part he is doing well, but does have occasional lightheadedness and occasional dizziness.  He is using a cane to help him with his balance and notes no significant worsening.  He does have MS and is now on immunotherapy infusions for this.  He recently had an MRI scan which revealed signs of MS and stable findings.  He does have a small acoustic neuroma measuring about 2 mm in the left side.  I reviewed his audiogram results from March which reveal essentially normal hearing with just some mild low-frequency sensorineural hearing loss down to 25 dB.  He does have tinnitus at times and notes that his hearing is for the most part normal.    Past Medical History:   Diagnosis Date    Acoustic neuroma 8/8/2023    Cluster headache 2003    [employer]    Difficulty walking 2018    Knee replacements    Dizziness 1990 and 2003    Deployments to [place] and [place]    Head injury 2001    In the [employer] in the back of a track vehicle    Headache, tension-type 2003    [employer]    Hypertension 2006    Memory loss 2001    Increasing, memory loss Family members names, co-workers, recent projects    Migraine     Migraine      Multiple sclerosis 12/22/2022    PTSD (post-traumatic stress disorder)     Sleep apnea     Stop using the machine/felt like I couldn't breath  during nightmares    Tinnitus     1990 and 2003    Vision loss 2022    Blurred       Past Surgical History:   Procedure Laterality Date    COLONOSCOPY  09/2015    COLONOSCOPY N/A 8/7/2023    Procedure: COLONOSCOPY;  Surgeon: Alexis Bazzi MD;  Location: [facility] ENDOSCOPY;  Service: Gastroenterology;  Laterality: N/A;  HEMORRHOIDS    KNEE ACL RECONSTRUCTION      MENISCECTOMY      REPLACEMENT TOTAL KNEE Bilateral          Current Outpatient Medications:     amitriptyline (ELAVIL) 25 MG tablet, Take 2 tablets by mouth Every Night., Disp: , Rfl:     aspirin 81 MG EC tablet, Take 1 tablet by mouth Daily., Disp: , Rfl:     atenolol (TENORMIN) 50 MG tablet, Take 1 tablet by mouth Daily., Disp: , Rfl:     atorvastatin (LIPITOR) 10 MG tablet, Take 1 tablet by mouth Daily., Disp: , Rfl:     clobetasol (TEMOVATE) 0.05 % ointment, , Disp: , Rfl:     cyclobenzaprine (FLEXERIL) 10 MG tablet, Take 1 tablet by mouth 3 (Three) Times a Day As Needed., Disp: , Rfl:     Dalfampridine ER 10 MG tablet sustained-release 12 hour, Take 10 mg by mouth 2 (Two) Times a Day., Disp: 180 tablet, Rfl: 3    Emollient (AQUAPHOR OINTMENT BODY EX), Apply  topically., Disp: , Rfl:     Fremanezumab-vfrm (Ajovy) 225 MG/1.5ML solution auto-injector, Inject 225 mg under the skin into the appropriate area as directed Every 28 (Twenty-Eight) Days., Disp: 4.5 mL, Rfl: 3    GaviLyte-G 236 g solution, MIX AND DRINK AS DIRECTED, Disp: , Rfl:     hydroCHLOROthiazide (HYDRODIURIL) 25 MG tablet, Take 1 tablet by mouth Daily., Disp: 30 tablet, Rfl: 0    hydrocortisone 2.5 % cream, , Disp: , Rfl:     ibuprofen (ADVIL,MOTRIN) 800 MG tablet, ibuprofen 800 mg oral tablet take 1 tablet (800 mg) by oral route 3 times per day with food   Active, Disp: , Rfl:     lisinopril (PRINIVIL,ZESTRIL) 40 MG tablet, Take 1 tablet by  mouth Daily., Disp: 30 tablet, Rfl: 0    mupirocin (BACTROBAN) 2 % ointment, Apply  topically to the appropriate area as directed 3 (Three) Times a Day. apply topically to the affected area three times daily, Disp: , Rfl:     naproxen sodium (ANAPROX) 275 MG tablet, Take 1 tablet by mouth 2 (Two) Times a Day With Meals., Disp: , Rfl:     Omega-3 Fatty Acids (fish oil) 1000 MG capsule capsule, Take 1 capsule by mouth Daily With Breakfast., Disp: , Rfl:     ondansetron (ZOFRAN) 4 MG tablet, Zofran 4 mg oral tablet take 1 tablet by oral route PRN for migraines   Active, Disp: , Rfl:     oxybutynin XL (DITROPAN XL) 15 MG 24 hr tablet, Take 1 tablet by mouth Daily., Disp: 30 tablet, Rfl: 3    prazosin (MINIPRESS) 2 MG capsule, Take 1 capsule by mouth Daily., Disp: , Rfl:     pregabalin (Lyrica) 75 MG capsule, Take 1 capsule by mouth 2 (Two) Times a Day., Disp: 180 capsule, Rfl: 1    Rimegepant Sulfate (Nurtec) 75 MG tablet dispersible tablet, Take 1 tablet by mouth Take As Directed., Disp: , Rfl:     sertraline (ZOLOFT) 100 MG tablet, 2 tablets., Disp: , Rfl:     sildenafil (Viagra) 25 MG tablet, Take 1 tablet by mouth Daily As Needed for Erectile Dysfunction., Disp: 30 tablet, Rfl: 1    urea (CARMOL) 20 % cream, Apply 1 application  topically to the appropriate area as directed As Needed for Dry Skin., Disp: , Rfl:     ZOLMitriptan (ZOMIG-ZMT) 2.5 MG disintegrating tablet, Place 1 tablet on the tongue Take As Directed. TAKE ONE TAB AS NEEDED EVERY TWO HOURS FOR ADDITIONAL RELIEF, Disp: , Rfl:     Current Facility-Administered Medications:     Fremanezumab-vfrm solution prefilled syringe 225 mg, 225 mg, Subcutaneous, Once, Oropilla, Leopoldo Ruth MD     Allergies   Allergen Reactions    Amoxil [Amoxicillin] Angioedema       Family History   Problem Relation Age of Onset    Dementia Mother     Stroke Father     Hypertension Father     Osteoarthritis Father     Stroke Brother     Migraines Brother         Social History  "    Social History Narrative    Not on file       Objective     Vital Signs:   Temp 97.9 øF (36.6 øC) (Tympanic)   Ht 177.8 cm (70\")   Wt 102 kg (225 lb 6.4 oz)   BMI 32.34 kg/mý       Physical Exam         Constitutional   Appearance  : well developed, well-nourished, alert and in no acute distress, voice clear and strong    Head  Inspection  : no deformities or lesions  Face  Inspection  : No facial lesions; House-Brackmann I/VI bilaterally  Palpation  : No TMJ crepitus nor  muscle tenderness bilaterally    Eyes  Vision  Visual Fields  : Extraocular movements are intact. No spontaneous or gaze-induced nystagmus.  Conjunctivae  : clear  Sclerae  : clear  Pupils and Irises  : pupils equal, round, and reactive to light.     Ears, Nose, Mouth and Throat    Ears    External Ears  : appearance within normal limits, no lesions present  Otoscopic Examination  : Tympanic membrane appearance within normal limits bilaterally without perforations, well-aerated middle ears  Hearing  : intact to conversational voice both ears  Tunning fork testing:     :    Nose    External Nose  : appearance normal  Intranasal Exam  : mucosa within normal limits, vestibules normal, no intranasal lesions present, septum midline, sinuses non tender to percussion  Oral Cavity    Oral Mucosa  : oral mucosa normal without pallor or cyanosis  Lips  : lip appearance normal  Teeth  : normal dentition for age  Gums  : gums pink, non-swollen, no bleeding present  Tongue  : tongue appearance normal; normal mobility  Palate  : hard palate normal, soft palate appearance normal with symmetric mobility    Throat    Oropharynx  : no inflammation or lesions present, tonsils within normal limits  Hypopharynx  : appearance within normal limits, superior epiglottis within normal limits  Larynx  : appearance within normal limits, vocal cords within normal limits, no lesions present    Neck  Inspection/Palpation  : normal appearance, no masses or " tenderness, trachea midline; thyroid size normal, nontender, no nodules or masses present on palpation    Respiratory  Respiratory Effort  : breathing unlabored  Inspection of Chest  : normal appearance, no retractions    Cardiovascular  Heart  : regular rate and rhythm    Lymphatic  Neck  : no lymphadenopathy present  Supraclavicular Nodes  : no lymphadenopathy present  Preauricular Nodes  : no lymphadenopathy present    Skin and Subcutaneous Tissue  General Inspection  : Regarding face and neck - there are no rashes present, no lesions present, and no areas of discoloration    Neurologic  Cranial Nerves  : cranial nerves II-XII are grossly intact bilaterally  Gait and Station  : normal gait, negative Romberg, Fukuda marching test with slight deviation towards the right, able to stand without diffculty    Psychiatric  Judgement and Insight  : judgment and insight intact  Mood and Affect  : mood normal, affect appropriate          Assessment and Plan    Diagnoses and all orders for this visit:    1. Acoustic neuroma (Primary)    2. Tinnitus of both ears    3. Imbalance    Exam today revealed slight deviation towards the right and is Fukuda marching test and negative Romberg.  He walks well with his cane.  His audiogram shows some low-frequency sensorineural hearing loss which is very mild but essentially normal hearing otherwise.  Acoustic neuroma is small, and not sure how much of his balance issue or hearing issues are caused by the acoustic neuroma as he has a long history of loud noise exposure, and also has a confounding factor of his MS which can affect generalized balance.  We discussed safety measures to avoid falls.  I would like to see him back in 1 year with repeat audiogram and we will plan to keep track of the acoustic neuroma with his MRIs which are being ordered by his neurologist.  I have asked that he contact me should there be any issues and I will be glad to see him sooner if need be.    Follow Up    No follow-ups on file.  Patient was given instructions and counseling regarding his condition or for health maintenance advice. Please see specific information pulled into the AVS if appropriate.

## 2023-08-08 NOTE — TELEPHONE ENCOUNTER
PATIENT STATES AT LAST OV 7/20/23, DR ESPARZA WROTE A LETTER FOR HIM.    THE CASE ID NUMBER WAS NOT INCLUDED ON THE LETTER AND PATIENT IS ASKING FOR THIS TO BE ADDED AND SEND CORRECTED LETTER TO HIM VIA [employer].    PLEASE ADD:    CASE ID # [id number]    THANK YOU

## 2023-08-09 NOTE — TELEPHONE ENCOUNTER
Letters updated and sent to pt via [employer] and also printed. Signed by Dr. Vela will mail hard copies to pt- clr

## 2023-08-10 ENCOUNTER — HOSPITAL ENCOUNTER (OUTPATIENT)
Dept: MRI IMAGING | Facility: HOSPITAL | Age: 58
Discharge: HOME | End: 2023-08-10
Payer: OTHER GOVERNMENT

## 2023-08-10 ENCOUNTER — TELEPHONE (OUTPATIENT)
Dept: GASTROENTEROLOGY | Facility: CLINIC | Age: 58
End: 2023-08-10
Payer: OTHER GOVERNMENT

## 2023-08-10 DIAGNOSIS — G35 MULTIPLE SCLEROSIS: ICD-10-CM

## 2023-08-10 LAB
CREAT BLDA-MCNC: 1 MG/DL
EGFRCR SERPLBLD CKD-EPI 2021: 86.2 ML/MIN/1.73

## 2023-08-10 PROCEDURE — 0 GADOBENATE DIMEGLUMINE 529 MG/ML SOLUTION: Performed by: PSYCHIATRY & NEUROLOGY

## 2023-08-10 PROCEDURE — A9577 INJ MULTIHANCE: HCPCS | Performed by: PSYCHIATRY & NEUROLOGY

## 2023-08-10 PROCEDURE — 72142 MRI NECK SPINE W/DYE: CPT

## 2023-08-10 PROCEDURE — 82565 ASSAY OF CREATININE: CPT

## 2023-08-10 PROCEDURE — 70552 MRI BRAIN STEM W/DYE: CPT

## 2023-08-10 RX ADMIN — GADOBENATE DIMEGLUMINE 20 ML: 529 INJECTION, SOLUTION INTRAVENOUS at 11:20

## 2023-08-10 NOTE — TELEPHONE ENCOUNTER
I have reviewed the patients colonoscopy report. The report showed a normal colonoscopy with grade 1 internal hemorrhoids.  No polyps removed or specimens collected.  Repeat colonoscopy in 10 years. Please place in recall. Send letter.

## 2023-08-14 NOTE — TELEPHONE ENCOUNTER
I have added pt to 10 yr Colon Recall for [birth year]/7/33 and updated Health Maintenance. Letter sent to patient.

## 2023-08-17 ENCOUNTER — OFFICE VISIT (OUTPATIENT)
Dept: UROLOGY | Facility: CLINIC | Age: 58
End: 2023-08-17
Payer: OTHER GOVERNMENT

## 2023-08-17 VITALS — WEIGHT: 225 LBS | HEIGHT: 70 IN | RESPIRATION RATE: 16 BRPM | BODY MASS INDEX: 32.21 KG/M2

## 2023-08-17 DIAGNOSIS — R39.15 URGENCY OF URINATION: Primary | ICD-10-CM

## 2023-08-17 LAB
BILIRUB BLD-MCNC: NEGATIVE MG/DL
CLARITY, POC: CLEAR
COLOR UR: YELLOW
EXPIRATION DATE: NORMAL
GLUCOSE UR STRIP-MCNC: NEGATIVE MG/DL
KETONES UR QL: NEGATIVE
LEUKOCYTE EST, POC: NEGATIVE
Lab: NORMAL
NITRITE UR-MCNC: NEGATIVE MG/ML
PH UR: 7 [PH] (ref 5–8)
PROT UR STRIP-MCNC: NEGATIVE MG/DL
RBC # UR STRIP: NEGATIVE /UL
SP GR UR: 1.02 (ref 1–1.03)
URINE VOLUME: 0
UROBILINOGEN UR QL: NORMAL

## 2023-08-17 PROCEDURE — 99202 OFFICE O/P NEW SF 15 MIN: CPT | Performed by: NURSE PRACTITIONER

## 2023-08-17 PROCEDURE — 81003 URINALYSIS AUTO W/O SCOPE: CPT | Performed by: NURSE PRACTITIONER

## 2023-08-17 NOTE — PROGRESS NOTES
Chief Complaint: Urinary Urgency    Subjective         History of Present Illness  Jai Dow is a 58 y.o. male presents to [facility] to be seen for urinary urgency.    He began last summer with neurologic symptoms and  has been going through testing. He he was DX with ms.    He states that that he has symptoms that come and go with urinary urgncy and frequency.     He reports nocturia x 2-3.     Stream is split and weak at times.     He does have to strain to void at times.     Some hesitancy.     He was placed on oxynutynin by dr. Vela 1 week ago.        Objective     Past Medical History:   Diagnosis Date    Acoustic neuroma 8/8/2023    Cluster headache 2003    [facility]    Difficulty walking 2018    Knee replacements    Dizziness 1990 and 2003    Deployments to [place] and [place]    Head injury 2001    In the [employer] in the back of a track vehicle    Headache, tension-type 2003    [facility]    Hypertension 2006    Memory loss 2001    Increasing, memory loss Family members names, co-workers, recent projects    Migraine     Migraine     Multiple sclerosis 12/22/2022    PTSD (post-traumatic stress disorder)     Sleep apnea     Stop using the machine/felt like I couldn't breath  during nightmares    Tinnitus     1990 and 2003    Vision loss 2022    Blurred       Past Surgical History:   Procedure Laterality Date    COLONOSCOPY  09/2015    COLONOSCOPY N/A 8/7/2023    Procedure: COLONOSCOPY;  Surgeon: Alexis Bazzi MD;  Location: [facility] ENDOSCOPY;  Service: Gastroenterology;  Laterality: N/A;  HEMORRHOIDS    KNEE ACL RECONSTRUCTION      MENISCECTOMY      REPLACEMENT TOTAL KNEE Bilateral          Current Outpatient Medications:     amitriptyline (ELAVIL) 25 MG tablet, Take 2 tablets by mouth Every Night., Disp: , Rfl:     aspirin 81 MG EC tablet, Take 1 tablet by mouth Daily., Disp: , Rfl:     atenolol (TENORMIN) 50 MG tablet, Take 1 tablet by mouth Daily., Disp: , Rfl:     atorvastatin  (LIPITOR) 10 MG tablet, Take 1 tablet by mouth Daily., Disp: , Rfl:     clobetasol (TEMOVATE) 0.05 % ointment, , Disp: , Rfl:     cyclobenzaprine (FLEXERIL) 10 MG tablet, Take 1 tablet by mouth 3 (Three) Times a Day As Needed., Disp: , Rfl:     Dalfampridine ER 10 MG tablet sustained-release 12 hour, Take 10 mg by mouth 2 (Two) Times a Day., Disp: 180 tablet, Rfl: 3    Emollient (AQUAPHOR OINTMENT BODY EX), Apply  topically., Disp: , Rfl:     Fremanezumab-vfrm (Ajovy) 225 MG/1.5ML solution auto-injector, Inject 225 mg under the skin into the appropriate area as directed Every 28 (Twenty-Eight) Days., Disp: 4.5 mL, Rfl: 3    GaviLyte-G 236 g solution, MIX AND DRINK AS DIRECTED, Disp: , Rfl:     hydroCHLOROthiazide (HYDRODIURIL) 25 MG tablet, Take 1 tablet by mouth Daily., Disp: 30 tablet, Rfl: 0    hydrocortisone 2.5 % cream, , Disp: , Rfl:     ibuprofen (ADVIL,MOTRIN) 800 MG tablet, ibuprofen 800 mg oral tablet take 1 tablet (800 mg) by oral route 3 times per day with food   Active, Disp: , Rfl:     lisinopril (PRINIVIL,ZESTRIL) 40 MG tablet, Take 1 tablet by mouth Daily., Disp: 30 tablet, Rfl: 0    mupirocin (BACTROBAN) 2 % ointment, Apply  topically to the appropriate area as directed 3 (Three) Times a Day. apply topically to the affected area three times daily, Disp: , Rfl:     naproxen sodium (ANAPROX) 275 MG tablet, Take 1 tablet by mouth 2 (Two) Times a Day With Meals., Disp: , Rfl:     Omega-3 Fatty Acids (fish oil) 1000 MG capsule capsule, Take 1 capsule by mouth Daily With Breakfast., Disp: , Rfl:     ondansetron (ZOFRAN) 4 MG tablet, Zofran 4 mg oral tablet take 1 tablet by oral route PRN for migraines   Active, Disp: , Rfl:     oxybutynin XL (DITROPAN XL) 15 MG 24 hr tablet, Take 1 tablet by mouth Daily., Disp: 30 tablet, Rfl: 3    prazosin (MINIPRESS) 2 MG capsule, Take 1 capsule by mouth Daily., Disp: , Rfl:     pregabalin (Lyrica) 75 MG capsule, Take 1 capsule by mouth 2 (Two) Times a Day., Disp: 180  "capsule, Rfl: 1    Rimegepant Sulfate (Nurtec) 75 MG tablet dispersible tablet, Take 1 tablet by mouth Take As Directed., Disp: , Rfl:     sertraline (ZOLOFT) 100 MG tablet, 2 tablets., Disp: , Rfl:     sildenafil (Viagra) 25 MG tablet, Take 1 tablet by mouth Daily As Needed for Erectile Dysfunction., Disp: 30 tablet, Rfl: 1    urea (CARMOL) 20 % cream, Apply 1 application  topically to the appropriate area as directed As Needed for Dry Skin., Disp: , Rfl:     ZOLMitriptan (ZOMIG-ZMT) 2.5 MG disintegrating tablet, Place 1 tablet on the tongue Take As Directed. TAKE ONE TAB AS NEEDED EVERY TWO HOURS FOR ADDITIONAL RELIEF, Disp: , Rfl:     Current Facility-Administered Medications:     Fremanezumab-vfrm solution prefilled syringe 225 mg, 225 mg, Subcutaneous, Once, Oropilla, Leopoldo Ruth MD    Allergies   Allergen Reactions    Amoxil [Amoxicillin] Angioedema        Family History   Problem Relation Age of Onset    Dementia Mother     Stroke Father     Hypertension Father     Osteoarthritis Father     Stroke Brother     Migraines Brother        Social History     Socioeconomic History    Marital status:    Tobacco Use    Smoking status: Former     Packs/day: 0.50     Years: 10.00     Pack years: 5.00     Types: Cigarettes     Start date: 3/31/1980     Quit date: 6/10/1990     Years since quittin.2    Smokeless tobacco: Never   Vaping Use    Vaping Use: Never used   Substance and Sexual Activity    Alcohol use: Not Currently     Alcohol/week: 5.0 - 9.0 standard drinks     Types: 1 - 2 Cans of beer, 4 - 7 Shots of liquor per week     Comment: Drank everyday after the [place] War    Drug use: Never    Sexual activity: Yes     Partners: Female     Birth control/protection: None       Vital Signs:   Resp 16   Ht 177.8 cm (70\")   Wt 102 kg (225 lb)   BMI 32.28 kg/mý      Physical Exam     Result Review :   The following data was reviewed by: SARATH Gottlieb on 2023:  Results for orders placed or " performed in visit on 08/17/23   Bladder Scan   Result Value Ref Range    Urine Volume 0    POC Urinalysis Dipstick, Automated    Specimen: Urine   Result Value Ref Range    Color Yellow Yellow, Straw, Dark Yellow, Mariely    Clarity, UA Clear Clear    Specific Gravity  1.020 1.005 - 1.030    pH, Urine 7.0 5.0 - 8.0    Leukocytes Negative Negative    Nitrite, UA Negative Negative    Protein, POC Negative Negative mg/dL    Glucose, UA Negative Negative mg/dL    Ketones, UA Negative Negative    Urobilinogen, UA Normal Normal, 0.2 E.U./dL    Bilirubin Negative Negative    Blood, UA Negative Negative    Lot Number [id number]     Expiration Date 7/2,024       PSA          7/27/2023    09:49   PSA   PSA 1.520      Bladder Scan interpretation 08/17/2023    Estimation of residual urine via AlereonI 3000 Verathon Bladder Scan  MA/nurse performing: Scott ANDERSON RN   Residual Urine: 0 ml  Indication: Urgency of urination   Position: Supine  Examination: Incremental scanning of the suprapubic area using 2.0 MHz transducer using copious amounts of acoustic gel.   Findings: An anechoic area was demonstrated which represented the bladder, with measurement of residual urine as noted. I inspected this myself. In that the residual urine was stable or insignificant, refer to plan for treatment and plan necessary at this time.          Procedures        Assessment and Plan    Diagnoses and all orders for this visit:    1. Urgency of urination (Primary)  -     POC Urinalysis Dipstick, Automated  -     Bladder Scan      The patient will continue oxybutynin at this point in time.    Did discuss with the patient if he has failure to improve with the oxybutynin may consider a urodynamics test given some of his lower urinary tract symptoms he is experiencing.          I spent 15  minutes caring for Jai on this date of service. This time includes time spent by me in the following activities:reviewing tests, obtaining and/or reviewing a separately  obtained history, performing a medically appropriate examination and/or evaluation , counseling and educating the patient/family/caregiver, ordering medications, tests, or procedures, and documenting information in the medical record  Follow Up   Return in about 8 weeks (around 10/12/2023) for f/u oab with [facility] .  Patient was given instructions and counseling regarding his condition or for health maintenance advice. Please see specific information pulled into the AVS if appropriate.         This document has been electronically signed by SARATH Gottlieb  August 17, 2023 10:32 EDT

## 2023-08-22 RX ORDER — OXYBUTYNIN CHLORIDE 15 MG/1
15 TABLET, EXTENDED RELEASE ORAL DAILY
Qty: 90 TABLET | Refills: 3 | Status: SHIPPED | OUTPATIENT
Start: 2023-08-22

## 2023-08-22 NOTE — TELEPHONE ENCOUNTER
Caller: Jai Dow    Relationship: Self    Best call back number: [phone]    Requested Prescriptions:   Requested Prescriptions     Pending Prescriptions Disp Refills    oxybutynin XL (DITROPAN XL) 15 MG 24 hr tablet 30 tablet 3     Sig: Take 1 tablet by mouth Daily.        Pharmacy where request should be sent: [facility] [address] [phone] [facility] [phone]      Last office visit with prescribing clinician: 7/20/2023   Last telemedicine visit with prescribing clinician: Visit date not found   Next office visit with prescribing clinician: 10/12/2023     Additional details provided by patient: PT STATES HE HAS CHANGED PHARMACIES AND NEED THIS MEDICATION RE-ROUTED TO THE PHARMACY ABOVE PLEASE. REFILLS ONLY HE CURRENTLY HAS 30 DAYS OF MEDICATION.     Does the patient have less than a 3 day supply:  [] Yes  [x] No    Would you like a call back once the refill request has been completed: [] Yes [x] No    If the office needs to give you a call back, can they leave a voicemail: [] Yes [x] No    Evelia Montgomery Rep   08/22/23 11:19 EDT

## 2023-08-28 RX ORDER — HYDROCHLOROTHIAZIDE 25 MG/1
25 TABLET ORAL DAILY
Qty: 90 TABLET | Refills: 1 | Status: SHIPPED | OUTPATIENT
Start: 2023-08-28

## 2023-08-28 RX ORDER — LISINOPRIL 40 MG/1
40 TABLET ORAL DAILY
Qty: 90 TABLET | Refills: 1 | Status: SHIPPED | OUTPATIENT
Start: 2023-08-28

## 2023-08-28 NOTE — TELEPHONE ENCOUNTER
Caller: HAM DAN    Relationship: Emergency Contact    Best call back number: [phone]    Requested Prescriptions:   Requested Prescriptions     Pending Prescriptions Disp Refills    lisinopril (PRINIVIL,ZESTRIL) 40 MG tablet 30 tablet 0     Sig: Take 1 tablet by mouth Daily.    hydroCHLOROthiazide (HYDRODIURIL) 25 MG tablet 30 tablet 0     Sig: Take 1 tablet by mouth Daily.        Pharmacy where request should be sent: [id number] [address]  AT [facility] [address] - [phone] [facility] [phone] [facility]     Last office visit with prescribing clinician: 7/27/2023   Last telemedicine visit with prescribing clinician: Visit date not found   Next office visit with prescribing clinician: 1/29/2024     Does the patient have less than a 3 day supply:  [x] Yes  [] No

## 2023-08-29 ENCOUNTER — TELEPHONE (OUTPATIENT)
Dept: NEUROLOGY | Facility: CLINIC | Age: 58
End: 2023-08-29
Payer: OTHER GOVERNMENT

## 2023-08-29 NOTE — TELEPHONE ENCOUNTER
"Received letter from disability determination services requesting medical records on pt, with him filing for disability.     Noted on this letter that is has pts name as Lazarus Anthony Victor M they do have pts name on letter correctly.     Reached out to disability department and informed them of this. First person I spoke with informed me that she would send a message to the . She confirmed we had the correct pt by mentioning pts first 3 numbers of his social security number. Spoke with a second person and he stated that the name on this letter is the name on pts birth certificate.     Reached out to [facility]/Medical records department. Informed them of the letter I received and about the difference in pts name.    She stated if the letter we have doesn't also have his social security number listed, they wouldn't be able to send over records.     Noted on the back of one of the pages is an \"authorization to disclose information to the social security administration\" it does list patients SSN on it. I circled this.     There is a place where dr cruz has to answer a question and sign/date.     Once he does this, I will fax over this request to [facility] at [phone].     -clr  "

## 2023-08-31 NOTE — TELEPHONE ENCOUNTER
"I have attempted to fax over this request to [facility] 10 times now. I keep getting a response of \"NO ANSWER\".     I reached out to the [facility] department to make sure I had the correct # and they informed me I did, and that they haven't had any issues with faxes coming in, they just get quite a few faxes throughout the day.     Will attempt to send information over to [facility] tomorrow.     -clr  "

## 2023-09-28 ENCOUNTER — OFFICE VISIT (OUTPATIENT)
Dept: FAMILY MEDICINE CLINIC | Facility: CLINIC | Age: 58
End: 2023-09-28
Payer: OTHER GOVERNMENT

## 2023-09-28 VITALS
HEIGHT: 70 IN | SYSTOLIC BLOOD PRESSURE: 118 MMHG | BODY MASS INDEX: 31.64 KG/M2 | HEART RATE: 72 BPM | DIASTOLIC BLOOD PRESSURE: 80 MMHG | OXYGEN SATURATION: 98 % | WEIGHT: 221 LBS

## 2023-09-28 DIAGNOSIS — H01.001 BLEPHARITIS OF RIGHT UPPER EYELID, UNSPECIFIED TYPE: Primary | ICD-10-CM

## 2023-09-28 PROCEDURE — 99213 OFFICE O/P EST LOW 20 MIN: CPT | Performed by: NURSE PRACTITIONER

## 2023-09-28 RX ORDER — ERYTHROMYCIN 5 MG/G
OINTMENT OPHTHALMIC NIGHTLY
Qty: 3.5 G | Refills: 0 | Status: SHIPPED | OUTPATIENT
Start: 2023-09-28

## 2023-09-28 NOTE — PROGRESS NOTES
Chief Complaint  eye swelling    Subjective            Jai Dow presents to [facility]  History of Present Illness  Pt has c/o (R) eye swelling. Pt states this started approximately 4 days ago. Pt only had slight swelling. [facility] and triamcinalone cream was prescribed for topical use. Pt states his eye does have fluid and crust build up. Pt states he has been using ice with little relief.       Past Medical History:   Diagnosis Date    Acoustic neuroma 2023    Cluster headache     [employer]    Difficulty walking     Knee replacements    Dizziness  and     Deployments to [place] and [place]    Head injury     In the [employer] in the back of a track vehicle    Headache, tension-type     [employer]    Hypertension     Memory loss     Increasing, memory loss Family members names, co-workers, recent projects    Migraine     Migraine     Multiple sclerosis 2022    PTSD (post-traumatic stress disorder)     Sleep apnea     Stop using the machine/felt like I couldn’t breath  during nightmares    Tinnitus      and     Vision loss     Blurred       Allergies   Allergen Reactions    Amoxil [Amoxicillin] Angioedema        Past Surgical History:   Procedure Laterality Date    COLONOSCOPY  2015    COLONOSCOPY N/A 2023    Procedure: COLONOSCOPY;  Surgeon: Alexis Bazzi MD;  Location: [facility] ENDOSCOPY;  Service: Gastroenterology;  Laterality: N/A;  HEMORRHOIDS    KNEE ACL RECONSTRUCTION      MENISCECTOMY      REPLACEMENT TOTAL KNEE Bilateral         Social History     Tobacco Use    Smoking status: Former     Packs/day: 0.50     Years: 10.00     Pack years: 5.00     Types: Cigarettes     Start date: 3/31/1980     Quit date: 6/10/1990     Years since quittin.3    Smokeless tobacco: Never   Substance Use Topics    Alcohol use: Not Currently     Alcohol/week: 5.0 - 9.0 standard drinks     Types: 1 - 2 Cans of beer,  4 - 7 Shots of liquor per week     Comment: Drank everyday after the [place] War       Family History   Problem Relation Age of Onset    Dementia Mother     Stroke Father     Hypertension Father     Osteoarthritis Father     Stroke Brother     Migraines Brother         Current Outpatient Medications on File Prior to Visit   Medication Sig    amitriptyline (ELAVIL) 25 MG tablet Take 2 tablets by mouth Every Night.    atenolol (TENORMIN) 50 MG tablet Take 1 tablet by mouth Daily.    atorvastatin (LIPITOR) 10 MG tablet Take 1 tablet by mouth Daily.    clobetasol (TEMOVATE) 0.05 % ointment     cyclobenzaprine (FLEXERIL) 10 MG tablet Take 1 tablet by mouth 3 (Three) Times a Day As Needed.    Dalfampridine ER 10 MG tablet sustained-release 12 hour Take 10 mg by mouth 2 (Two) Times a Day.    Emollient (AQUAPHOR OINTMENT BODY EX) Apply  topically.    Fremanezumab-vfrm (Ajovy) 225 MG/1.5ML solution auto-injector Inject 225 mg under the skin into the appropriate area as directed Every 28 (Twenty-Eight) Days.    hydroCHLOROthiazide (HYDRODIURIL) 25 MG tablet Take 1 tablet by mouth Daily.    hydrocortisone 2.5 % cream     ibuprofen (ADVIL,MOTRIN) 800 MG tablet ibuprofen 800 mg oral tablet take 1 tablet (800 mg) by oral route 3 times per day with food   Active    lisinopril (PRINIVIL,ZESTRIL) 40 MG tablet Take 1 tablet by mouth Daily.    mupirocin (BACTROBAN) 2 % ointment Apply  topically to the appropriate area as directed 3 (Three) Times a Day. apply topically to the affected area three times daily    naproxen sodium (ANAPROX) 275 MG tablet Take 1 tablet by mouth 2 (Two) Times a Day With Meals.    Ocrelizumab (OCREVUS IV)     Omega-3 Fatty Acids (fish oil) 1000 MG capsule capsule Take 1 capsule by mouth Daily With Breakfast.    ondansetron (ZOFRAN) 4 MG tablet Zofran 4 mg oral tablet take 1 tablet by oral route PRN for migraines   Active    oxybutynin XL (DITROPAN XL) 15 MG 24 hr tablet Take 1 tablet by mouth Daily.    prazosin  "(MINIPRESS) 2 MG capsule Take 1 capsule by mouth Daily.    pregabalin (Lyrica) 75 MG capsule Take 1 capsule by mouth 2 (Two) Times a Day.    Rimegepant Sulfate (Nurtec) 75 MG tablet dispersible tablet Take 1 tablet by mouth Take As Directed.    sertraline (ZOLOFT) 100 MG tablet 2 tablets.    sildenafil (Viagra) 25 MG tablet Take 1 tablet by mouth Daily As Needed for Erectile Dysfunction.    urea (CARMOL) 20 % cream Apply 1 application  topically to the appropriate area as directed As Needed for Dry Skin.    [DISCONTINUED] aspirin 81 MG EC tablet Take 1 tablet by mouth Daily.    [DISCONTINUED] GaviLyte-G 236 g solution MIX AND DRINK AS DIRECTED    [DISCONTINUED] ZOLMitriptan (ZOMIG-ZMT) 2.5 MG disintegrating tablet Place 1 tablet on the tongue Take As Directed. TAKE ONE TAB AS NEEDED EVERY TWO HOURS FOR ADDITIONAL RELIEF     Current Facility-Administered Medications on File Prior to Visit   Medication    Fremanezumab-vfrm solution prefilled syringe 225 mg       Health Maintenance Due   Topic Date Due    BMI FOLLOWUP  08/25/2023       Objective     /80   Pulse 72   Ht 177.8 cm (70\")   Wt 100 kg (221 lb)   SpO2 98%   BMI 31.71 kg/m²       Physical Exam  Constitutional:       General: He is not in acute distress.     Appearance: Normal appearance. He is not ill-appearing.   HENT:      Head: Normocephalic and atraumatic.   Eyes:      Extraocular Movements: Extraocular movements intact.      Pupils: Pupils are equal, round, and reactive to light.      Comments: Right eye lid slightly swollen an irritated   Cardiovascular:      Rate and Rhythm: Normal rate and regular rhythm.      Heart sounds: Normal heart sounds. No murmur heard.  Pulmonary:      Effort: Pulmonary effort is normal. No respiratory distress.      Breath sounds: Normal breath sounds.   Chest:      Chest wall: No tenderness.   Abdominal:      General: There is no distension.      Palpations: There is no mass.      Tenderness: There is no abdominal " tenderness. There is no guarding.   Musculoskeletal:         General: No swelling or tenderness. Normal range of motion.      Cervical back: Normal range of motion and neck supple.   Skin:     General: Skin is warm and dry.      Findings: No rash.   Neurological:      General: No focal deficit present.      Mental Status: He is alert and oriented to person, place, and time. Mental status is at baseline.      Gait: Gait normal.   Psychiatric:         Mood and Affect: Mood normal.         Behavior: Behavior normal.         Thought Content: Thought content normal.         Judgment: Judgment normal.         Result Review :                           Assessment and Plan        Diagnoses and all orders for this visit:    1. Blepharitis of right upper eyelid, unspecified type (Primary)  Comments:  advised to clean lashline with babyshamoo and water  Orders:  -     erythromycin (ROMYCIN) 5 MG/GM ophthalmic ointment; Administer  to the right eye Every Night.  Dispense: 3.5 g; Refill: 0              Follow Up     No follow-ups on file.    Patient was given instructions and counseling regarding his condition or for health maintenance advice. Please see specific information pulled into the AVS if appropriate.     Jai MISAEL Dow  reports that he quit smoking about 33 years ago. His smoking use included cigarettes. He started smoking about 43 years ago. He has a 5.00 pack-year smoking history. He has never used smokeless tobacco..

## 2023-10-12 ENCOUNTER — OFFICE VISIT (OUTPATIENT)
Dept: NEUROLOGY | Facility: CLINIC | Age: 58
End: 2023-10-12
Payer: OTHER GOVERNMENT

## 2023-10-12 VITALS
WEIGHT: 220.8 LBS | HEART RATE: 61 BPM | BODY MASS INDEX: 31.61 KG/M2 | DIASTOLIC BLOOD PRESSURE: 95 MMHG | OXYGEN SATURATION: 98 % | SYSTOLIC BLOOD PRESSURE: 151 MMHG | HEIGHT: 70 IN

## 2023-10-12 DIAGNOSIS — G35 MULTIPLE SCLEROSIS: Primary | ICD-10-CM

## 2023-10-12 PROCEDURE — 99214 OFFICE O/P EST MOD 30 MIN: CPT | Performed by: PSYCHIATRY & NEUROLOGY

## 2023-10-12 RX ORDER — PREGABALIN 150 MG/1
150 CAPSULE ORAL 2 TIMES DAILY
Qty: 60 CAPSULE | Refills: 2 | Status: SHIPPED | OUTPATIENT
Start: 2023-10-12

## 2023-10-12 NOTE — PROGRESS NOTES
This is a very pleasant 58-year-old gentleman with multiple sclerosis currently on Ocrevus.  He reports no new neurologic issues since I saw him last.  Overall he states that he feels good he still struggling with urinary urgency and hesitancy.  He is scheduled to see urology in a couple weeks.  He denies any opportunistic infections no side effects from the medication.  He has been having rash which was evaluated by dermatology and thought to be eczema.  He states this predates him starting the Ocrevus.  He does report he is having a little bit more paresthesias in both of his hands he is on Lyrica 75 mg p.o. twice daily which she states does help but he feels like its been a little bit worse over the last 2 to 3 weeks.    I reviewed the most recent MRI of the brain and cervical spinal cord which shows no active disease or progression of disease.        In summary this very pleasant 58-year-old gentleman with multiple sclerosis currently on Ocrevus.  He is doing well in regards to his multiple sclerosis he will continue Ocrevus.  I am going to increase his Lyrica to 150 mg p.o. twice daily.        I explained to them that I am leaving [facility] later this year.  I am going to refer him to one of the other [facility] neurologist.  Of course if they have any problems before I leave I be happy to see him again.        I spent 40 minutes in patient care.

## 2023-10-13 ENCOUNTER — OFFICE VISIT (OUTPATIENT)
Dept: UROLOGY | Facility: CLINIC | Age: 58
End: 2023-10-13
Payer: OTHER GOVERNMENT

## 2023-10-13 VITALS
HEART RATE: 76 BPM | DIASTOLIC BLOOD PRESSURE: 96 MMHG | SYSTOLIC BLOOD PRESSURE: 143 MMHG | BODY MASS INDEX: 31.56 KG/M2 | WEIGHT: 220.46 LBS | HEIGHT: 70 IN

## 2023-10-13 DIAGNOSIS — R39.15 URGENCY OF URINATION: Primary | ICD-10-CM

## 2023-10-13 LAB
BILIRUB BLD-MCNC: ABNORMAL MG/DL
CLARITY, POC: CLEAR
COLOR UR: YELLOW
EXPIRATION DATE: ABNORMAL
GLUCOSE UR STRIP-MCNC: NEGATIVE MG/DL
KETONES UR QL: NEGATIVE
LEUKOCYTE EST, POC: NEGATIVE
Lab: ABNORMAL
NITRITE UR-MCNC: NEGATIVE MG/ML
PH UR: 6 [PH] (ref 5–8)
PROT UR STRIP-MCNC: ABNORMAL MG/DL
RBC # UR STRIP: NEGATIVE /UL
SP GR UR: 1.02 (ref 1–1.03)
URINE VOLUME: 0
UROBILINOGEN UR QL: NORMAL

## 2023-10-13 NOTE — PROGRESS NOTES
Chief Complaint: overactive bladder    Subjective         History of Present Illness  Jai Dow is a 58 y.o. male presents to [facility] to be seen for urinary urgency.    He states that he is with urgency and still with urge incontinence.    He sates he is having constipation.    Still with weak and split stream.    Previous:     He began last summer with neurologic symptoms and  has been going through testing. He he was DX with ms.    He states that that he has symptoms that come and go with urinary urgncy and frequency.     He reports nocturia x 2-3.     Stream is split and weak at times.     He does have to strain to void at times.     Some hesitancy.     He was placed on oxynutynin by dr. Vela 1 week ago.        Objective     Past Medical History:   Diagnosis Date    Acoustic neuroma 8/8/2023    Cluster headache 2003    [facility]    Difficulty walking 2018    Knee replacements    Dizziness 1990 and 2003    Deployments to [place] and [place]    Head injury 2001    In the [employer] in the back of a track vehicle    Headache, tension-type 2003    [facility]    Hypertension 2006    Memory loss 2001    Increasing, memory loss Family members names, co-workers, recent projects    Migraine     Migraine     Multiple sclerosis 12/22/2022    PTSD (post-traumatic stress disorder)     Sleep apnea     Stop using the machine/felt like I couldn't breath  during nightmares    Tinnitus     1990 and 2003    Vision loss 2022    Blurred       Past Surgical History:   Procedure Laterality Date    COLONOSCOPY  09/2015    COLONOSCOPY N/A 8/7/2023    Procedure: COLONOSCOPY;  Surgeon: Alexis Bazzi MD;  Location: [facility] ENDOSCOPY;  Service: Gastroenterology;  Laterality: N/A;  HEMORRHOIDS    KNEE ACL RECONSTRUCTION      MENISCECTOMY      REPLACEMENT TOTAL KNEE Bilateral          Current Outpatient Medications:     amitriptyline (ELAVIL) 25 MG tablet, Take 2 tablets by mouth Every Night., Disp: , Rfl:      atenolol (TENORMIN) 50 MG tablet, Take 1 tablet by mouth Daily., Disp: , Rfl:     atorvastatin (LIPITOR) 10 MG tablet, Take 1 tablet by mouth Daily., Disp: , Rfl:     clobetasol (TEMOVATE) 0.05 % ointment, , Disp: , Rfl:     cyclobenzaprine (FLEXERIL) 10 MG tablet, Take 1 tablet by mouth 3 (Three) Times a Day As Needed., Disp: , Rfl:     Dalfampridine ER 10 MG tablet sustained-release 12 hour, Take 10 mg by mouth 2 (Two) Times a Day., Disp: 180 tablet, Rfl: 3    Emollient (AQUAPHOR OINTMENT BODY EX), Apply  topically., Disp: , Rfl:     erythromycin (ROMYCIN) 5 MG/GM ophthalmic ointment, Administer  to the right eye Every Night., Disp: 3.5 g, Rfl: 0    Fremanezumab-vfrm (Ajovy) 225 MG/1.5ML solution auto-injector, Inject 225 mg under the skin into the appropriate area as directed Every 28 (Twenty-Eight) Days., Disp: 4.5 mL, Rfl: 3    hydroCHLOROthiazide (HYDRODIURIL) 25 MG tablet, Take 1 tablet by mouth Daily., Disp: 90 tablet, Rfl: 1    hydrocortisone 2.5 % cream, , Disp: , Rfl:     ibuprofen (ADVIL,MOTRIN) 800 MG tablet, ibuprofen 800 mg oral tablet take 1 tablet (800 mg) by oral route 3 times per day with food   Active, Disp: , Rfl:     lisinopril (PRINIVIL,ZESTRIL) 40 MG tablet, Take 1 tablet by mouth Daily., Disp: 90 tablet, Rfl: 1    mupirocin (BACTROBAN) 2 % ointment, Apply  topically to the appropriate area as directed 3 (Three) Times a Day. apply topically to the affected area three times daily, Disp: , Rfl:     naproxen sodium (ANAPROX) 275 MG tablet, Take 1 tablet by mouth 2 (Two) Times a Day With Meals., Disp: , Rfl:     Ocrelizumab (OCREVUS IV), , Disp: , Rfl:     Omega-3 Fatty Acids (fish oil) 1000 MG capsule capsule, Take 1 capsule by mouth Daily With Breakfast., Disp: , Rfl:     ondansetron (ZOFRAN) 4 MG tablet, Zofran 4 mg oral tablet take 1 tablet by oral route PRN for migraines   Active, Disp: , Rfl:     prazosin (MINIPRESS) 2 MG capsule, Take 1 capsule by mouth Daily., Disp: , Rfl:     pregabalin  "(Lyrica) 150 MG capsule, Take 1 capsule by mouth 2 (Two) Times a Day., Disp: 60 capsule, Rfl: 2    Rimegepant Sulfate (Nurtec) 75 MG tablet dispersible tablet, Take 1 tablet by mouth Take As Directed., Disp: , Rfl:     sertraline (ZOLOFT) 100 MG tablet, 2 tablets., Disp: , Rfl:     sildenafil (Viagra) 25 MG tablet, Take 1 tablet by mouth Daily As Needed for Erectile Dysfunction., Disp: 30 tablet, Rfl: 1    urea (CARMOL) 20 % cream, Apply 1 application  topically to the appropriate area as directed As Needed for Dry Skin., Disp: , Rfl:     Vibegron 75 MG tablet, Take 1 tablet by mouth Daily., Disp: 90 tablet, Rfl: 3    Current Facility-Administered Medications:     Fremanezumab-vfrm solution prefilled syringe 225 mg, 225 mg, Subcutaneous, Once, Oropilla, Leopoldo Ruth MD    Allergies   Allergen Reactions    Amoxil [Amoxicillin] Angioedema        Family History   Problem Relation Age of Onset    Dementia Mother     Stroke Father     Hypertension Father     Osteoarthritis Father     Stroke Brother     Migraines Brother        Social History     Socioeconomic History    Marital status:    Tobacco Use    Smoking status: Former     Packs/day: 0.50     Years: 10.00     Additional pack years: 0.00     Total pack years: 5.00     Types: Cigarettes     Start date: 3/31/1980     Quit date: 6/10/1990     Years since quittin.3     Passive exposure: Past    Smokeless tobacco: Never   Vaping Use    Vaping Use: Never used   Substance and Sexual Activity    Alcohol use: Not Currently     Alcohol/week: 5.0 - 9.0 standard drinks of alcohol     Types: 1 - 2 Cans of beer, 4 - 7 Shots of liquor per week     Comment: Drank everyday after the [place] War    Drug use: Never    Sexual activity: Yes     Partners: Female     Birth control/protection: None       Vital Signs:   /96 (BP Location: Left arm, Patient Position: Sitting, Cuff Size: Adult)   Pulse 76   Ht 177.8 cm (70\")   Wt 100 kg (220 lb 7.4 oz)   BMI 31.63 kg/mý "      Physical Exam     Result Review :   The following data was reviewed by: SARATH Gottlieb on 10/13/2023:  Results for orders placed or performed in visit on 10/13/23   Bladder Scan   Result Value Ref Range    Urine Volume 0    POC Urinalysis Dipstick, Automated    Specimen: Urine   Result Value Ref Range    Color Yellow Yellow, Straw, Dark Yellow, Mariely    Clarity, UA Clear Clear    Specific Gravity  1.020 1.005 - 1.030    pH, Urine 6.0 5.0 - 8.0    Leukocytes Negative Negative    Nitrite, UA Negative Negative    Protein, POC Trace (A) Negative mg/dL    Glucose, UA Negative Negative mg/dL    Ketones, UA Negative Negative    Urobilinogen, UA Normal Normal, 0.2 E.U./dL    Bilirubin Small (1+) (A) Negative    Blood, UA Negative Negative    Lot Number [id number]     Expiration Date 9/2,024       PSA          7/27/2023    09:49   PSA   PSA 1.520      Bladder Scan interpretation 10/13/2023    Estimation of residual urine via [employer] 3000 Verathon Bladder Scan  MA/nurse performing: ENZO SPRAGUE MA    Residual Urine: 0 ml  Indication: Urgency of urination   Position: Supine  Examination: Incremental scanning of the suprapubic area using 2.0 MHz transducer using copious amounts of acoustic gel.   Findings: An anechoic area was demonstrated which represented the bladder, with measurement of residual urine as noted. I inspected this myself. In that the residual urine was stable or insignificant, refer to plan for treatment and plan necessary at this time.          Procedures        Assessment and Plan    Diagnoses and all orders for this visit:    1. Urgency of urination (Primary)  -     Bladder Scan  -     POC Urinalysis Dipstick, Automated  -     Discontinue: Vibegron 75 MG tablet; Take 1 tablet by mouth Daily.  Dispense: 90 tablet; Refill: 3  -     Vibegron 75 MG tablet; Take 1 tablet by mouth Daily.  Dispense: 90 tablet; Refill: 3        We will stop oxybutynin and start him on Gemtesa to see if this will help with some of  his urgency and frequency.    I did offer treatment for BPH with alpha blockers however patient does not wish to proceed with this at this time given the side effect profile.        I spent 10  minutes caring for Jai on this date of service. This time includes time spent by me in the following activities:reviewing tests, obtaining and/or reviewing a separately obtained history, performing a medically appropriate examination and/or evaluation , counseling and educating the patient/family/caregiver, ordering medications, tests, or procedures, and documenting information in the medical record  Follow Up   Return in about 8 weeks (around 12/8/2023) for f/u [facility].  Patient was given instructions and counseling regarding his condition or for health maintenance advice. Please see specific information pulled into the AVS if appropriate.         This document has been electronically signed by SARATH Gottlieb  October 13, 2023 12:45 EDT

## 2023-11-08 ENCOUNTER — TELEPHONE (OUTPATIENT)
Dept: FAMILY MEDICINE CLINIC | Facility: CLINIC | Age: 58
End: 2023-11-08
Payer: OTHER GOVERNMENT

## 2023-11-08 DIAGNOSIS — H57.89 EYE SWELLING: Primary | ICD-10-CM

## 2023-11-08 NOTE — TELEPHONE ENCOUNTER
Pt's spouse aware. Will call back to change referral/ location if Huan and Dilan can't get pt in soon.

## 2023-11-08 NOTE — TELEPHONE ENCOUNTER
Pt's spouse called stating pt's eye has worsened. Pt was seen on 9/28 for this. Pt was prescribed erythromycin eye drops.   [facility]am[facility]one [facility] given.     Pt's spouse reports his eye is still swelling, skin, drying, and now blurry vision.     Requesting referral to [facility].     Please advise.

## 2023-11-22 ENCOUNTER — DOCUMENTATION (OUTPATIENT)
Dept: UROLOGY | Facility: CLINIC | Age: 58
End: 2023-11-22
Payer: OTHER GOVERNMENT

## 2023-11-22 ENCOUNTER — TELEPHONE (OUTPATIENT)
Dept: UROLOGY | Facility: CLINIC | Age: 58
End: 2023-11-22
Payer: OTHER GOVERNMENT

## 2023-11-22 DIAGNOSIS — R39.15 URGENCY OF URINATION: Primary | ICD-10-CM

## 2023-11-22 NOTE — TELEPHONE ENCOUNTER
PATIENT CALLED AND ASKED FOR HIS PRESCRIPTION FOR VIBEGRON TO BE SENT TO [facility].  HE SAID HE WILL PAY THE OUT-OF-POCKET THERE, AND IT WILL NOT BE MUCH.    IT WAS ORIGINALLY SENT TO [facility].    HE SAID IT IS GOING TO TAKE A WHILE FOR HIM TO BE ABLE TO [facility].  HE SAID HE HAS TO GET [facility] TO [facility] FOR HIS PCP TO REVIEW, AND THEN PCP HAS TO P[facility] IN TO [facility].    HE IS AWARE RYANNE WILL NOT BE IN OFFICE UNTIL MONDAY, AND HE IS OKAY WITH WAITING UNTIL THEN.

## 2023-11-27 NOTE — TELEPHONE ENCOUNTER
PATIENT CALLED AND SAID [facility] TOLD HIM THE PRESCRIPTION IS GOING TO BE $1600, AND THEY SENT SOMETHING TO THE OFFICE.  THEY NEED TO KNOW WHY HE NEEDS IT.    PATIENT SAID IT IF FOR THE MS-URGENCY.      HE SAID HE REALLY NEEDS IT, BECAUSE HE STOPPED THE OXYBUTYNIN, AS HE WAS SUPPOSED TO TAKE THIS.    I CONTACTED [facility] AND SPOKE TO MARIIA.  SHE SAID IT REQUIRES A PA THROUGH [facility].  SHE WILL FAX THE REQUEST AGAIN.

## 2023-11-29 ENCOUNTER — TELEPHONE (OUTPATIENT)
Dept: UROLOGY | Facility: CLINIC | Age: 58
End: 2023-11-29
Payer: OTHER GOVERNMENT

## 2023-11-29 DIAGNOSIS — R39.15 URGENCY OF URINATION: ICD-10-CM

## 2023-11-29 DIAGNOSIS — N32.81 OVERACTIVE BLADDER: Primary | ICD-10-CM

## 2023-11-29 NOTE — TELEPHONE ENCOUNTER
Called and spoke with pt after verifying his name and . Informed pt that the PA request for the medication Gemtesa 75mg has been denied by the insurance company and will not be covered. Pt informed me that he is out of his samples and has been out for a few days and he is going out of town tomorrow and was wondering if we could send in a prescription for something different or come to pick more samples up so that he does not have urinary urgency during his trip. Checked with Renetta who stated that she would prescribe him myrbetriq and he can come to pick the samples up in the [place] office today.

## 2023-11-29 NOTE — TELEPHONE ENCOUNTER
Returned call to patient to inform him that myrbetriq has been ordered and sent to the [facility] pharmacy that is on file. Verified allergies. Instructed pt that he can go to the [place] office today to  samples of this medication before his trip so that we have time to complete the PA request once it comes through. Pt verbalized understanding.

## 2023-12-05 ENCOUNTER — OFFICE VISIT (OUTPATIENT)
Dept: UROLOGY | Facility: CLINIC | Age: 58
End: 2023-12-05
Payer: OTHER GOVERNMENT

## 2023-12-05 VITALS
SYSTOLIC BLOOD PRESSURE: 143 MMHG | HEIGHT: 70 IN | BODY MASS INDEX: 32.24 KG/M2 | WEIGHT: 225.2 LBS | DIASTOLIC BLOOD PRESSURE: 97 MMHG

## 2023-12-05 DIAGNOSIS — N32.81 OVERACTIVE BLADDER: ICD-10-CM

## 2023-12-05 DIAGNOSIS — R39.15 URGENCY OF URINATION: Primary | ICD-10-CM

## 2023-12-05 RX ORDER — NEOMYCIN SULFATE, POLYMYXIN B SULFATE, AND DEXAMETHASONE 3.5; 10000; 1 MG/G; [USP'U]/G; MG/G
OINTMENT OPHTHALMIC
COMMUNITY
Start: 2023-11-09

## 2023-12-12 ENCOUNTER — TELEPHONE (OUTPATIENT)
Dept: FAMILY MEDICINE CLINIC | Facility: CLINIC | Age: 58
End: 2023-12-12
Payer: OTHER GOVERNMENT

## 2023-12-12 NOTE — TELEPHONE ENCOUNTER
Pt will contact neuro for this referral. Dr. Humphries was last to order/place referral. PCP was not the one to order.

## 2023-12-12 NOTE — TELEPHONE ENCOUNTER
Pt needing  auth for infusion received at Neuro: infusion he gets for Ocrevus IV in January at [facility].

## 2023-12-18 ENCOUNTER — SPECIALTY PHARMACY (OUTPATIENT)
Dept: ONCOLOGY | Facility: HOSPITAL | Age: 58
End: 2023-12-18
Payer: OTHER GOVERNMENT

## 2023-12-20 ENCOUNTER — TELEPHONE (OUTPATIENT)
Dept: SURGERY | Facility: CLINIC | Age: 58
End: 2023-12-20
Payer: OTHER GOVERNMENT

## 2023-12-20 NOTE — TELEPHONE ENCOUNTER
"Patient called and said the request for Mirabegron 25 mg was denied. Patient states that the reasoning was, \"EGFR is greater than or equal to 15 ml. Cannot be authorized at this time.\" Patient would like to see what his other options are for medications. [facility] CB #[phone].   "

## 2023-12-20 NOTE — TELEPHONE ENCOUNTER
Spoke to pt.  We will try to get the PA done again for the pt.  In the meantime pt can  samples in the [place] office.  I will get these ready for him.  Pt is aware.

## 2023-12-21 DIAGNOSIS — R39.15 URGENCY OF URINATION: Primary | ICD-10-CM

## 2023-12-21 DIAGNOSIS — N32.81 OVERACTIVE BLADDER: ICD-10-CM

## 2023-12-21 NOTE — TELEPHONE ENCOUNTER
Noted. I have spoken with pt. He has spoken with specialty pharm and they have got medication taken care of and sent out to pt- clr   General Sunscreen Counseling: I recommended a broad spectrum sunscreen with an SPF of 30 or higher.  I explained that SPF 30 sunscreens block approximately 97 percent of the sun's harmful rays.  Sunscreens should be applied at least 15 minutes prior to expected sun exposure and then every 2 hours after that as long as sun exposure continues. If swimming or exercising sunscreen should be reapplied every 45 minutes to an hour after getting wet or sweating.  One ounce, or the equivalent of a shot glass full of sunscreen, is adequate to protect the skin not covered by a bathing suit. I also recommended a lip balm with an SPF 15 or higher sunscreen as well. Sun protective clothing can be used in lieu of sunscreen but must be worn the entire time you are exposed to the sun's rays. Detail Level: Detailed

## 2024-01-12 ENCOUNTER — TRANSCRIBE ORDERS (OUTPATIENT)
Dept: ADMINISTRATIVE | Facility: HOSPITAL | Age: 59
End: 2024-01-12
Payer: OTHER GOVERNMENT

## 2024-01-12 DIAGNOSIS — G35 MULTIPLE SCLEROSIS: Primary | ICD-10-CM

## 2024-01-23 ENCOUNTER — TELEPHONE (OUTPATIENT)
Dept: UROLOGY | Facility: CLINIC | Age: 59
End: 2024-01-23
Payer: OTHER GOVERNMENT

## 2024-01-23 DIAGNOSIS — R39.15 URGENCY OF URINATION: Primary | ICD-10-CM

## 2024-01-23 NOTE — TELEPHONE ENCOUNTER
PATIENT CALLED AND SAID THE MIRABEGRON 25 MG IS NOT WORKING WELL ENOUGH FOR HIM.  HE CANNOT MAKE IT THROUGH THE NIGHT WITHOUT HAVING URGENCY.      HE ASKED IF IT CAN BE INCREASED TO 50 MG AND SENT TO [facility].    DOES HE NEED AN APPOINTMENT FIRST?  HE HAS A F/U IN MARCH.

## 2024-01-30 ENCOUNTER — HOSPITAL ENCOUNTER (OUTPATIENT)
Dept: INFUSION THERAPY | Facility: HOSPITAL | Age: 59
Discharge: HOME | End: 2024-01-30
Admitting: PSYCHIATRY & NEUROLOGY
Payer: OTHER GOVERNMENT

## 2024-01-30 VITALS
WEIGHT: 224.43 LBS | DIASTOLIC BLOOD PRESSURE: 81 MMHG | BODY MASS INDEX: 32.13 KG/M2 | RESPIRATION RATE: 20 BRPM | HEIGHT: 70 IN | TEMPERATURE: 98.2 F | HEART RATE: 88 BPM | OXYGEN SATURATION: 96 % | SYSTOLIC BLOOD PRESSURE: 138 MMHG

## 2024-01-30 DIAGNOSIS — G35 MULTIPLE SCLEROSIS: Primary | ICD-10-CM

## 2024-01-30 PROCEDURE — 96366 THER/PROPH/DIAG IV INF ADDON: CPT

## 2024-01-30 PROCEDURE — 96365 THER/PROPH/DIAG IV INF INIT: CPT

## 2024-01-30 PROCEDURE — 25810000003 SODIUM CHLORIDE 0.9 % SOLUTION 500 ML FLEX CONT: Performed by: PSYCHIATRY & NEUROLOGY

## 2024-01-30 PROCEDURE — 25010000002 DIPHENHYDRAMINE PER 50 MG: Performed by: PSYCHIATRY & NEUROLOGY

## 2024-01-30 PROCEDURE — 25010000002 OCRELIZUMAB 300 MG/10ML SOLUTION 300 MG VIAL: Performed by: PSYCHIATRY & NEUROLOGY

## 2024-01-30 PROCEDURE — 96375 TX/PRO/DX INJ NEW DRUG ADDON: CPT

## 2024-01-30 PROCEDURE — 25010000002 METHYLPREDNISOLONE PER 125 MG: Performed by: PSYCHIATRY & NEUROLOGY

## 2024-01-30 RX ORDER — ACETAMINOPHEN 325 MG/1
650 TABLET ORAL ONCE
OUTPATIENT
Start: 2024-07-23

## 2024-01-30 RX ORDER — DIPHENHYDRAMINE HYDROCHLORIDE 50 MG/ML
25 INJECTION INTRAMUSCULAR; INTRAVENOUS ONCE
OUTPATIENT
Start: 2024-07-23

## 2024-01-30 RX ORDER — METHYLPREDNISOLONE SODIUM SUCCINATE 125 MG/2ML
100 INJECTION, POWDER, LYOPHILIZED, FOR SOLUTION INTRAMUSCULAR; INTRAVENOUS ONCE
OUTPATIENT
Start: 2024-07-23

## 2024-01-30 RX ORDER — DIPHENHYDRAMINE HYDROCHLORIDE 50 MG/ML
25 INJECTION INTRAMUSCULAR; INTRAVENOUS ONCE
Status: COMPLETED | OUTPATIENT
Start: 2024-01-30 | End: 2024-01-30

## 2024-01-30 RX ORDER — MEPERIDINE HYDROCHLORIDE 50 MG/ML
25 INJECTION INTRAMUSCULAR; INTRAVENOUS; SUBCUTANEOUS
OUTPATIENT
Start: 2024-07-23

## 2024-01-30 RX ORDER — SODIUM CHLORIDE 9 MG/ML
250 INJECTION, SOLUTION INTRAVENOUS ONCE
Status: DISCONTINUED | OUTPATIENT
Start: 2024-01-30 | End: 2024-02-01 | Stop reason: HOSPADM

## 2024-01-30 RX ORDER — METHYLPREDNISOLONE SODIUM SUCCINATE 125 MG/2ML
100 INJECTION, POWDER, LYOPHILIZED, FOR SOLUTION INTRAMUSCULAR; INTRAVENOUS ONCE
Status: COMPLETED | OUTPATIENT
Start: 2024-01-30 | End: 2024-01-30

## 2024-01-30 RX ORDER — ACETAMINOPHEN 325 MG/1
650 TABLET ORAL EVERY 6 HOURS PRN
OUTPATIENT
Start: 2024-07-23

## 2024-01-30 RX ORDER — DIPHENHYDRAMINE HYDROCHLORIDE 50 MG/ML
50 INJECTION INTRAMUSCULAR; INTRAVENOUS AS NEEDED
OUTPATIENT
Start: 2024-07-23

## 2024-01-30 RX ORDER — IBUPROFEN 400 MG/1
400 TABLET ORAL EVERY 6 HOURS PRN
OUTPATIENT
Start: 2024-07-23

## 2024-01-30 RX ORDER — SODIUM CHLORIDE 9 MG/ML
250 INJECTION, SOLUTION INTRAVENOUS ONCE
OUTPATIENT
Start: 2024-07-23

## 2024-01-30 RX ORDER — ACETAMINOPHEN 325 MG/1
650 TABLET ORAL ONCE
Status: COMPLETED | OUTPATIENT
Start: 2024-01-30 | End: 2024-01-30

## 2024-01-30 RX ORDER — FAMOTIDINE 10 MG/ML
20 INJECTION, SOLUTION INTRAVENOUS AS NEEDED
OUTPATIENT
Start: 2024-07-23

## 2024-01-30 RX ADMIN — ACETAMINOPHEN 650 MG: 325 TABLET ORAL at 09:58

## 2024-01-30 RX ADMIN — OCRELIZUMAB 600 MG: 300 INJECTION INTRAVENOUS at 10:12

## 2024-01-30 RX ADMIN — METHYLPREDNISOLONE SODIUM SUCCINATE 100 MG: 125 INJECTION INTRAMUSCULAR; INTRAVENOUS at 09:57

## 2024-01-30 RX ADMIN — DIPHENHYDRAMINE HYDROCHLORIDE 25 MG: 50 INJECTION, SOLUTION INTRAMUSCULAR; INTRAVENOUS at 09:58

## 2024-01-31 ENCOUNTER — TRANSCRIBE ORDERS (OUTPATIENT)
Dept: ADMINISTRATIVE | Facility: HOSPITAL | Age: 59
End: 2024-01-31
Payer: OTHER GOVERNMENT

## 2024-01-31 DIAGNOSIS — G35 MULTIPLE SCLEROSIS: Primary | ICD-10-CM

## 2024-02-08 ENCOUNTER — TELEPHONE (OUTPATIENT)
Dept: NEUROSURGERY | Facility: CLINIC | Age: 59
End: 2024-02-08
Payer: OTHER GOVERNMENT

## 2024-02-08 NOTE — TELEPHONE ENCOUNTER
Called patient. Informed patient that  will be out of office on 03/08/2024. Rescheduled patient for 03/15/2024 at 10:30 am.

## 2024-02-22 ENCOUNTER — OFFICE VISIT (OUTPATIENT)
Dept: FAMILY MEDICINE CLINIC | Facility: CLINIC | Age: 59
End: 2024-02-22
Payer: OTHER GOVERNMENT

## 2024-02-22 VITALS
WEIGHT: 229.8 LBS | HEART RATE: 120 BPM | OXYGEN SATURATION: 97 % | BODY MASS INDEX: 32.9 KG/M2 | DIASTOLIC BLOOD PRESSURE: 78 MMHG | SYSTOLIC BLOOD PRESSURE: 129 MMHG | HEIGHT: 70 IN

## 2024-02-22 DIAGNOSIS — F41.9 ANXIETY: ICD-10-CM

## 2024-02-22 DIAGNOSIS — G62.9 NEUROPATHY: ICD-10-CM

## 2024-02-22 DIAGNOSIS — G35 MS (MULTIPLE SCLEROSIS): ICD-10-CM

## 2024-02-22 DIAGNOSIS — N52.9 ERECTILE DYSFUNCTION, UNSPECIFIED ERECTILE DYSFUNCTION TYPE: ICD-10-CM

## 2024-02-22 DIAGNOSIS — G43.909 MIGRAINE WITHOUT STATUS MIGRAINOSUS, NOT INTRACTABLE, UNSPECIFIED MIGRAINE TYPE: ICD-10-CM

## 2024-02-22 DIAGNOSIS — E78.5 HYPERLIPIDEMIA, UNSPECIFIED HYPERLIPIDEMIA TYPE: ICD-10-CM

## 2024-02-22 DIAGNOSIS — F32.A DEPRESSION, UNSPECIFIED DEPRESSION TYPE: ICD-10-CM

## 2024-02-22 DIAGNOSIS — I10 PRIMARY HYPERTENSION: Primary | ICD-10-CM

## 2024-02-22 PROCEDURE — 99214 OFFICE O/P EST MOD 30 MIN: CPT | Performed by: NURSE PRACTITIONER

## 2024-02-22 NOTE — PROGRESS NOTES
Chief Complaint  Follow-up (Pt doing well with no issues or concerns at this time. Pt is good on medication refills and doing well with medications. Pt aware of vaccines needed to be completed. ), Hypertension, Anxiety, Depression, Migraine, Erectile Dysfunction, and Neurologic Problem (/), Depression, migraine, MS, ED, Neuropathy    Subjective            Jai Dow presents to [facility]  History of Present Illness  Pt is here for 6 month f/u on HTN, Anxiety, Depression, migraines, MS, ED and Neuropathy.    [facility] dermatitis on nury hands.    [facility].    PT is followed by Neurology for MS, migraine, neuropathy med mgmt.    Pt is due labs/orders placed.        Past Medical History:   Diagnosis Date    Acoustic neuroma 8/8/2023    Cluster headache 2003    [facility]    Difficulty walking 2018    Knee replacements    Dizziness 1990 and 2003    Deployments to [place] and [place]    Head injury 2001    In the [employer] in the back of a track vehicle    Headache, tension-type 2003    [facility]    Hypertension 2006    Memory loss 2001    Increasing, memory loss Family members names, co-workers, recent projects    Migraine     Migraine     Multiple sclerosis 12/22/2022    PTSD (post-traumatic stress disorder)     Sleep apnea     Stop using the machine/felt like I couldn’t breath  during nightmares    Tinnitus     1990 and 2003    Vision loss 2022    Blurred       Allergies   Allergen Reactions    Amoxil [Amoxicillin] Angioedema        Past Surgical History:   Procedure Laterality Date    COLONOSCOPY  09/2015    COLONOSCOPY N/A 8/7/2023    Procedure: COLONOSCOPY;  Surgeon: Alexis Bazzi MD;  Location: [facility] ENDOSCOPY;  Service: Gastroenterology;  Laterality: N/A;  HEMORRHOIDS    KNEE ACL RECONSTRUCTION      MENISCECTOMY      REPLACEMENT TOTAL KNEE Bilateral         Social History     Tobacco Use    Smoking status: Former      Packs/day: 0.50     Years: 10.00     Additional pack years: 0.00     Total pack years: 5.00     Types: Cigarettes     Start date: 3/31/1980     Quit date: 6/10/1990     Years since quittin.7     Passive exposure: Past    Smokeless tobacco: Never   Substance Use Topics    Alcohol use: Not Currently     Alcohol/week: 5.0 - 9.0 standard drinks of alcohol     Types: 1 - 2 Cans of beer, 4 - 7 Shots of liquor per week     Comment: Drank everyday after the [place] War       Family History   Problem Relation Age of Onset    Dementia Mother     Stroke Father     Hypertension Father     Osteoarthritis Father     Stroke Brother     Migraines Brother         Current Outpatient Medications on File Prior to Visit   Medication Sig    amitriptyline (ELAVIL) 25 MG tablet Take 2 tablets by mouth Every Night.    atenolol (TENORMIN) 50 MG tablet Take 1 tablet by mouth Daily.    atorvastatin (LIPITOR) 10 MG tablet Take 1 tablet by mouth Daily.    clobetasol (TEMOVATE) 0.05 % ointment     cyclobenzaprine (FLEXERIL) 10 MG tablet Take 1 tablet by mouth 3 (Three) Times a Day As Needed.    Dalfampridine ER 10 MG tablet sustained-release 12 hour Take 10 mg by mouth 2 (Two) Times a Day.    Emollient (AQUAPHOR OINTMENT BODY EX) Apply  topically.    erythromycin (ROMYCIN) 5 MG/GM ophthalmic ointment Administer  to the right eye Every Night.    Fremanezumab-vfrm (Ajovy) 225 MG/1.5ML solution auto-injector Inject 225 mg under the skin into the appropriate area as directed Every 28 (Twenty-Eight) Days.    hydroCHLOROthiazide (HYDRODIURIL) 25 MG tablet Take 1 tablet by mouth Daily.    hydrocortisone 2.5 % cream     ibuprofen (ADVIL,MOTRIN) 800 MG tablet ibuprofen 800 mg oral tablet take 1 tablet (800 mg) by oral route 3 times per day with food   Active    lisinopril (PRINIVIL,ZESTRIL) 40 MG tablet Take 1 tablet by mouth Daily.    Mirabegron ER (Myrbetriq) 50 MG tablet sustained-release 24 hour 24 hr tablet Take 50 mg by mouth Daily.    mupirocin  "(BACTROBAN) 2 % ointment Apply  topically to the appropriate area as directed 3 (Three) Times a Day. apply topically to the affected area three times daily    naproxen sodium (ANAPROX) 275 MG tablet Take 1 tablet by mouth 2 (Two) Times a Day With Meals.    neomycin-polymyxin-dexamethamethasone (POLYDEX) 3.5-53750-7.1 ointment ophthalmic ointment APPLY TO RIGHT EYELID THREE TIMES DAILY FOR 2 WEEKS THEN STOP    Ocrelizumab (OCREVUS IV)     Omega-3 Fatty Acids (fish oil) 1000 MG capsule capsule Take 1 capsule by mouth Daily With Breakfast.    prazosin (MINIPRESS) 2 MG capsule Take 1 capsule by mouth Daily.    pregabalin (Lyrica) 150 MG capsule Take 1 capsule by mouth 2 (Two) Times a Day.    Rimegepant Sulfate (Nurtec) 75 MG tablet dispersible tablet Take 1 tablet by mouth Take As Directed.    sertraline (ZOLOFT) 100 MG tablet 2 tablets.    sildenafil (Viagra) 25 MG tablet Take 1 tablet by mouth Daily As Needed for Erectile Dysfunction.    urea (CARMOL) 20 % cream Apply 1 Application topically to the appropriate area as directed As Needed for Dry Skin.    ondansetron (ZOFRAN) 4 MG tablet Zofran 4 mg oral tablet take 1 tablet by oral route PRN for migraines   Active (Patient not taking: Reported on 2/22/2024)     Current Facility-Administered Medications on File Prior to Visit   Medication    Fremanezumab-vfrm solution prefilled syringe 225 mg       There are no preventive care reminders to display for this patient.      Objective     /78   Pulse 120   Ht 176.5 cm (69.5\")   Wt 104 kg (229 lb 12.8 oz)   SpO2 97%   BMI 33.45 kg/m²       Physical Exam  Constitutional:       General: He is not in acute distress.     Appearance: Normal appearance. He is not ill-appearing.   HENT:      Head: Normocephalic and atraumatic.      Right Ear: Tympanic membrane, ear canal and external ear normal.      Left Ear: Tympanic membrane, ear canal and external ear normal.      Nose: Nose normal.   Cardiovascular:      Rate and " Rhythm: Normal rate and regular rhythm.      Heart sounds: Normal heart sounds. No murmur heard.  Pulmonary:      Effort: Pulmonary effort is normal. No respiratory distress.      Breath sounds: Normal breath sounds.   Chest:      Chest wall: No tenderness.   Abdominal:      General: Abdomen is flat. Bowel sounds are normal. There is no distension.      Palpations: Abdomen is soft. There is no mass.      Tenderness: There is no abdominal tenderness. There is no guarding.   Musculoskeletal:         General: No swelling or tenderness. Normal range of motion.      Cervical back: Normal range of motion and neck supple.   Skin:     General: Skin is warm and dry.      Findings: No rash.      Comments: Scabs and open areas noted to nury hands   Neurological:      General: No focal deficit present.      Mental Status: He is alert and oriented to person, place, and time. Mental status is at baseline.      Gait: Gait normal.   Psychiatric:         Mood and Affect: Mood normal.         Behavior: Behavior normal.         Thought Content: Thought content normal.         Judgment: Judgment normal.       BMI is >= 30 and <35. (Class 1 Obesity). The following options were offered after discussion;: exercise counseling/recommendations and nutrition counseling/recommendations       Result Review :                           Assessment and Plan        Diagnoses and all orders for this visit:    1. Primary hypertension (Primary)  Comments:  stable on Tenormin 50mg, lisinopril 40mg and HCTZ 25mg, continue  Orders:  -     CBC w AUTO Differential; Future    2. Hyperlipidemia, unspecified hyperlipidemia type  Comments:  stable on Lipitor 10mg. continue  Orders:  -     Lipid panel; Future    3. Anxiety  Comments:  stable on zoloft 100mg, continue, [facility]    4. Migraine without status migrainosus, not intractable, unspecified migraine type  Comments:  stable on ajovy and nurtec 75mg, continue, continue f/u with  Neurology for mgmt    5. Erectile dysfunction, unspecified erectile dysfunction type  Comments:  stable on viagra 25mg, continue    6. Depression, unspecified depression type  Comments:  stable on zoloft 100mg and Elavil 25mg, continue, continue [facility] for [facility]    7. Neuropathy  Comments:  stable on Lyrica 150mg, continue, continue f/u with Dr. Yao, Neurology for MGMT    8. MS (multiple sclerosis)  Comments:  Continue f/u with Dr. Yao, Neurology for mgmt              Follow Up     Return in about 6 months (around 8/22/2024).    Patient was given instructions and counseling regarding his condition or for health maintenance advice. Please see specific information pulled into the AVS if appropriate.     Jai Dow  reports that he quit smoking about 33 years ago. His smoking use included cigarettes. He started smoking about 43 years ago. He has a 5.00 pack-year smoking history. He has been exposed to tobacco smoke. He has never used smokeless tobacco..   SARATH Suresh

## 2024-03-05 ENCOUNTER — OFFICE VISIT (OUTPATIENT)
Dept: UROLOGY | Facility: CLINIC | Age: 59
End: 2024-03-05
Payer: OTHER GOVERNMENT

## 2024-03-05 ENCOUNTER — LAB (OUTPATIENT)
Dept: LAB | Facility: HOSPITAL | Age: 59
End: 2024-03-05
Payer: OTHER GOVERNMENT

## 2024-03-05 VITALS — BODY MASS INDEX: 32.1 KG/M2 | HEIGHT: 70 IN | WEIGHT: 224.2 LBS

## 2024-03-05 DIAGNOSIS — R39.15 URINARY URGENCY: ICD-10-CM

## 2024-03-05 DIAGNOSIS — E78.5 HYPERLIPIDEMIA, UNSPECIFIED HYPERLIPIDEMIA TYPE: ICD-10-CM

## 2024-03-05 DIAGNOSIS — I10 PRIMARY HYPERTENSION: ICD-10-CM

## 2024-03-05 DIAGNOSIS — N32.81 OVERACTIVE BLADDER: Primary | ICD-10-CM

## 2024-03-05 LAB
BILIRUB BLD-MCNC: NEGATIVE MG/DL
CLARITY, POC: CLEAR
COLOR UR: YELLOW
EXPIRATION DATE: ABNORMAL
GLUCOSE UR STRIP-MCNC: NEGATIVE MG/DL
KETONES UR QL: NEGATIVE
LEUKOCYTE EST, POC: NEGATIVE
Lab: ABNORMAL
NITRITE UR-MCNC: NEGATIVE MG/ML
PH UR: 6 [PH] (ref 5–8)
PROT UR STRIP-MCNC: NEGATIVE MG/DL
RBC # UR STRIP: ABNORMAL /UL
SP GR UR: 1.03 (ref 1–1.03)
URINE VOLUME: 71
UROBILINOGEN UR QL: ABNORMAL

## 2024-03-05 PROCEDURE — 85025 COMPLETE CBC W/AUTO DIFF WBC: CPT

## 2024-03-05 PROCEDURE — 36415 COLL VENOUS BLD VENIPUNCTURE: CPT

## 2024-03-05 PROCEDURE — 80061 LIPID PANEL: CPT

## 2024-03-05 RX ORDER — AMANTADINE HYDROCHLORIDE 100 MG/1
100 CAPSULE, GELATIN COATED ORAL 2 TIMES DAILY
COMMUNITY

## 2024-03-05 RX ORDER — DONEPEZIL HYDROCHLORIDE 23 MG/1
23 TABLET, FILM COATED ORAL 2 TIMES DAILY
COMMUNITY

## 2024-03-05 RX ORDER — SULFAMETHOXAZOLE AND TRIMETHOPRIM 800; 160 MG/1; MG/1
TABLET ORAL
Qty: 6 TABLET | Refills: 0 | Status: SHIPPED | OUTPATIENT
Start: 2024-03-05

## 2024-03-05 NOTE — PROGRESS NOTES
"Chief Complaint: Urinary Urgency (Pt is here today for a follow up. Pt is taking myrbetriq 50mg and [facility]terodine [facility]mg [facility]) and Difficulty Urinating (Pt reports burning and tingling in his legs up to his hands every time he urinates. )    Subjective         History of Present Illness  Jai Dow is a 59 y.o. male presents to [facility] to be seen for f/u urinary urgency.    The patient has been getting a \"burning tingling sensation\" when he voids.     He is on myrbetriq 50mg q day though [facility] and [facility] started hm on tolterodine 2 weeks ago.    He reports that his stream is good.    He is still with urgency.    He states that he has been having little small cuts all over his hands and body.      Previous:     At last visit we started him on gemtesa.     He states that the gemtesa was not covered.     He was started on myrbetriq due to gemtesa not being covered.     He states the gemtesa was working well. He was having less leakage.    He states his stream is better.     He is stil having leakge in the AM.    He is still with urgency and frequency during the day and night.    [facility] gem[facility]a [facility].    He has been on myrbetriq for 1 week at this time.     He states that he is with urgency and still with urge incontinence.    He sates he is having constipation.    Still with weak and split stream.    He began last summer with neurologic symptoms and  has been going through testing. He he was DX with ms.    He states that that he has symptoms that come and go with urinary urgncy and frequency.     He reports nocturia x 2-3.     Stream is split and weak at times.     He does have to strain to void at times.     Some hesitancy.     He was placed on oxynutynin by dr. Vela 1 week ago.        Objective     Past Medical History:   Diagnosis Date    Acoustic neuroma 8/8/2023    Cluster headache 2003    [employer]    Difficulty walking 2018 "    Knee replacements    Dizziness 1990 and 2003    Deployments to [place] and [place]    Head injury 2001    In the [employer] in the back of a track vehicle    Headache, tension-type 2003    [employer]    Hypertension 2006    Memory loss 2001    Increasing, memory loss Family members names, co-workers, recent projects    Migraine     Migraine     Multiple sclerosis 12/22/2022    PTSD (post-traumatic stress disorder)     Sleep apnea     Stop using the machine/felt like I couldn’t breath  during nightmares    Tinnitus     1990 and 2003    Vision loss 2022    Blurred       Past Surgical History:   Procedure Laterality Date    COLONOSCOPY  09/2015    COLONOSCOPY N/A 8/7/2023    Procedure: COLONOSCOPY;  Surgeon: Alexis Bazzi MD;  Location: [facility] ENDOSCOPY;  Service: Gastroenterology;  Laterality: N/A;  HEMORRHOIDS    KNEE ACL RECONSTRUCTION      MENISCECTOMY      REPLACEMENT TOTAL KNEE Bilateral          Current Outpatient Medications:     amantadine (SYMMETREL) 100 MG capsule, Take 1 capsule by mouth 2 (Two) Times a Day., Disp: , Rfl:     amitriptyline (ELAVIL) 25 MG tablet, Take 2 tablets by mouth Every Night., Disp: , Rfl:     atenolol (TENORMIN) 50 MG tablet, Take 1 tablet by mouth Daily., Disp: , Rfl:     atorvastatin (LIPITOR) 10 MG tablet, Take 1 tablet by mouth Daily., Disp: , Rfl:     clobetasol (TEMOVATE) 0.05 % ointment, , Disp: , Rfl:     cyclobenzaprine (FLEXERIL) 10 MG tablet, Take 1 tablet by mouth 3 (Three) Times a Day As Needed., Disp: , Rfl:     Dalfampridine ER 10 MG tablet sustained-release 12 hour, Take 10 mg by mouth 2 (Two) Times a Day., Disp: 180 tablet, Rfl: 3    donepezil (ARICEPT) 23 MG tablet, Take 1 tablet by mouth 2 (Two) Times a Day., Disp: , Rfl:     Emollient (AQUAPHOR OINTMENT BODY EX), Apply  topically., Disp: , Rfl:     Fremanezumab-vfrm (Ajovy) 225 MG/1.5ML solution auto-injector, Inject 225 mg under the skin into the appropriate area as directed Every 28 (Twenty-Eight) Days., Disp: 4.5  mL, Rfl: 3    hydroCHLOROthiazide (HYDRODIURIL) 25 MG tablet, Take 1 tablet by mouth Daily., Disp: 90 tablet, Rfl: 1    hydrocortisone 2.5 % cream, , Disp: , Rfl:     lisinopril (PRINIVIL,ZESTRIL) 40 MG tablet, Take 1 tablet by mouth Daily., Disp: 90 tablet, Rfl: 1    Mirabegron ER (Myrbetriq) 50 MG tablet sustained-release 24 hour 24 hr tablet, Take 50 mg by mouth Daily., Disp: 90 tablet, Rfl: 3    mupirocin (BACTROBAN) 2 % ointment, Apply  topically to the appropriate area as directed 3 (Three) Times a Day. apply topically to the affected area three times daily, Disp: , Rfl:     naproxen sodium (ANAPROX) 275 MG tablet, Take 1 tablet by mouth 2 (Two) Times a Day With Meals., Disp: , Rfl:     Ocrelizumab (OCREVUS IV), , Disp: , Rfl:     Omega-3 Fatty Acids (fish oil) 1000 MG capsule capsule, Take 1 capsule by mouth Daily With Breakfast., Disp: , Rfl:     ondansetron (ZOFRAN) 4 MG tablet, , Disp: , Rfl:     prazosin (MINIPRESS) 2 MG capsule, Take 1 capsule by mouth Daily., Disp: , Rfl:     pregabalin (Lyrica) 150 MG capsule, Take 1 capsule by mouth 2 (Two) Times a Day., Disp: 60 capsule, Rfl: 2    Rimegepant Sulfate (Nurtec) 75 MG tablet dispersible tablet, Take 1 tablet by mouth Take As Directed., Disp: , Rfl:     sertraline (ZOLOFT) 100 MG tablet, 2 tablets., Disp: , Rfl:     sildenafil (Viagra) 25 MG tablet, Take 1 tablet by mouth Daily As Needed for Erectile Dysfunction., Disp: 30 tablet, Rfl: 1    urea (CARMOL) 20 % cream, Apply 1 Application topically to the appropriate area as directed As Needed for Dry Skin., Disp: , Rfl:     sulfamethoxazole-trimethoprim (Bactrim DS) 800-160 MG per tablet, 1 tab by mouth 2 x a day starting the day before urodynamic test, Disp: 6 tablet, Rfl: 0    Current Facility-Administered Medications:     Fremanezumab-vfrm solution prefilled syringe 225 mg, 225 mg, Subcutaneous, Once, Oropilla, Leopoldo Ruth MD    Allergies   Allergen Reactions    Amoxil [Amoxicillin] Angioedema     "    Family History   Problem Relation Age of Onset    Dementia Mother     Stroke Father     Hypertension Father     Osteoarthritis Father     Stroke Brother     Migraines Brother        Social History     Socioeconomic History    Marital status:    Tobacco Use    Smoking status: Former     Current packs/day: 0.00     Average packs/day: 0.5 packs/day for 10.2 years (5.1 ttl pk-yrs)     Types: Cigarettes     Start date: 3/31/1980     Quit date: 6/10/1990     Years since quittin.7     Passive exposure: Past    Smokeless tobacco: Never   Vaping Use    Vaping status: Never Used   Substance and Sexual Activity    Alcohol use: Not Currently     Alcohol/week: 5.0 - 9.0 standard drinks of alcohol     Types: 1 - 2 Cans of beer, 4 - 7 Shots of liquor per week     Comment: Drank everyday after the [place] War    Drug use: Never    Sexual activity: Yes     Partners: Female     Birth control/protection: None       Vital Signs:   Ht 176.5 cm (69.5\")   Wt 102 kg (224 lb 3.2 oz)   BMI 32.63 kg/m²      Physical Exam     Result Review :   The following data was reviewed by: SARATH Gottlieb on 3/5/2024:  Results for orders placed or performed in visit on 24   Bladder Scan   Result Value Ref Range    Urine Volume 71    POC Urinalysis Dipstick, Automated    Specimen: Urine   Result Value Ref Range    Color Yellow Yellow, Straw, Dark Yellow, Mariely    Clarity, UA Clear Clear    Specific Gravity  1.030 1.005 - 1.030    pH, Urine 6.0 5.0 - 8.0    Leukocytes Negative Negative    Nitrite, UA Negative Negative    Protein, POC Negative Negative mg/dL    Glucose, UA Negative Negative mg/dL    Ketones, UA Negative Negative    Urobilinogen, UA 0.2 E.U./dL Normal, 0.2 E.U./dL    Bilirubin Negative Negative    Blood, UA Trace (A) Negative    Lot Number [id number]     Expiration Date        PSA          2023    09:49   PSA   PSA 1.520        Bladder Scan interpretation 2024    Estimation of residual urine via " BVI 3000 [address]athon Bladder Scan  MA/nurse performing: juju johnson Rn  Residual Urine: 71 ml  Indication: Overactive bladder    Urinary urgency   Position: Supine  Examination: Incremental scanning of the suprapubic area using 2.0 MHz transducer using copious amounts of acoustic gel.   Findings: An anechoic area was demonstrated which represented the bladder, with measurement of residual urine as noted. I inspected this myself. In that the residual urine was stable or insignificant, refer to plan for treatment and plan necessary at this time.             Procedures        Assessment and Plan    Diagnoses and all orders for this visit:    1. Overactive bladder (Primary)  -     POC Urinalysis Dipstick, Automated  -     Bladder Scan  -     Cystometrogram; Future  -     sulfamethoxazole-trimethoprim (Bactrim DS) 800-160 MG per tablet; 1 tab by mouth 2 x a day starting the day before urodynamic test  Dispense: 6 tablet; Refill: 0    2. Urinary urgency        Did discuss with the patient that I do not believe that it is beneficial for him to be on tolterodine and Myrbetriq at the same time as this can increase the risk for urinary retention.    Given patient's symptoms that are refractory to medications thus far I would like to go ahead and proceed with a urodynamics test to delineate underlying etiology of his urinary symptoms and potentially formulate a better plan of care.        I spent 10  minutes caring for Jai on this date of service. This time includes time spent by me in the following activities:reviewing tests, obtaining and/or reviewing a separately obtained history, performing a medically appropriate examination and/or evaluation , counseling and educating the patient/family/caregiver, ordering medications, tests, or procedures, and documenting information in the medical record  Follow Up   Return for UDS f/u 2 weeks after.  Patient was given instructions and counseling regarding his condition or for health  maintenance advice. Please see specific information pulled into the AVS if appropriate.         This document has been electronically signed by SARATH Gottlieb  March 5, 2024 13:10 EST

## 2024-03-06 ENCOUNTER — HOSPITAL ENCOUNTER (OUTPATIENT)
Dept: MRI IMAGING | Facility: HOSPITAL | Age: 59
Discharge: HOME | End: 2024-03-06
Admitting: NEUROLOGICAL SURGERY
Payer: OTHER GOVERNMENT

## 2024-03-06 DIAGNOSIS — I10 PRIMARY HYPERTENSION: Primary | ICD-10-CM

## 2024-03-06 DIAGNOSIS — D33.3 ACOUSTIC NEUROMA: ICD-10-CM

## 2024-03-06 LAB
BASOPHILS # BLD AUTO: 0.1 10*3/MM3 (ref 0–0.2)
BASOPHILS NFR BLD AUTO: 2 % (ref 0–1.5)
CHOLEST SERPL-MCNC: 178 MG/DL (ref 0–200)
CREAT BLDA-MCNC: 0.9 MG/DL (ref 0.6–1.3)
DEPRECATED RDW RBC AUTO: 38.5 FL (ref 37–54)
EGFRCR SERPLBLD CKD-EPI 2021: 97.2 ML/MIN/1.73
EOSINOPHIL # BLD AUTO: 0.1 10*3/MM3 (ref 0–0.4)
EOSINOPHIL NFR BLD AUTO: 2 % (ref 0.3–6.2)
ERYTHROCYTE [DISTWIDTH] IN BLOOD BY AUTOMATED COUNT: 12.9 % (ref 12.3–15.4)
HCT VFR BLD AUTO: 40.7 % (ref 37.5–51)
HDLC SERPL-MCNC: 36 MG/DL (ref 40–60)
HGB BLD-MCNC: 13.2 G/DL (ref 13–17.7)
IMM GRANULOCYTES # BLD AUTO: 0.02 10*3/MM3 (ref 0–0.05)
IMM GRANULOCYTES NFR BLD AUTO: 0.4 % (ref 0–0.5)
LDLC SERPL CALC-MCNC: 124 MG/DL (ref 0–100)
LDLC/HDLC SERPL: 3.4 {RATIO}
LYMPHOCYTES # BLD AUTO: 1.2 10*3/MM3 (ref 0.7–3.1)
LYMPHOCYTES NFR BLD AUTO: 24.4 % (ref 19.6–45.3)
MCH RBC QN AUTO: 26.8 PG (ref 26.6–33)
MCHC RBC AUTO-ENTMCNC: 32.4 G/DL (ref 31.5–35.7)
MCV RBC AUTO: 82.7 FL (ref 79–97)
MONOCYTES # BLD AUTO: 0.61 10*3/MM3 (ref 0.1–0.9)
MONOCYTES NFR BLD AUTO: 12.4 % (ref 5–12)
NEUTROPHILS NFR BLD AUTO: 2.88 10*3/MM3 (ref 1.7–7)
NEUTROPHILS NFR BLD AUTO: 58.8 % (ref 42.7–76)
NRBC BLD AUTO-RTO: 0 /100 WBC (ref 0–0.2)
PLATELET # BLD AUTO: 226 10*3/MM3 (ref 140–450)
PMV BLD AUTO: 11.8 FL (ref 6–12)
RBC # BLD AUTO: 4.92 10*6/MM3 (ref 4.14–5.8)
TRIGL SERPL-MCNC: 98 MG/DL (ref 0–150)
VLDLC SERPL-MCNC: 18 MG/DL (ref 5–40)
WBC NRBC COR # BLD AUTO: 4.91 10*3/MM3 (ref 3.4–10.8)

## 2024-03-06 PROCEDURE — A9577 INJ MULTIHANCE: HCPCS | Performed by: NEUROLOGICAL SURGERY

## 2024-03-06 PROCEDURE — 0 GADOBENATE DIMEGLUMINE 529 MG/ML SOLUTION: Performed by: NEUROLOGICAL SURGERY

## 2024-03-06 PROCEDURE — 82565 ASSAY OF CREATININE: CPT

## 2024-03-06 PROCEDURE — 70553 MRI BRAIN STEM W/O & W/DYE: CPT

## 2024-03-06 RX ORDER — LISINOPRIL 40 MG/1
40 TABLET ORAL DAILY
Qty: 90 TABLET | Refills: 1 | Status: SHIPPED | OUTPATIENT
Start: 2024-03-06

## 2024-03-06 RX ORDER — HYDROCHLOROTHIAZIDE 25 MG/1
25 TABLET ORAL DAILY
Qty: 90 TABLET | Refills: 1 | Status: SHIPPED | OUTPATIENT
Start: 2024-03-06

## 2024-03-06 RX ADMIN — GADOBENATE DIMEGLUMINE 20 ML: 529 INJECTION, SOLUTION INTRAVENOUS at 09:55

## 2024-03-07 DIAGNOSIS — I10 PRIMARY HYPERTENSION: ICD-10-CM

## 2024-03-07 RX ORDER — HYDROCHLOROTHIAZIDE 25 MG/1
25 TABLET ORAL DAILY
Qty: 90 TABLET | Refills: 1 | OUTPATIENT
Start: 2024-03-07

## 2024-03-18 DIAGNOSIS — I10 PRIMARY HYPERTENSION: ICD-10-CM

## 2024-03-18 RX ORDER — LISINOPRIL 40 MG/1
40 TABLET ORAL DAILY
Qty: 90 TABLET | Refills: 1 | OUTPATIENT
Start: 2024-03-18

## 2024-03-22 ENCOUNTER — TELEPHONE (OUTPATIENT)
Dept: UROLOGY | Facility: CLINIC | Age: 59
End: 2024-03-22
Payer: OTHER GOVERNMENT

## 2024-03-22 NOTE — TELEPHONE ENCOUNTER
"PER CONCEPCION, \"Concepcion Dent RN Fugate, Robin, RegSched Rep; Louie Javier, RegLexyed Rep  [facility],    This pt is needing to be scheduled for his UDS. This is one that was waiting for more dates.    Thank you,    Concepcion Dent RN  CALLED PT, NO ANSWER, LMOM.  "

## 2024-03-25 NOTE — TELEPHONE ENCOUNTER
SPOKE TO PT AND SCHEDULED HIS UDS AND F/U WITH RYANNE/CAYETANO   Detail Level: Zone Photo Preface (Leave Blank If You Do Not Want): Photographs were obtained today

## 2024-05-10 ENCOUNTER — PROCEDURE VISIT (OUTPATIENT)
Dept: UROLOGY | Facility: CLINIC | Age: 59
End: 2024-05-10
Payer: OTHER GOVERNMENT

## 2024-05-10 DIAGNOSIS — R39.12 WEAK URINARY STREAM: Primary | ICD-10-CM

## 2024-06-03 ENCOUNTER — OFFICE VISIT (OUTPATIENT)
Dept: UROLOGY | Facility: CLINIC | Age: 59
End: 2024-06-03
Payer: OTHER GOVERNMENT

## 2024-06-03 VITALS
SYSTOLIC BLOOD PRESSURE: 161 MMHG | WEIGHT: 224 LBS | BODY MASS INDEX: 32.07 KG/M2 | DIASTOLIC BLOOD PRESSURE: 93 MMHG | HEART RATE: 70 BPM | HEIGHT: 70 IN

## 2024-06-03 DIAGNOSIS — R39.198 SLOW URINARY STREAM: ICD-10-CM

## 2024-06-03 DIAGNOSIS — N32.81 OVERACTIVE BLADDER: Primary | ICD-10-CM

## 2024-06-03 LAB
BILIRUB BLD-MCNC: NEGATIVE MG/DL
CLARITY, POC: CLEAR
COLOR UR: YELLOW
EXPIRATION DATE: ABNORMAL
GLUCOSE UR STRIP-MCNC: NEGATIVE MG/DL
KETONES UR QL: NEGATIVE
LEUKOCYTE EST, POC: ABNORMAL
Lab: ABNORMAL
NITRITE UR-MCNC: NEGATIVE MG/ML
PH UR: 6 [PH] (ref 5–8)
PROT UR STRIP-MCNC: NEGATIVE MG/DL
RBC # UR STRIP: ABNORMAL /UL
SP GR UR: 1.01 (ref 1–1.03)
URINE VOLUME: 115
UROBILINOGEN UR QL: ABNORMAL

## 2024-06-03 PROCEDURE — 99214 OFFICE O/P EST MOD 30 MIN: CPT | Performed by: NURSE PRACTITIONER

## 2024-06-03 PROCEDURE — 51798 US URINE CAPACITY MEASURE: CPT | Performed by: NURSE PRACTITIONER

## 2024-06-03 PROCEDURE — 81003 URINALYSIS AUTO W/O SCOPE: CPT | Performed by: NURSE PRACTITIONER

## 2024-06-03 RX ORDER — SULFAMETHOXAZOLE AND TRIMETHOPRIM 800; 160 MG/1; MG/1
TABLET ORAL
Qty: 6 TABLET | Refills: 0 | Status: SHIPPED | OUTPATIENT
Start: 2024-06-03

## 2024-06-03 RX ORDER — SULFAMETHOXAZOLE AND TRIMETHOPRIM 800; 160 MG/1; MG/1
TABLET ORAL
Qty: 6 TABLET | Refills: 0 | Status: SHIPPED | OUTPATIENT
Start: 2024-06-03 | End: 2024-06-03

## 2024-06-03 NOTE — PROGRESS NOTES
"Chief Complaint: Overactive bladder    Subjective         History of Present Illness  Jai Dow is a 59 y.o. male presents to [facility] UR[facility] to be seen for f/u urinary urgency.    Patient underwent urodynamics testing on 5/10/2024.    UDS revealed no evidence of stress incontinence at 308 mL.  The patient had detrusor overactivity with incontinence beginning at 308 ml.  The patient had adequate bladder emptying with low rate flow positive detrusor pressure and with minimal abdominal straining.    Flow charted unobstructed.    Maximum flow 16 mL/s    PVR was 50 mL    The patient continues on Myrbetriq 50 mg daily.    PVR today is 115    He states he has a burning sensation into his feet when he voids.    Urgency is still an issue. He is wearing depends.        Previous:    The patient has been getting a \"burning tingling sensation\" when he voids.     He is on myrbetriq 50mg q day though [facility] 2 weeks ago.    He reports that his stream is good.    He is still with urgency.    He states that he has been having little small cuts all over his hands and body.    At last visit we started him on gemtesa.     He states that the gemtesa was not covered.     He was started on myrbetriq due to gemtesa not being covered.     He states the gemtesa was working well. He was having less leakage.    He states his stream is better.     He is stil having leakge in the AM.    He is still with urgency and frequency during the day and night.    He will be [facility] to [facility] to get gemtesa [facility].    He has been on myrbetriq for 1 week at this time.     He states that he is with urgency and still with urge incontinence.    He sates he is having constipation.    Still with weak and split stream.    He began last summer with neurologic symptoms and  has been going through testing. He he was DX with ms.    He states that that he has symptoms that come and go with urinary " urgncy and frequency.     He reports nocturia x 2-3.     Stream is split and weak at times.     He does have to strain to void at times.     Some hesitancy.     He was placed on oxynutynin by dr. Vela 1 week ago.        Objective     Past Medical History:   Diagnosis Date    Acoustic neuroma 08/08/2023    Cluster headache 2003    [employer]    Difficulty walking 2018    Knee replacements    Dizziness 1990 and 2003    Deployments to [place] and [place]    Erectile dysfunction June 2022    Taking Sildenafil    Head injury 2001    In the [employer] in the back of a track vehicle    Headache, tension-type 2003    [employer]    Hypertension 2006    Memory loss 2001    Increasing, memory loss Family members names, co-workers, recent projects    Migraine     Migraine     Multiple sclerosis 12/22/2022    PTSD (post-traumatic stress disorder)     Sleep apnea     Stop using the machine/felt like I couldn’t breath  during nightmares    Tinnitus     1990 and 2003    Urinary incontinence 2023    Taking  Oxybutynin    Vision loss 2022    Blurred       Past Surgical History:   Procedure Laterality Date    COLONOSCOPY  09/2015    COLONOSCOPY N/A 08/07/2023    Procedure: COLONOSCOPY;  Surgeon: Alexis Bazzi MD;  Location: [facility] ENDOSCOPY;  Service: Gastroenterology;  Laterality: N/A;  HEMORRHOIDS    KNEE ACL RECONSTRUCTION      MENISCECTOMY      REPLACEMENT TOTAL KNEE Bilateral     VASECTOMY  June 2004         Current Outpatient Medications:     sulfamethoxazole-trimethoprim (Bactrim DS) 800-160 MG per tablet, 1 tab by mouth twice a day until gone starting the day before urology procedure, Disp: 6 tablet, Rfl: 0    amantadine (SYMMETREL) 100 MG capsule, Take 1 capsule by mouth 2 (Two) Times a Day., Disp: , Rfl:     amitriptyline (ELAVIL) 25 MG tablet, Take 2 tablets by mouth Every Night., Disp: , Rfl:     atenolol (TENORMIN) 50 MG tablet, Take 1 tablet by mouth Daily., Disp: , Rfl:     atorvastatin (LIPITOR) 10 MG tablet, Take 1 tablet by  mouth Daily., Disp: , Rfl:     clobetasol (TEMOVATE) 0.05 % ointment, , Disp: , Rfl:     cyclobenzaprine (FLEXERIL) 10 MG tablet, Take 1 tablet by mouth 3 (Three) Times a Day As Needed., Disp: , Rfl:     Dalfampridine ER 10 MG tablet sustained-release 12 hour, Take 10 mg by mouth 2 (Two) Times a Day., Disp: 180 tablet, Rfl: 3    donepezil (ARICEPT) 23 MG tablet, Take 1 tablet by mouth 2 (Two) Times a Day., Disp: , Rfl:     Emollient (AQUAPHOR OINTMENT BODY EX), Apply  topically., Disp: , Rfl:     Fremanezumab-vfrm (Ajovy) 225 MG/1.5ML solution auto-injector, Inject 225 mg under the skin into the appropriate area as directed Every 28 (Twenty-Eight) Days., Disp: 4.5 mL, Rfl: 3    hydroCHLOROthiazide 25 MG tablet, Take 1 tablet by mouth Daily., Disp: 90 tablet, Rfl: 1    hydrocortisone 2.5 % cream, , Disp: , Rfl:     lisinopril (PRINIVIL,ZESTRIL) 40 MG tablet, Take 1 tablet by mouth Daily., Disp: 90 tablet, Rfl: 1    Mirabegron ER (Myrbetriq) 50 MG tablet sustained-release 24 hour 24 hr tablet, Take 50 mg by mouth Daily., Disp: 90 tablet, Rfl: 3    mupirocin (BACTROBAN) 2 % ointment, Apply  topically to the appropriate area as directed 3 (Three) Times a Day. apply topically to the affected area three times daily, Disp: , Rfl:     naproxen sodium (ANAPROX) 275 MG tablet, Take 1 tablet by mouth 2 (Two) Times a Day With Meals., Disp: , Rfl:     Ocrelizumab (OCREVUS IV), , Disp: , Rfl:     Omega-3 Fatty Acids (fish oil) 1000 MG capsule capsule, Take 1 capsule by mouth Daily With Breakfast., Disp: , Rfl:     ondansetron (ZOFRAN) 4 MG tablet, , Disp: , Rfl:     prazosin (MINIPRESS) 2 MG capsule, Take 1 capsule by mouth Daily., Disp: , Rfl:     pregabalin (Lyrica) 150 MG capsule, Take 1 capsule by mouth 2 (Two) Times a Day., Disp: 60 capsule, Rfl: 2    Rimegepant Sulfate (Nurtec) 75 MG tablet dispersible tablet, Take 1 tablet by mouth Take As Directed., Disp: , Rfl:     sertraline (ZOLOFT) 100 MG tablet, 2 tablets., Disp: ,  "Rfl:     sildenafil (Viagra) 25 MG tablet, Take 1 tablet by mouth Daily As Needed for Erectile Dysfunction., Disp: 30 tablet, Rfl: 1    urea (CARMOL) 20 % cream, Apply 1 Application topically to the appropriate area as directed As Needed for Dry Skin., Disp: , Rfl:     Current Facility-Administered Medications:     Fremanezumab-vfrm solution prefilled syringe 225 mg, 225 mg, Subcutaneous, Once, Oropilla, Leopoldo Ruth MD    Allergies   Allergen Reactions    Amoxil [Amoxicillin] Angioedema        Family History   Problem Relation Age of Onset    Dementia Mother     Cancer Mother         Breast Cancer    Stroke Father     Hypertension Father     Osteoarthritis Father     Stroke Brother     Migraines Brother        Social History     Socioeconomic History    Marital status:    Tobacco Use    Smoking status: Former     Current packs/day: 0.00     Average packs/day: 0.5 packs/day for 10.2 years (5.1 ttl pk-yrs)     Types: Cigarettes     Start date: 3/31/1980     Quit date: 6/10/1990     Years since quittin.0     Passive exposure: Past    Smokeless tobacco: Never   Vaping Use    Vaping status: Never Used   Substance and Sexual Activity    Alcohol use: Not Currently     Alcohol/week: 5.0 - 9.0 standard drinks of alcohol     Types: 1 - 2 Cans of beer, 4 - 7 Shots of liquor per week     Comment: Drank everyday after the [place] War,  [place] and [place]    Drug use: Never    Sexual activity: Yes     Partners: Female     Birth control/protection: None, Vasectomy     Comment: N/A       Vital Signs:   /93 (BP Location: Left arm, Patient Position: Sitting, Cuff Size: Adult)   Pulse 70   Ht 176.5 cm (69.5\")   Wt 102 kg (224 lb)   BMI 32.60 kg/m²      Physical Exam     Result Review :   The following data was reviewed by: SARATH Gottlieb on 6/3/2024:  Results for orders placed or performed in visit on 24   Bladder Scan   Result Value Ref Range    Urine Volume 115    POC Urinalysis Dipstick, Automated "    Specimen: Urine   Result Value Ref Range    Color Yellow Yellow, Straw, Dark Yellow, Mariely    Clarity, UA Clear Clear    Specific Gravity  1.015 1.005 - 1.030    pH, Urine 6.0 5.0 - 8.0    Leukocytes Trace (A) Negative    Nitrite, UA Negative Negative    Protein, POC Negative Negative mg/dL    Glucose, UA Negative Negative mg/dL    Ketones, UA Negative Negative    Urobilinogen, UA 0.2 E.U./dL Normal, 0.2 E.U./dL    Bilirubin Negative Negative    Blood, UA Trace (A) Negative    Lot Number [id number]     Expiration Date 2/2,025       PSA          7/27/2023    09:49   PSA   PSA 1.520         Bladder Scan interpretation 06/03/2024    Estimation of residual urine via Mural.lyI 3000 [address] Bladder Scan  MA/nurse performing: rubén short Ma   Residual Urine: 115 ml  Indication: Overactive bladder    Slow urinary stream   Position: Supine  Examination: Incremental scanning of the suprapubic area using 2.0 MHz transducer using copious amounts of acoustic gel.   Findings: An anechoic area was demonstrated which represented the bladder, with measurement of residual urine as noted. I inspected this myself. In that the residual urine was stable or insignificant, refer to plan for treatment and plan necessary at this time.             Procedures        Assessment and Plan    Diagnoses and all orders for this visit:    1. Overactive bladder (Primary)  -     POC Urinalysis Dipstick, Automated  -     Bladder Scan  -     Discontinue: sulfamethoxazole-trimethoprim (Bactrim DS) 800-160 MG per tablet; 1 tab by mouth twice a day until gone starting the day before urology procedure  Dispense: 6 tablet; Refill: 0  -     Cystoscopy; Future  -     sulfamethoxazole-trimethoprim (Bactrim DS) 800-160 MG per tablet; 1 tab by mouth twice a day until gone starting the day before urology procedure  Dispense: 6 tablet; Refill: 0    2. Slow urinary stream  -     Discontinue: sulfamethoxazole-trimethoprim (Bactrim DS) 800-160 MG per tablet; 1 tab by mouth  twice a day until gone starting the day before urology procedure  Dispense: 6 tablet; Refill: 0  -     Cystoscopy; Future  -     sulfamethoxazole-trimethoprim (Bactrim DS) 800-160 MG per tablet; 1 tab by mouth twice a day until gone starting the day before urology procedure  Dispense: 6 tablet; Refill: 0      Given UDS findings I did discuss having the patient set up for cysto to evaluate lower urinary tract to see if he would benefit from an outlet procedure or if potentially lower urinary tract issues may be causing some of his urinary urgency and frequency given his low flow rate.    For now we will go ahead and continue him on Myrbetriq.        I spent 10  minutes caring for Jai on this date of service. This time includes time spent by me in the following activities:reviewing tests, obtaining and/or reviewing a separately obtained history, performing a medically appropriate examination and/or evaluation , counseling and educating the patient/family/caregiver, ordering medications, tests, or procedures, and documenting information in the medical record  Follow Up   Return for after cysto .  Patient was given instructions and counseling regarding his condition or for health maintenance advice. Please see specific information pulled into the AVS if appropriate.         This document has been electronically signed by SARATH Gottlieb  Fannie 3, 2024 10:04 EDT

## 2024-06-19 ENCOUNTER — TELEPHONE (OUTPATIENT)
Dept: FAMILY MEDICINE CLINIC | Facility: CLINIC | Age: 59
End: 2024-06-19
Payer: OTHER GOVERNMENT

## 2024-06-19 NOTE — TELEPHONE ENCOUNTER
S/W pt spouse.  referral for infusion will need to be placed by Dr. Vela's office. Will contact their office for updated referral.

## 2024-06-19 NOTE — TELEPHONE ENCOUNTER
Caller: HAM DAN    Relationship: Emergency Contact    Best call back number:     [phone]      What is the medical concern/diagnosis: INFUSION TREATMENT FOR MS     What specialty or service is being requested: NEUROLOGY    What is the provider, practice or medical service name: DR MARK ESPARZA    What is the office location:   [address] [address]     What is the office phone number: [phone]     Any additional details: PATIENT'S WIFE STATES THAT THE PATIENT'S CURRENT REFERRAL IS ONLY GOOD UNTIL 7.16.24 AND THE OFFICE IS CURRENTLY BOOKED OUT UNTIL THE END OF JULY.     SHE ALSO STATES THAT THIS REFERRAL IS DIFFERENT THAT THE ONE THE PATIENT HAS FOR OFFICE VISITS TO [facility].

## 2024-07-27 PROBLEM — R39.198 SLOW URINARY STREAM: Status: ACTIVE | Noted: 2024-07-27

## 2024-07-27 PROBLEM — N32.81 OVERACTIVE BLADDER: Status: ACTIVE | Noted: 2024-07-27

## 2024-07-27 NOTE — PROGRESS NOTES
Procedures       Urinalysis was checked today and was negative for signs of infection      Cytoscopy Procedure:     Procedure: Flexible cytoscope was passed per urethra into the bladder without difficulty after proper consent. The bladder was inspected in a systematic meridian fashion.       2 cm prostate, no median lobe    Moderate trabeculation throughout.  No pathology      There were no tumors, lesions, stones, or other abnormalities noted within the bladder. Of note, there was no increased vascularity as well. Both ureteral orifices were identified and were normal in appearance. The flexible cytoscope was removed. The patient tolerated the procedure well.             Urgency incontinence  MS      5/24 UDS - strong desire 155, max 308 .max detrusor pressure 26.  DO with incontinence 308.  Q-[place] 16, PVR 50.  No abdominal straining.    Myrbetriq 50 mg daily -helped some of the time.    Insurance would not pay for Gemtesa  Does have to wear a pad now  [facility]    Sildenafil 25 mg  Urgency, wears pads  Slow stream  Burning sensation in his feet when he voids    3/24 0.9, GFR 97    PVR    6/24   115         PSA    7/23    1.5         Urge incontinence    Continue Myrbetriq    Start Flomax 0.4 mg daily    Follow-up in 3 months    PVR follow-up      This document has been electronically signed by Marko Anderson MD  July 27, 2024 06:38 EDT

## 2024-07-29 ENCOUNTER — PROCEDURE VISIT (OUTPATIENT)
Dept: UROLOGY | Facility: CLINIC | Age: 59
End: 2024-07-29
Payer: OTHER GOVERNMENT

## 2024-07-29 DIAGNOSIS — R39.198 SLOW URINARY STREAM: ICD-10-CM

## 2024-07-29 DIAGNOSIS — N32.81 OVERACTIVE BLADDER: Primary | ICD-10-CM

## 2024-07-29 PROCEDURE — 52000 CYSTOURETHROSCOPY: CPT | Performed by: UROLOGY

## 2024-07-29 RX ORDER — TAMSULOSIN HYDROCHLORIDE 0.4 MG/1
1 CAPSULE ORAL DAILY
Qty: 90 CAPSULE | Refills: 4 | Status: SHIPPED | OUTPATIENT
Start: 2024-07-29

## 2024-07-30 ENCOUNTER — HOSPITAL ENCOUNTER (OUTPATIENT)
Dept: INFUSION THERAPY | Facility: HOSPITAL | Age: 59
Discharge: HOME | End: 2024-07-30
Admitting: PSYCHIATRY & NEUROLOGY
Payer: OTHER GOVERNMENT

## 2024-07-30 VITALS
TEMPERATURE: 97.9 F | RESPIRATION RATE: 20 BRPM | BODY MASS INDEX: 33.28 KG/M2 | DIASTOLIC BLOOD PRESSURE: 82 MMHG | OXYGEN SATURATION: 95 % | SYSTOLIC BLOOD PRESSURE: 136 MMHG | WEIGHT: 228.62 LBS | HEART RATE: 71 BPM

## 2024-07-30 DIAGNOSIS — G35 MULTIPLE SCLEROSIS: Primary | ICD-10-CM

## 2024-07-30 PROCEDURE — 96415 CHEMO IV INFUSION ADDL HR: CPT

## 2024-07-30 PROCEDURE — 96413 CHEMO IV INFUSION 1 HR: CPT

## 2024-07-30 PROCEDURE — 25810000003 SODIUM CHLORIDE 0.9 % SOLUTION 500 ML FLEX CONT: Performed by: PSYCHIATRY & NEUROLOGY

## 2024-07-30 PROCEDURE — 96365 THER/PROPH/DIAG IV INF INIT: CPT

## 2024-07-30 PROCEDURE — 25010000002 METHYLPREDNISOLONE PER 125 MG: Performed by: PSYCHIATRY & NEUROLOGY

## 2024-07-30 PROCEDURE — 96374 THER/PROPH/DIAG INJ IV PUSH: CPT

## 2024-07-30 PROCEDURE — 96366 THER/PROPH/DIAG IV INF ADDON: CPT

## 2024-07-30 PROCEDURE — 25010000002 OCRELIZUMAB 300 MG/10ML SOLUTION 300 MG VIAL: Performed by: PSYCHIATRY & NEUROLOGY

## 2024-07-30 PROCEDURE — 25010000002 DIPHENHYDRAMINE PER 50 MG: Performed by: PSYCHIATRY & NEUROLOGY

## 2024-07-30 PROCEDURE — 96375 TX/PRO/DX INJ NEW DRUG ADDON: CPT

## 2024-07-30 RX ORDER — FAMOTIDINE 10 MG/ML
20 INJECTION, SOLUTION INTRAVENOUS AS NEEDED
OUTPATIENT
Start: 2025-01-28

## 2024-07-30 RX ORDER — DIPHENHYDRAMINE HYDROCHLORIDE 50 MG/ML
25 INJECTION INTRAMUSCULAR; INTRAVENOUS ONCE
OUTPATIENT
Start: 2025-01-28

## 2024-07-30 RX ORDER — SODIUM CHLORIDE 9 MG/ML
250 INJECTION, SOLUTION INTRAVENOUS ONCE
OUTPATIENT
Start: 2025-01-28

## 2024-07-30 RX ORDER — ACETAMINOPHEN 325 MG/1
650 TABLET ORAL ONCE
OUTPATIENT
Start: 2025-01-28

## 2024-07-30 RX ORDER — IBUPROFEN 400 MG/1
400 TABLET ORAL EVERY 6 HOURS PRN
OUTPATIENT
Start: 2025-01-28

## 2024-07-30 RX ORDER — MEPERIDINE HYDROCHLORIDE 50 MG/ML
25 INJECTION INTRAMUSCULAR; INTRAVENOUS; SUBCUTANEOUS
OUTPATIENT
Start: 2025-01-28

## 2024-07-30 RX ORDER — ACETAMINOPHEN 325 MG/1
650 TABLET ORAL ONCE
Status: COMPLETED | OUTPATIENT
Start: 2024-07-30 | End: 2024-07-30

## 2024-07-30 RX ORDER — SODIUM CHLORIDE 9 MG/ML
250 INJECTION, SOLUTION INTRAVENOUS ONCE
Status: DISCONTINUED | OUTPATIENT
Start: 2024-07-30 | End: 2024-08-01 | Stop reason: HOSPADM

## 2024-07-30 RX ORDER — ACETAMINOPHEN 325 MG/1
650 TABLET ORAL EVERY 6 HOURS PRN
OUTPATIENT
Start: 2025-01-28

## 2024-07-30 RX ORDER — DIPHENHYDRAMINE HYDROCHLORIDE 50 MG/ML
50 INJECTION INTRAMUSCULAR; INTRAVENOUS AS NEEDED
OUTPATIENT
Start: 2025-01-28

## 2024-07-30 RX ORDER — DIPHENHYDRAMINE HYDROCHLORIDE 50 MG/ML
25 INJECTION INTRAMUSCULAR; INTRAVENOUS ONCE
Status: COMPLETED | OUTPATIENT
Start: 2024-07-30 | End: 2024-07-30

## 2024-07-30 RX ADMIN — DIPHENHYDRAMINE HYDROCHLORIDE 25 MG: 50 INJECTION, SOLUTION INTRAMUSCULAR; INTRAVENOUS at 10:30

## 2024-07-30 RX ADMIN — ACETAMINOPHEN 650 MG: 325 TABLET ORAL at 10:31

## 2024-07-30 RX ADMIN — METHYLPREDNISOLONE SODIUM SUCCINATE 100 MG: 125 INJECTION INTRAMUSCULAR; INTRAVENOUS at 10:31

## 2024-07-30 RX ADMIN — OCRELIZUMAB 600 MG: 300 INJECTION INTRAVENOUS at 10:34

## 2024-08-19 ENCOUNTER — LAB (OUTPATIENT)
Dept: LAB | Facility: HOSPITAL | Age: 59
End: 2024-08-19
Payer: OTHER GOVERNMENT

## 2024-08-19 ENCOUNTER — OFFICE VISIT (OUTPATIENT)
Dept: FAMILY MEDICINE CLINIC | Facility: CLINIC | Age: 59
End: 2024-08-19
Payer: OTHER GOVERNMENT

## 2024-08-19 VITALS
HEART RATE: 60 BPM | WEIGHT: 225 LBS | BODY MASS INDEX: 32.21 KG/M2 | DIASTOLIC BLOOD PRESSURE: 83 MMHG | HEIGHT: 70 IN | OXYGEN SATURATION: 98 % | SYSTOLIC BLOOD PRESSURE: 133 MMHG

## 2024-08-19 DIAGNOSIS — Z00.00 ANNUAL PHYSICAL EXAM: ICD-10-CM

## 2024-08-19 DIAGNOSIS — E78.5 HYPERLIPIDEMIA, UNSPECIFIED HYPERLIPIDEMIA TYPE: ICD-10-CM

## 2024-08-19 DIAGNOSIS — F41.9 ANXIETY: ICD-10-CM

## 2024-08-19 DIAGNOSIS — Z12.5 SCREENING FOR PROSTATE CANCER: ICD-10-CM

## 2024-08-19 DIAGNOSIS — G62.9 NEUROPATHY: ICD-10-CM

## 2024-08-19 DIAGNOSIS — F32.A DEPRESSION, UNSPECIFIED DEPRESSION TYPE: ICD-10-CM

## 2024-08-19 DIAGNOSIS — Z13.29 SCREENING FOR THYROID DISORDER: ICD-10-CM

## 2024-08-19 DIAGNOSIS — G35 MS (MULTIPLE SCLEROSIS): ICD-10-CM

## 2024-08-19 DIAGNOSIS — Z00.00 ANNUAL PHYSICAL EXAM: Primary | ICD-10-CM

## 2024-08-19 DIAGNOSIS — I10 PRIMARY HYPERTENSION: ICD-10-CM

## 2024-08-19 DIAGNOSIS — N52.9 ERECTILE DYSFUNCTION, UNSPECIFIED ERECTILE DYSFUNCTION TYPE: ICD-10-CM

## 2024-08-19 DIAGNOSIS — G43.909 MIGRAINE WITHOUT STATUS MIGRAINOSUS, NOT INTRACTABLE, UNSPECIFIED MIGRAINE TYPE: ICD-10-CM

## 2024-08-19 DIAGNOSIS — N40.0 BENIGN PROSTATIC HYPERPLASIA, UNSPECIFIED WHETHER LOWER URINARY TRACT SYMPTOMS PRESENT: ICD-10-CM

## 2024-08-19 LAB
PSA SERPL-MCNC: 1.74 NG/ML (ref 0–4)
TSH SERPL DL<=0.05 MIU/L-ACNC: 0.8 UIU/ML (ref 0.27–4.2)

## 2024-08-19 PROCEDURE — 84443 ASSAY THYROID STIM HORMONE: CPT

## 2024-08-19 PROCEDURE — 36415 COLL VENOUS BLD VENIPUNCTURE: CPT

## 2024-08-19 PROCEDURE — G0103 PSA SCREENING: HCPCS

## 2024-08-19 PROCEDURE — 99396 PREV VISIT EST AGE 40-64: CPT | Performed by: NURSE PRACTITIONER

## 2024-08-19 RX ORDER — ONDANSETRON 4 MG/1
TABLET, ORALLY DISINTEGRATING ORAL
COMMUNITY

## 2024-08-19 RX ORDER — GABAPENTIN 300 MG/1
300 CAPSULE ORAL
COMMUNITY

## 2024-08-19 NOTE — PROGRESS NOTES
Chief Complaint  Annual Exam, Hyperlipidemia, Hypertension, Anxiety, Depression, Migraine, and Multiple Sclerosis, neuropathy, ED, BPH    Subjective            Jai Dow presents to [facility]  History of Present Illness  Pt is an annual CPE for HTN, HLD, depression, anxiety, migraines, and MS, neuropathy, ED, BPH. No issues or concerns at this time.     Pt is due labs/orders placed.    Pt is followed by Dr. Marie with neurology.    Pt is followed by Dr. Anderson with urology.     [facility] as [facility].         Past Medical History:   Diagnosis Date    Acoustic neuroma 08/08/2023    Cluster headache 2003    [employer]    Difficulty walking 2018    Knee replacements    Dizziness 1990 and 2003    Deployments to [place] and [place]    Erectile dysfunction June 2022    Taking Sildenafil    Head injury 2001    In the [employer] in the back of a track vehicle    Headache, tension-type 2003    [facility]    Hypertension 2006    Memory loss 2001    Increasing, memory loss Family members names, co-workers, recent projects    Migraine     Migraine     Multiple sclerosis 12/22/2022    PTSD (post-traumatic stress disorder)     Sleep apnea     Stop using the machine/felt like I couldn’t breath  during nightmares    Tinnitus     1990 and 2003    Urinary incontinence 2023    Taking  Oxybutynin    Vision loss 2022    Blurred       Allergies   Allergen Reactions    Amoxil [Amoxicillin] Angioedema        Past Surgical History:   Procedure Laterality Date    COLONOSCOPY  09/2015    COLONOSCOPY N/A 08/07/2023    Procedure: COLONOSCOPY;  Surgeon: Alexis Bazzi MD;  Location: [facility] ENDOSCOPY;  Service: Gastroenterology;  Laterality: N/A;  HEMORRHOIDS    KNEE ACL RECONSTRUCTION      MENISCECTOMY      REPLACEMENT TOTAL KNEE Bilateral     VASECTOMY  June 2004        Social History     Tobacco Use    Smoking status: Former     Current packs/day: 0.00     Average packs/day: 0.5 packs/day  for 10.2 years (5.1 ttl pk-yrs)     Types: Cigarettes     Start date: 3/31/1980     Quit date: 6/10/1990     Years since quittin.2     Passive exposure: Past    Smokeless tobacco: Never   Substance Use Topics    Alcohol use: Not Currently     Alcohol/week: 5.0 - 9.0 standard drinks of alcohol     Types: 1 - 2 Cans of beer, 4 - 7 Shots of liquor per week     Comment: Drank everyday after the [place] War,  [place] and [place]       Family History   Problem Relation Age of Onset    Dementia Mother     Cancer Mother         Breast Cancer    Stroke Father     Hypertension Father     Osteoarthritis Father     Stroke Brother     Migraines Brother         Current Outpatient Medications on File Prior to Visit   Medication Sig    amantadine (SYMMETREL) 100 MG capsule Take 1 capsule by mouth 2 (Two) Times a Day.    amitriptyline (ELAVIL) 25 MG tablet Take 2 tablets by mouth Every Night.    atenolol (TENORMIN) 50 MG tablet Take 1 tablet by mouth Daily.    atorvastatin (LIPITOR) 10 MG tablet Take 1 tablet by mouth Daily.    clobetasol (TEMOVATE) 0.05 % ointment     cyclobenzaprine (FLEXERIL) 10 MG tablet Take 1 tablet by mouth 3 (Three) Times a Day As Needed.    Dalfampridine ER 10 MG tablet sustained-release 12 hour Take 10 mg by mouth 2 (Two) Times a Day.    donepezil (ARICEPT) 23 MG tablet Take 1 tablet by mouth 2 (Two) Times a Day.    Emollient (AQUAPHOR OINTMENT BODY EX) Apply  topically.    Fremanezumab-vfrm (Ajovy) 225 MG/1.5ML solution auto-injector Inject 225 mg under the skin into the appropriate area as directed Every 28 (Twenty-Eight) Days.    gabapentin (NEURONTIN) 300 MG capsule Take 1 capsule by mouth.    hydroCHLOROthiazide 25 MG tablet Take 1 tablet by mouth Daily.    hydrocortisone 2.5 % cream     lisinopril (PRINIVIL,ZESTRIL) 40 MG tablet Take 1 tablet by mouth Daily.    Magnesium 125 MG capsule     Mirabegron ER (Myrbetriq) 50 MG tablet sustained-release 24 hour 24 hr tablet Take 50 mg by mouth Daily.     "mupirocin (BACTROBAN) 2 % ointment Apply  topically to the appropriate area as directed 3 (Three) Times a Day. apply topically to the affected area three times daily    naproxen sodium (ANAPROX) 275 MG tablet Take 1 tablet by mouth 2 (Two) Times a Day With Meals.    Ocrelizumab (OCREVUS IV)     Omega-3 Fatty Acids (fish oil) 1000 MG capsule capsule Take 1 capsule by mouth Daily With Breakfast.    ondansetron (ZOFRAN) 4 MG tablet     ondansetron ODT (ZOFRAN-ODT) 4 MG disintegrating tablet Place 1 tablet every day by translingual route as needed.    prazosin (MINIPRESS) 2 MG capsule Take 1 capsule by mouth Daily.    pregabalin (Lyrica) 150 MG capsule Take 1 capsule by mouth 2 (Two) Times a Day.    Rimegepant Sulfate (Nurtec) 75 MG tablet dispersible tablet Take 1 tablet by mouth Take As Directed.    sertraline (ZOLOFT) 100 MG tablet 2 tablets.    sildenafil (Viagra) 25 MG tablet Take 1 tablet by mouth Daily As Needed for Erectile Dysfunction.    sulfamethoxazole-trimethoprim (Bactrim DS) 800-160 MG per tablet 1 tab by mouth twice a day until gone starting the day before urology procedure    tamsulosin (FLOMAX) 0.4 MG capsule 24 hr capsule Take 1 capsule by mouth Daily.    urea (CARMOL) 20 % cream Apply 1 Application topically to the appropriate area as directed As Needed for Dry Skin.     Current Facility-Administered Medications on File Prior to Visit   Medication    Fremanezumab-vfrm solution prefilled syringe 225 mg       Health Maintenance Due   Topic Date Due    ANNUAL PHYSICAL  07/27/2024    INFLUENZA VACCINE  08/01/2024       Objective     /83   Pulse 60   Ht 176.5 cm (69.5\")   Wt 102 kg (225 lb)   SpO2 98%   BMI 32.75 kg/m²       Physical Exam  Constitutional:       General: He is not in acute distress.     Appearance: Normal appearance. He is not ill-appearing.   HENT:      Head: Normocephalic and atraumatic.      Right Ear: Tympanic membrane, ear canal and external ear normal.      Left Ear: " Tympanic membrane, ear canal and external ear normal.      Nose: Nose normal.   Cardiovascular:      Rate and Rhythm: Normal rate and regular rhythm.      Heart sounds: Normal heart sounds. No murmur heard.  Pulmonary:      Effort: Pulmonary effort is normal. No respiratory distress.      Breath sounds: Normal breath sounds.   Chest:      Chest wall: No tenderness.   Abdominal:      General: Abdomen is flat. Bowel sounds are normal. There is no distension.      Palpations: Abdomen is soft. There is no mass.      Tenderness: There is no abdominal tenderness. There is no guarding.   Musculoskeletal:         General: No swelling or tenderness. Normal range of motion.      Cervical back: Normal range of motion and neck supple.      Comments: Pt does ambulate with the use of a cane   Skin:     General: Skin is warm and dry.      Findings: No rash.   Neurological:      General: No focal deficit present.      Mental Status: He is alert and oriented to person, place, and time. Mental status is at baseline.      Gait: Gait normal.   Psychiatric:         Attention and Perception: Attention normal.         Mood and Affect: Mood and affect normal.         Speech: Speech normal.         Behavior: Behavior normal. Behavior is cooperative.         Thought Content: Thought content normal. Thought content does not include suicidal ideation.         Judgment: Judgment normal.           Result Review :                           Assessment and Plan        Diagnoses and all orders for this visit:    1. Annual physical exam (Primary)  -     TSH; Future  -     PSA SCREENING; Future    2. Primary hypertension  Comments:  stable on Tenormin 50mg, lisinopril 40mg and HCTZ 25mg, continue    3. Hyperlipidemia, unspecified hyperlipidemia type  Comments:  stableon Lipitor 10mg, continue    4. Migraine without status migrainosus, not intractable, unspecified migraine type  Comments:  stable on Ajovy and Nurtec 75mg prn,  continue    5. Erectile  dysfunction, unspecified erectile dysfunction type  Comments:  stable on Viagra 25mg prn, continue, continue f/u with Urology for mgmt    6. Depression, unspecified depression type  Comments:  stable on Zoloft 100mg, continue    7. Benign prostatic hyperplasia, unspecified whether lower urinary tract symptoms present  Comments:  stable on Flomax 0.4mg, continue, continue f/u with Urology for mgmt    8. Anxiety  Comments:  stable on zoloft 100mg, continue    9. Neuropathy  Comments:  stable on Lyrica 150mg, continue, continue f/uu with Neurology for mgmt    10. MS (multiple sclerosis)  Comments:  continue f/u with Neurologist for mgmt    11. Screening for thyroid disorder  -     TSH; Future    12. Screening for prostate cancer  -     PSA SCREENING; Future      Preventative Counseling:  Healthy diet  Daily exercise  Get adequate sleep        Follow Up     Return in about 6 months (around 2/19/2025).    Patient was given instructions and counseling regarding his condition or for health maintenance advice. Please see specific information pulled into the AVS if appropriate.     Jai MISAEL Dow  reports that he quit smoking about 34 years ago. His smoking use included cigarettes. He started smoking about 44 years ago. He has a 5.1 pack-year smoking history. He has been exposed to tobacco smoke. He has never used smokeless tobacco.

## 2024-10-26 PROBLEM — N39.41 URGE INCONTINENCE: Status: ACTIVE | Noted: 2024-10-26

## 2024-10-26 NOTE — PROGRESS NOTES
Chief Complaint: Urologic complaint    Subjective         History of Present Illness  Jai Dow is a 59 y.o. male          Urge  incontinence  MS         7/24 start Flomax 0.4 mg daily -   helping, better stream, easier to start.  Helping with urge incontinence.  Wearing a safety pad but not leaking is much      Myrbetriq 50 mg daily -helped some of the time.    7/24 cystoscopy - 2 cm prostate, no median lobe, moderate trabeculation.  No pathology     PCP is treating constipation with senna, improving      Sildenafil 25 mg      PVR     10/24   008  6/24     115       Bladder Scan interpretation 10/29/2024    Estimation of residual urine via [employer] 3000 [employer] Bladder Scan  MA/nurse performing: PHUONG Herrera  Residual Urine: 8 ml  Indication: Urge incontinence    Benign prostatic hyperplasia with lower urinary tract symptoms, symptom details unspecified   Position: Supine  Examination: Incremental scanning of the suprapubic area using 2.0 MHz transducer using copious amounts of acoustic gel.   Findings: An anechoic area was demonstrated which represented the bladder, with measurement of residual urine as noted. I inspected this myself. In that the residual urine was stable or insignificant, refer to plan for treatment and plan necessary at this time.            Previous    5/24 UDS - strong desire 155, max 308 .max detrusor pressure 26.  DO with incontinence 308.  Q-[place] 16, PVR 50.  No abdominal straining.        Insurance would not pay for Gemtesa    [facility]     Urgency, wears pads  Slow stream  Burning sensation in his feet when he voids       3/24 0.9, GFR 97           PSA     7/23    1.5             Objective                 Assessment and Plan    Diagnoses and all orders for this visit:    1. Urge incontinence (Primary)        Urge incontinence     Continue Myrbetriq  Cont  Flomax 0.4 mg daily, we did discuss at least creasing the dose, at this time he is doing okay he would like to  leave this alone he does take a lot of pills and he like to try to decrease this    We did brief discuss TURP.  He may be interested in some future.  Risks and benefits were discussed including bleeding, infection and damage to the urinary system.  We also discussed the risk of anesthesia up to and including death.  Patient voiced understanding and would like to proceed.    At this time after discussion of risk and benefits patient is worried about the risk of ED with TURP he will let us know if he is interested in the future     Follow-up with NP in 6 months    PVR follow-up

## 2024-10-29 ENCOUNTER — OFFICE VISIT (OUTPATIENT)
Dept: UROLOGY | Age: 59
End: 2024-10-29
Payer: OTHER GOVERNMENT

## 2024-10-29 VITALS — WEIGHT: 229.4 LBS | RESPIRATION RATE: 18 BRPM | HEIGHT: 70 IN | BODY MASS INDEX: 32.84 KG/M2

## 2024-10-29 DIAGNOSIS — N40.1 BENIGN PROSTATIC HYPERPLASIA WITH LOWER URINARY TRACT SYMPTOMS, SYMPTOM DETAILS UNSPECIFIED: ICD-10-CM

## 2024-10-29 DIAGNOSIS — N39.41 URGE INCONTINENCE: Primary | ICD-10-CM

## 2024-10-29 RX ORDER — HYDROXYZINE HYDROCHLORIDE 10 MG/1
10 TABLET, FILM COATED ORAL NIGHTLY
COMMUNITY
Start: 2024-08-26

## 2025-01-07 ENCOUNTER — TRANSCRIBE ORDERS (OUTPATIENT)
Dept: LAB | Facility: HOSPITAL | Age: 60
End: 2025-01-07
Payer: OTHER GOVERNMENT

## 2025-01-07 DIAGNOSIS — G35 MULTIPLE SCLEROSIS: Primary | ICD-10-CM

## 2025-01-10 ENCOUNTER — TRANSCRIBE ORDERS (OUTPATIENT)
Dept: ADMINISTRATIVE | Facility: HOSPITAL | Age: 60
End: 2025-01-10
Payer: OTHER GOVERNMENT

## 2025-01-10 DIAGNOSIS — G35 MULTIPLE SCLEROSIS: Primary | ICD-10-CM

## 2025-01-13 ENCOUNTER — LAB (OUTPATIENT)
Dept: LAB | Facility: HOSPITAL | Age: 60
End: 2025-01-13
Payer: OTHER GOVERNMENT

## 2025-01-13 DIAGNOSIS — G35 MULTIPLE SCLEROSIS: ICD-10-CM

## 2025-01-13 LAB
ALBUMIN SERPL-MCNC: 4.2 G/DL (ref 3.5–5.2)
ALBUMIN/GLOB SERPL: 1.2 G/DL
ALP SERPL-CCNC: 51 U/L (ref 39–117)
ALT SERPL W P-5'-P-CCNC: 18 U/L (ref 1–41)
ANION GAP SERPL CALCULATED.3IONS-SCNC: 7 MMOL/L (ref 5–15)
AST SERPL-CCNC: 17 U/L (ref 1–40)
BASOPHILS # BLD AUTO: 0.08 10*3/MM3 (ref 0–0.2)
BASOPHILS NFR BLD AUTO: 1.8 % (ref 0–1.5)
BILIRUB SERPL-MCNC: 0.4 MG/DL (ref 0–1.2)
BUN SERPL-MCNC: 10 MG/DL (ref 8–23)
BUN/CREAT SERPL: 9.5 (ref 7–25)
CALCIUM SPEC-SCNC: 9.6 MG/DL (ref 8.6–10.5)
CHLORIDE SERPL-SCNC: 102 MMOL/L (ref 98–107)
CO2 SERPL-SCNC: 28 MMOL/L (ref 22–29)
CREAT SERPL-MCNC: 1.05 MG/DL (ref 0.76–1.27)
DEPRECATED RDW RBC AUTO: 39.5 FL (ref 37–54)
EGFRCR SERPLBLD CKD-EPI 2021: 80.8 ML/MIN/1.73
EOSINOPHIL # BLD AUTO: 0.17 10*3/MM3 (ref 0–0.4)
EOSINOPHIL NFR BLD AUTO: 3.8 % (ref 0.3–6.2)
ERYTHROCYTE [DISTWIDTH] IN BLOOD BY AUTOMATED COUNT: 12.9 % (ref 12.3–15.4)
FOLATE SERPL-MCNC: 5.99 NG/ML (ref 4.78–24.2)
GLOBULIN UR ELPH-MCNC: 3.5 GM/DL
GLUCOSE SERPL-MCNC: 88 MG/DL (ref 65–99)
HCT VFR BLD AUTO: 43.1 % (ref 37.5–51)
HGB BLD-MCNC: 13.9 G/DL (ref 13–17.7)
IMM GRANULOCYTES # BLD AUTO: 0.01 10*3/MM3 (ref 0–0.05)
IMM GRANULOCYTES NFR BLD AUTO: 0.2 % (ref 0–0.5)
LYMPHOCYTES # BLD AUTO: 1.09 10*3/MM3 (ref 0.7–3.1)
LYMPHOCYTES NFR BLD AUTO: 24.3 % (ref 19.6–45.3)
MCH RBC QN AUTO: 27.5 PG (ref 26.6–33)
MCHC RBC AUTO-ENTMCNC: 32.3 G/DL (ref 31.5–35.7)
MCV RBC AUTO: 85.2 FL (ref 79–97)
MONOCYTES # BLD AUTO: 0.73 10*3/MM3 (ref 0.1–0.9)
MONOCYTES NFR BLD AUTO: 16.3 % (ref 5–12)
NEUTROPHILS NFR BLD AUTO: 2.41 10*3/MM3 (ref 1.7–7)
NEUTROPHILS NFR BLD AUTO: 53.6 % (ref 42.7–76)
NRBC BLD AUTO-RTO: 0 /100 WBC (ref 0–0.2)
PLATELET # BLD AUTO: 204 10*3/MM3 (ref 140–450)
PMV BLD AUTO: 12 FL (ref 6–12)
POTASSIUM SERPL-SCNC: 4.1 MMOL/L (ref 3.5–5.2)
PROT SERPL-MCNC: 7.7 G/DL (ref 6–8.5)
RBC # BLD AUTO: 5.06 10*6/MM3 (ref 4.14–5.8)
SODIUM SERPL-SCNC: 137 MMOL/L (ref 136–145)
TSH SERPL DL<=0.05 MIU/L-ACNC: 1.23 UIU/ML (ref 0.27–4.2)
VIT B12 BLD-MCNC: 496 PG/ML (ref 211–946)
WBC NRBC COR # BLD AUTO: 4.49 10*3/MM3 (ref 3.4–10.8)

## 2025-01-13 PROCEDURE — 83921 ORGANIC ACID SINGLE QUANT: CPT

## 2025-01-13 PROCEDURE — 80053 COMPREHEN METABOLIC PANEL: CPT

## 2025-01-13 PROCEDURE — 36415 COLL VENOUS BLD VENIPUNCTURE: CPT

## 2025-01-13 PROCEDURE — 84425 ASSAY OF VITAMIN B-1: CPT

## 2025-01-13 PROCEDURE — 84443 ASSAY THYROID STIM HORMONE: CPT

## 2025-01-13 PROCEDURE — 82746 ASSAY OF FOLIC ACID SERUM: CPT

## 2025-01-13 PROCEDURE — 82607 VITAMIN B-12: CPT

## 2025-01-13 PROCEDURE — 85025 COMPLETE CBC W/AUTO DIFF WBC: CPT

## 2025-01-17 LAB — VIT B1 BLD-SCNC: 110.8 NMOL/L (ref 66.5–200)

## 2025-01-18 LAB — METHYLMALONATE SERPL-SCNC: 155 NMOL/L (ref 0–378)

## 2025-01-24 ENCOUNTER — TELEPHONE (OUTPATIENT)
Dept: PHARMACY | Facility: HOSPITAL | Age: 60
End: 2025-01-24
Payer: OTHER GOVERNMENT

## 2025-01-24 NOTE — TELEPHONE ENCOUNTER
Called and spoke with Nany at [facility]. Informed her patient is scheduled for 01/31 for Ocrevus and need new orders. Also inquired about auth. Said she would work on auth as soon as off phone and fax both as soon as gets auth.

## 2025-01-28 ENCOUNTER — TELEPHONE (OUTPATIENT)
Dept: PHARMACY | Facility: HOSPITAL | Age: 60
End: 2025-01-28
Payer: OTHER GOVERNMENT

## 2025-01-28 NOTE — TELEPHONE ENCOUNTER
Called [facility] and spoke with Natasha. Informed her that we have the auth for the patient, but not the order. She said they still didn't have the auth. Informed her my PA person may have looked it up. She said she would look up the auth and then fax the order to us.

## 2025-01-29 RX ORDER — FAMOTIDINE 10 MG/ML
20 INJECTION, SOLUTION INTRAVENOUS AS NEEDED
Status: CANCELLED | OUTPATIENT
Start: 2025-01-31

## 2025-01-29 RX ORDER — HYDROCORTISONE SODIUM SUCCINATE 100 MG/2ML
100 INJECTION INTRAMUSCULAR; INTRAVENOUS AS NEEDED
Status: CANCELLED | OUTPATIENT
Start: 2025-01-31

## 2025-01-29 RX ORDER — ONDANSETRON 2 MG/ML
4 INJECTION INTRAMUSCULAR; INTRAVENOUS ONCE AS NEEDED
Status: CANCELLED
Start: 2025-01-31

## 2025-01-29 RX ORDER — DIPHENHYDRAMINE HYDROCHLORIDE 50 MG/ML
50 INJECTION INTRAMUSCULAR; INTRAVENOUS AS NEEDED
Status: CANCELLED | OUTPATIENT
Start: 2025-01-31

## 2025-01-29 RX ORDER — MEPERIDINE HYDROCHLORIDE 50 MG/ML
25 INJECTION INTRAMUSCULAR; INTRAVENOUS; SUBCUTANEOUS
Status: CANCELLED | OUTPATIENT
Start: 2025-01-31

## 2025-01-29 RX ORDER — ONDANSETRON 4 MG/1
4 TABLET, ORALLY DISINTEGRATING ORAL
Status: CANCELLED
Start: 2025-01-31

## 2025-01-31 ENCOUNTER — HOSPITAL ENCOUNTER (OUTPATIENT)
Dept: INFUSION THERAPY | Facility: HOSPITAL | Age: 60
Discharge: HOME | End: 2025-01-31
Payer: OTHER GOVERNMENT

## 2025-01-31 VITALS
RESPIRATION RATE: 20 BRPM | DIASTOLIC BLOOD PRESSURE: 87 MMHG | HEIGHT: 70 IN | TEMPERATURE: 97.6 F | OXYGEN SATURATION: 98 % | WEIGHT: 240.3 LBS | SYSTOLIC BLOOD PRESSURE: 148 MMHG | BODY MASS INDEX: 34.4 KG/M2 | HEART RATE: 81 BPM

## 2025-01-31 DIAGNOSIS — G35 MULTIPLE SCLEROSIS: Primary | ICD-10-CM

## 2025-01-31 PROCEDURE — 96366 THER/PROPH/DIAG IV INF ADDON: CPT

## 2025-01-31 PROCEDURE — 96375 TX/PRO/DX INJ NEW DRUG ADDON: CPT

## 2025-01-31 PROCEDURE — 96413 CHEMO IV INFUSION 1 HR: CPT

## 2025-01-31 PROCEDURE — 25810000003 SODIUM CHLORIDE 0.9 % SOLUTION 500 ML FLEX CONT: Performed by: PSYCHIATRY & NEUROLOGY

## 2025-01-31 PROCEDURE — 25010000002 DIPHENHYDRAMINE PER 50 MG: Performed by: PSYCHIATRY & NEUROLOGY

## 2025-01-31 PROCEDURE — 96365 THER/PROPH/DIAG IV INF INIT: CPT

## 2025-01-31 PROCEDURE — 25010000002 OCRELIZUMAB 300 MG/10ML SOLUTION 300 MG VIAL: Performed by: PSYCHIATRY & NEUROLOGY

## 2025-01-31 PROCEDURE — 96415 CHEMO IV INFUSION ADDL HR: CPT

## 2025-01-31 PROCEDURE — 25010000002 METHYLPREDNISOLONE PER 125 MG: Performed by: PSYCHIATRY & NEUROLOGY

## 2025-01-31 PROCEDURE — 96374 THER/PROPH/DIAG INJ IV PUSH: CPT

## 2025-01-31 RX ORDER — MEPERIDINE HYDROCHLORIDE 50 MG/ML
25 INJECTION INTRAMUSCULAR; INTRAVENOUS; SUBCUTANEOUS
OUTPATIENT
Start: 2025-01-31

## 2025-01-31 RX ORDER — METHYLPREDNISOLONE SODIUM SUCCINATE 125 MG/2ML
100 INJECTION, POWDER, LYOPHILIZED, FOR SOLUTION INTRAMUSCULAR; INTRAVENOUS ONCE
Status: COMPLETED | OUTPATIENT
Start: 2025-01-31 | End: 2025-01-31

## 2025-01-31 RX ORDER — FAMOTIDINE 10 MG/ML
20 INJECTION, SOLUTION INTRAVENOUS AS NEEDED
OUTPATIENT
Start: 2025-01-31

## 2025-01-31 RX ORDER — SODIUM CHLORIDE 9 MG/ML
250 INJECTION, SOLUTION INTRAVENOUS ONCE
Status: CANCELLED | OUTPATIENT
Start: 2025-01-31

## 2025-01-31 RX ORDER — HYDROCORTISONE SODIUM SUCCINATE 100 MG/2ML
100 INJECTION INTRAMUSCULAR; INTRAVENOUS AS NEEDED
OUTPATIENT
Start: 2025-01-31

## 2025-01-31 RX ORDER — ACETAMINOPHEN 325 MG/1
650 TABLET ORAL ONCE
Status: COMPLETED | OUTPATIENT
Start: 2025-01-31 | End: 2025-01-31

## 2025-01-31 RX ORDER — DIPHENHYDRAMINE HYDROCHLORIDE 50 MG/ML
25 INJECTION INTRAMUSCULAR; INTRAVENOUS ONCE
Status: CANCELLED | OUTPATIENT
Start: 2025-01-31

## 2025-01-31 RX ORDER — DIPHENHYDRAMINE HYDROCHLORIDE 50 MG/ML
25 INJECTION INTRAMUSCULAR; INTRAVENOUS ONCE
Status: COMPLETED | OUTPATIENT
Start: 2025-01-31 | End: 2025-01-31

## 2025-01-31 RX ORDER — DIPHENHYDRAMINE HYDROCHLORIDE 50 MG/ML
50 INJECTION INTRAMUSCULAR; INTRAVENOUS AS NEEDED
OUTPATIENT
Start: 2025-01-31

## 2025-01-31 RX ORDER — ONDANSETRON 4 MG/1
4 TABLET, ORALLY DISINTEGRATING ORAL
Start: 2025-01-31

## 2025-01-31 RX ORDER — SODIUM CHLORIDE 9 MG/ML
250 INJECTION, SOLUTION INTRAVENOUS ONCE
Status: DISCONTINUED | OUTPATIENT
Start: 2025-01-31 | End: 2025-02-02 | Stop reason: HOSPADM

## 2025-01-31 RX ORDER — ONDANSETRON 2 MG/ML
4 INJECTION INTRAMUSCULAR; INTRAVENOUS ONCE AS NEEDED
Start: 2025-01-31

## 2025-01-31 RX ORDER — ACETAMINOPHEN 325 MG/1
650 TABLET ORAL ONCE
Status: CANCELLED | OUTPATIENT
Start: 2025-01-31

## 2025-01-31 RX ORDER — METHYLPREDNISOLONE SODIUM SUCCINATE 125 MG/2ML
100 INJECTION, POWDER, LYOPHILIZED, FOR SOLUTION INTRAMUSCULAR; INTRAVENOUS ONCE
Status: CANCELLED | OUTPATIENT
Start: 2025-01-31

## 2025-01-31 RX ADMIN — DIPHENHYDRAMINE HYDROCHLORIDE 25 MG: 50 INJECTION, SOLUTION INTRAMUSCULAR; INTRAVENOUS at 09:47

## 2025-01-31 RX ADMIN — ACETAMINOPHEN 650 MG: 325 TABLET ORAL at 09:47

## 2025-01-31 RX ADMIN — OCRELIZUMAB 600 MG: 300 INJECTION INTRAVENOUS at 10:11

## 2025-01-31 RX ADMIN — METHYLPREDNISOLONE SODIUM SUCCINATE 100 MG: 125 INJECTION, POWDER, FOR SOLUTION INTRAMUSCULAR; INTRAVENOUS at 09:48

## 2025-02-03 DIAGNOSIS — R39.15 URGENCY OF URINATION: ICD-10-CM

## 2025-02-04 RX ORDER — MIRABEGRON 50 MG/1
50 TABLET, FILM COATED, EXTENDED RELEASE ORAL DAILY
Qty: 90 TABLET | Refills: 3 | Status: SHIPPED | OUTPATIENT
Start: 2025-02-04

## 2025-02-05 ENCOUNTER — TRANSCRIBE ORDERS (OUTPATIENT)
Dept: ADMINISTRATIVE | Facility: HOSPITAL | Age: 60
End: 2025-02-05
Payer: OTHER GOVERNMENT

## 2025-02-05 DIAGNOSIS — G35 MULTIPLE SCLEROSIS: Primary | ICD-10-CM

## 2025-02-19 ENCOUNTER — TELEMEDICINE (OUTPATIENT)
Dept: FAMILY MEDICINE CLINIC | Facility: CLINIC | Age: 60
End: 2025-02-19
Payer: OTHER GOVERNMENT

## 2025-02-19 DIAGNOSIS — F41.9 ANXIETY: ICD-10-CM

## 2025-02-19 DIAGNOSIS — G35 MS (MULTIPLE SCLEROSIS): ICD-10-CM

## 2025-02-19 DIAGNOSIS — E78.5 HYPERLIPIDEMIA, UNSPECIFIED HYPERLIPIDEMIA TYPE: Primary | ICD-10-CM

## 2025-02-19 DIAGNOSIS — I10 PRIMARY HYPERTENSION: ICD-10-CM

## 2025-02-19 DIAGNOSIS — F33.0 MAJOR DEPRESSIVE DISORDER, RECURRENT, MILD: ICD-10-CM

## 2025-02-19 DIAGNOSIS — N52.9 ERECTILE DYSFUNCTION, UNSPECIFIED ERECTILE DYSFUNCTION TYPE: ICD-10-CM

## 2025-02-19 DIAGNOSIS — G62.9 NEUROPATHY: ICD-10-CM

## 2025-02-19 DIAGNOSIS — G43.909 MIGRAINE WITHOUT STATUS MIGRAINOSUS, NOT INTRACTABLE, UNSPECIFIED MIGRAINE TYPE: ICD-10-CM

## 2025-02-19 PROCEDURE — 99214 OFFICE O/P EST MOD 30 MIN: CPT | Performed by: NURSE PRACTITIONER

## 2025-02-19 RX ORDER — DUPILUMAB 300 MG/2ML
300 INJECTION, SOLUTION SUBCUTANEOUS
COMMUNITY
Start: 2024-11-11

## 2025-03-05 ENCOUNTER — LAB (OUTPATIENT)
Dept: LAB | Facility: HOSPITAL | Age: 60
End: 2025-03-05
Payer: OTHER GOVERNMENT

## 2025-03-05 DIAGNOSIS — E78.5 HYPERLIPIDEMIA, UNSPECIFIED HYPERLIPIDEMIA TYPE: ICD-10-CM

## 2025-03-05 LAB
CHOLEST SERPL-MCNC: 116 MG/DL (ref 0–200)
HDLC SERPL-MCNC: 30 MG/DL (ref 40–60)
LDLC SERPL CALC-MCNC: 67 MG/DL (ref 0–100)
LDLC/HDLC SERPL: 2.19 {RATIO}
TRIGL SERPL-MCNC: 102 MG/DL (ref 0–150)
VLDLC SERPL-MCNC: 19 MG/DL (ref 5–40)

## 2025-03-05 PROCEDURE — 36415 COLL VENOUS BLD VENIPUNCTURE: CPT

## 2025-03-05 PROCEDURE — 80061 LIPID PANEL: CPT

## 2025-03-07 ENCOUNTER — HOSPITAL ENCOUNTER (OUTPATIENT)
Dept: MRI IMAGING | Facility: HOSPITAL | Age: 60
Discharge: HOME | End: 2025-03-07
Payer: OTHER GOVERNMENT

## 2025-03-07 DIAGNOSIS — G35 MULTIPLE SCLEROSIS: ICD-10-CM

## 2025-03-07 PROCEDURE — 72141 MRI NECK SPINE W/O DYE: CPT

## 2025-03-07 PROCEDURE — 70551 MRI BRAIN STEM W/O DYE: CPT

## 2025-04-29 ENCOUNTER — OFFICE VISIT (OUTPATIENT)
Dept: UROLOGY | Age: 60
End: 2025-04-29
Payer: OTHER GOVERNMENT

## 2025-04-29 VITALS — HEIGHT: 70 IN | WEIGHT: 226 LBS | BODY MASS INDEX: 32.35 KG/M2

## 2025-04-29 DIAGNOSIS — N40.1 BENIGN PROSTATIC HYPERPLASIA WITH LOWER URINARY TRACT SYMPTOMS, SYMPTOM DETAILS UNSPECIFIED: ICD-10-CM

## 2025-04-29 DIAGNOSIS — R39.198 SLOW URINARY STREAM: ICD-10-CM

## 2025-04-29 DIAGNOSIS — N32.81 OVERACTIVE BLADDER: Primary | ICD-10-CM

## 2025-04-29 DIAGNOSIS — N39.41 URGE INCONTINENCE: ICD-10-CM

## 2025-04-29 PROBLEM — Z96.651 S/P TKR (TOTAL KNEE REPLACEMENT) USING CEMENT, RIGHT: Status: RESOLVED | Noted: 2017-12-28 | Resolved: 2025-04-29

## 2025-04-29 PROBLEM — Z12.11 COLON CANCER SCREENING: Status: RESOLVED | Noted: 2022-12-20 | Resolved: 2025-04-29

## 2025-04-29 PROBLEM — G62.9 NEUROPATHY: Status: ACTIVE | Noted: 2025-04-29

## 2025-04-29 PROBLEM — R90.82 WHITE MATTER ABNORMALITY ON MRI OF BRAIN: Status: RESOLVED | Noted: 2022-09-01 | Resolved: 2025-04-29

## 2025-04-29 PROBLEM — Z77.098: Status: ACTIVE | Noted: 2025-04-29

## 2025-04-29 PROBLEM — F43.22 ADJUSTMENT DISORDER WITH ANXIETY: Status: ACTIVE | Noted: 2025-04-29

## 2025-04-29 PROBLEM — G47.33 OSA (OBSTRUCTIVE SLEEP APNEA): Status: RESOLVED | Noted: 2022-06-27 | Resolved: 2025-04-29

## 2025-04-29 PROBLEM — G44.86 CERVICOGENIC HEADACHE: Status: RESOLVED | Noted: 2022-09-01 | Resolved: 2025-04-29

## 2025-04-29 LAB — URINE VOLUME: 52

## 2025-04-29 RX ORDER — TAMSULOSIN HYDROCHLORIDE 0.4 MG/1
1 CAPSULE ORAL DAILY
Qty: 90 CAPSULE | Refills: 4 | Status: SHIPPED | OUTPATIENT
Start: 2025-04-29

## 2025-04-29 NOTE — PROGRESS NOTES
Chief Complaint: Follow-up (Over active bladder /3 month f/u. Patient states that he is unable to void today for his appointment. He denies any urinary issues or complaints. He states that the flomax and myrbetriq are still helping but her states that every few weeks he has a period where he is having urge to urination and his stream is week, but then it will go back to normal. )    Subjective         History of Present Illness  Jai Dow is a 60 y.o. male presents to [facility] to be seen for f/u oab.    Patient was last seen by Dr. Anderson on 10/29/2024 after undergoing a cystoscopy in July 2020 for he was noted to have a 2 cm prostate with no median lobe moderate trabeculations and no pathology.    Patient is on tamsulosin 0.4 mg daily since July 2024 and has remained on Myrbetriq 50 mg daily.  Justin did briefly discuss a TURP and the patient was not currently interested at the time.    States every 2 weeks or so he will have 3 days period of incontinence and struggling to void.    Overall better with tamsulosin and myrbetriq.     Unable to void in office today however PVR is only 52 mL.    PSA in 8/24 was noted to be 1.74    Symptoms do coincide with his injections for MS.            Objective     Past Medical History:   Diagnosis Date    Acoustic neuroma 08/08/2023    Benign prostatic hyperplasia with lower urinary tract symptoms 10/29/2024    Cluster headache 2003    [facility]    Difficulty walking 2018    Knee replacements    Dizziness 1990 and 2003    Deployments to [place] and [place]    Erectile dysfunction June 2022    Taking Sildenafil    Head injury 2001    In the [employer] in the back of a track vehicle    Headache, tension-type 2003    [facility]    Hypertension 2006    Memory loss 2001    Increasing, memory loss Family members names, co-workers, recent projects    Migraine     Migraine     Multiple sclerosis 12/22/2022    PTSD (post-traumatic stress disorder)     Sleep apnea      Stop using the machine/felt like I couldn’t breath  during nightmares    Tinnitus     1990 and 2003    Urinary incontinence 2023    Taking  Oxybutynin    Vision loss 2022    Blurred       Past Surgical History:   Procedure Laterality Date    COLONOSCOPY  09/2015    COLONOSCOPY N/A 08/07/2023    Procedure: COLONOSCOPY;  Surgeon: Alexis Bazzi MD;  Location: [facility] ENDOSCOPY;  Service: Gastroenterology;  Laterality: N/A;  HEMORRHOIDS    KNEE ACL RECONSTRUCTION      MENISCECTOMY      REPLACEMENT TOTAL KNEE Bilateral     VASECTOMY  June 2004         Current Outpatient Medications:     amantadine (SYMMETREL) 100 MG capsule, Take 1 capsule by mouth 2 (Two) Times a Day., Disp: , Rfl:     atorvastatin (LIPITOR) 10 MG tablet, Take 1 tablet by mouth Daily., Disp: , Rfl:     clobetasol (TEMOVATE) 0.05 % ointment, , Disp: , Rfl:     cyclobenzaprine (FLEXERIL) 10 MG tablet, Take 1 tablet by mouth 3 (Three) Times a Day As Needed., Disp: , Rfl:     Dalfampridine ER 10 MG tablet sustained-release 12 hour, Take 10 mg by mouth 2 (Two) Times a Day., Disp: 180 tablet, Rfl: 3    donepezil (ARICEPT) 23 MG tablet, Take 1 tablet by mouth 2 (Two) Times a Day., Disp: , Rfl:     Dupilumab (Dupixent) 300 MG/2ML solution auto-injector injection, Inject 2 mL under the skin into the appropriate area as directed Every 14 (Fourteen) Days., Disp: , Rfl:     Emollient (AQUAPHOR OINTMENT BODY EX), Apply  topically., Disp: , Rfl:     Fremanezumab-vfrm (Ajovy) 225 MG/1.5ML solution auto-injector, Inject 225 mg under the skin into the appropriate area as directed Every 28 (Twenty-Eight) Days., Disp: 4.5 mL, Rfl: 3    hydroCHLOROthiazide 25 MG tablet, Take 1 tablet by mouth Daily., Disp: 90 tablet, Rfl: 1    hydrocortisone 2.5 % cream, , Disp: , Rfl:     lisinopril (PRINIVIL,ZESTRIL) 40 MG tablet, Take 1 tablet by mouth Daily., Disp: 90 tablet, Rfl: 1    Magnesium 125 MG capsule, , Disp: , Rfl:     Mirabegron ER (Myrbetriq) 50 MG tablet  sustained-release 24 hour 24 hr tablet, Take 50 mg by mouth Daily., Disp: 90 tablet, Rfl: 3    mupirocin (BACTROBAN) 2 % ointment, Apply  topically to the appropriate area as directed 3 (Three) Times a Day. apply topically to the affected area three times daily, Disp: , Rfl:     naproxen sodium (ANAPROX) 275 MG tablet, Take 1 tablet by mouth 2 (Two) Times a Day With Meals., Disp: , Rfl:     Ocrelizumab (OCREVUS IV), , Disp: , Rfl:     prazosin (MINIPRESS) 2 MG capsule, Take 1 capsule by mouth Daily As Needed., Disp: , Rfl:     pregabalin (Lyrica) 150 MG capsule, Take 1 capsule by mouth 2 (Two) Times a Day., Disp: 60 capsule, Rfl: 2    Rimegepant Sulfate (Nurtec) 75 MG tablet dispersible tablet, Take 1 tablet by mouth Take As Directed., Disp: , Rfl:     Sennosides 8.6 MG capsule, Take 8.6 mL by mouth Every Night., Disp: , Rfl:     sertraline (ZOLOFT) 100 MG tablet, 2 tablets., Disp: , Rfl:     sildenafil (Viagra) 25 MG tablet, Take 1 tablet by mouth Daily As Needed for Erectile Dysfunction., Disp: 30 tablet, Rfl: 1    tamsulosin (FLOMAX) 0.4 MG capsule 24 hr capsule, Take 1 capsule by mouth Daily., Disp: 90 capsule, Rfl: 4    urea (CARMOL) 20 % cream, Apply 1 Application topically to the appropriate area as directed As Needed for Dry Skin., Disp: , Rfl:     Current Facility-Administered Medications:     Fremanezumab-vfrm solution prefilled syringe 225 mg, 225 mg, Subcutaneous, Once, OropillaLeopoldo MD    Allergies   Allergen Reactions    Amoxil [Amoxicillin] Angioedema        Family History   Problem Relation Age of Onset    Dementia Mother     Cancer Mother         Breast Cancer    Stroke Father     Hypertension Father     Osteoarthritis Father     Stroke Brother     Migraines Brother        Social History     Socioeconomic History    Marital status:    Tobacco Use    Smoking status: Former     Current packs/day: 0.00     Average packs/day: 0.5 packs/day for 10.2 years (5.1 ttl pk-yrs)     Types:  "Cigarettes     Start date: 3/31/1980     Quit date: 6/10/1990     Years since quittin.9     Passive exposure: Past    Smokeless tobacco: Never   Vaping Use    Vaping status: Never Used   Substance and Sexual Activity    Alcohol use: Not Currently     Alcohol/week: 5.0 - 9.0 standard drinks of alcohol     Types: 1 - 2 Cans of beer, 4 - 7 Shots of liquor per week     Comment: Drank everyday after the [place] War,  [place] and [place]    Drug use: Never    Sexual activity: Yes     Partners: Female     Birth control/protection: None, Vasectomy     Comment: N/A       Vital Signs:   Ht 176.5 cm (69.5\")   Wt 103 kg (226 lb)   BMI 32.90 kg/m²      Physical Exam     Result Review :   The following data was reviewed by: SARATH Gottlieb on 2025:  Results for orders placed or performed in visit on 25   Bladder Scan    Collection Time: 25 11:02 AM   Result Value Ref Range    Urine Volume 52       PSA          2024    11:41   PSA   PSA 1.740      Bladder Scan interpretation 2025    Estimation of residual urine via BVI 3000 [employer] Bladder Scan  MA/nurse performing: kenya mcdonald Ma   Residual Urine: 52 ml  Indication: Overactive bladder    Urge incontinence    Slow urinary stream    Benign prostatic hyperplasia with lower urinary tract symptoms, symptom details unspecified   Position: Supine  Examination: Incremental scanning of the suprapubic area using 2.0 MHz transducer using copious amounts of acoustic gel.   Findings: An anechoic area was demonstrated which represented the bladder, with measurement of residual urine as noted. I inspected this myself. In that the residual urine was stable or insignificant, refer to plan for treatment and plan necessary at this time.          Procedures        Assessment and Plan    Diagnoses and all orders for this visit:    1. Overactive bladder (Primary)  -     Cancel: POC Urinalysis Dipstick, Automated  -     Bladder Scan  -     tamsulosin (FLOMAX) 0.4 MG " capsule 24 hr capsule; Take 1 capsule by mouth Daily.  Dispense: 90 capsule; Refill: 4    2. Urge incontinence    3. Slow urinary stream    4. Benign prostatic hyperplasia with lower urinary tract symptoms, symptom details unspecified      Patient symptoms are fairly well controlled.     Discussed that his symptoms may coincide with his injections for MS and there is not much we can do to mitigate those side effects with such a short duration.    We discussed possible implementation of oxybutynin ir for control during these times the concern would be fore risk of retention. Pt state he will deal with symptoms as they come.      Will continue myrbetriq, and tamsulosin.     He will call with any s/s of UTI.      I spent 10 minutes caring for Jai on this date of service. This time includes time spent by me in the following activities:reviewing tests, obtaining and/or reviewing a separately obtained history, performing a medically appropriate examination and/or evaluation , counseling and educating the patient/family/caregiver, ordering medications, tests, or procedures, and documenting information in the medical record  Follow Up   Return in about 9 months (around 1/29/2026) for annual f/u bph/ oab with  PVR .  Patient was given instructions and counseling regarding his condition or for health maintenance advice. Please see specific information pulled into the AVS if appropriate.         This document has been electronically signed by SARATH Gottlieb  April 29, 2025 11:17 EDT

## 2025-06-17 ENCOUNTER — TRANSCRIBE ORDERS (OUTPATIENT)
Dept: ADMINISTRATIVE | Facility: HOSPITAL | Age: 60
End: 2025-06-17
Payer: OTHER GOVERNMENT

## 2025-06-17 DIAGNOSIS — G35 MULTIPLE SCLEROSIS: Primary | ICD-10-CM

## 2025-07-23 DIAGNOSIS — G35 MULTIPLE SCLEROSIS: Primary | ICD-10-CM

## 2025-07-23 RX ORDER — DIPHENHYDRAMINE HCL 50 MG
50 CAPSULE ORAL ONCE
Start: 2025-07-31 | End: 2025-07-31

## 2025-07-23 RX ORDER — MEPERIDINE HYDROCHLORIDE 50 MG/ML
25 INJECTION INTRAMUSCULAR; INTRAVENOUS; SUBCUTANEOUS
OUTPATIENT
Start: 2025-07-31

## 2025-07-23 RX ORDER — FAMOTIDINE 10 MG/ML
20 INJECTION, SOLUTION INTRAVENOUS AS NEEDED
OUTPATIENT
Start: 2025-07-31

## 2025-07-23 RX ORDER — HYDROCORTISONE SODIUM SUCCINATE 100 MG/2ML
100 INJECTION INTRAMUSCULAR; INTRAVENOUS AS NEEDED
OUTPATIENT
Start: 2025-07-31

## 2025-07-23 RX ORDER — ACETAMINOPHEN 325 MG/1
650 TABLET ORAL ONCE
Start: 2025-07-31 | End: 2025-07-31

## 2025-07-23 RX ORDER — ONDANSETRON 4 MG/1
4 TABLET, ORALLY DISINTEGRATING ORAL AS NEEDED
Start: 2025-07-31

## 2025-07-23 RX ORDER — ONDANSETRON 2 MG/ML
4 INJECTION INTRAMUSCULAR; INTRAVENOUS ONCE AS NEEDED
Start: 2025-07-31

## 2025-07-23 RX ORDER — DIPHENHYDRAMINE HYDROCHLORIDE 50 MG/ML
50 INJECTION, SOLUTION INTRAMUSCULAR; INTRAVENOUS AS NEEDED
OUTPATIENT
Start: 2025-07-31

## 2025-08-01 ENCOUNTER — HOSPITAL ENCOUNTER (OUTPATIENT)
Dept: INFUSION THERAPY | Facility: HOSPITAL | Age: 60
Discharge: HOME | End: 2025-08-01
Payer: OTHER GOVERNMENT

## 2025-08-01 VITALS
RESPIRATION RATE: 16 BRPM | HEIGHT: 69 IN | BODY MASS INDEX: 34.81 KG/M2 | TEMPERATURE: 97.8 F | WEIGHT: 235.01 LBS | DIASTOLIC BLOOD PRESSURE: 83 MMHG | SYSTOLIC BLOOD PRESSURE: 140 MMHG | HEART RATE: 70 BPM | OXYGEN SATURATION: 100 %

## 2025-08-01 DIAGNOSIS — G35 MULTIPLE SCLEROSIS: Primary | ICD-10-CM

## 2025-08-01 PROCEDURE — 25010000002 OCRELIZUMAB 300 MG/10ML SOLUTION 300 MG VIAL: Performed by: PSYCHIATRY & NEUROLOGY

## 2025-08-01 PROCEDURE — 25010000002 METHYLPREDNISOLONE PER 125 MG: Performed by: PSYCHIATRY & NEUROLOGY

## 2025-08-01 PROCEDURE — 96374 THER/PROPH/DIAG INJ IV PUSH: CPT

## 2025-08-01 PROCEDURE — 96365 THER/PROPH/DIAG IV INF INIT: CPT

## 2025-08-01 PROCEDURE — 63710000001 DIPHENHYDRAMINE PER 50 MG: Performed by: PSYCHIATRY & NEUROLOGY

## 2025-08-01 PROCEDURE — 25810000003 SODIUM CHLORIDE 0.9 % SOLUTION 500 ML FLEX CONT: Performed by: PSYCHIATRY & NEUROLOGY

## 2025-08-01 PROCEDURE — 96366 THER/PROPH/DIAG IV INF ADDON: CPT

## 2025-08-01 RX ORDER — ONDANSETRON 2 MG/ML
4 INJECTION INTRAMUSCULAR; INTRAVENOUS ONCE AS NEEDED
Start: 2025-08-01

## 2025-08-01 RX ORDER — FAMOTIDINE 10 MG/ML
20 INJECTION, SOLUTION INTRAVENOUS AS NEEDED
OUTPATIENT
Start: 2025-08-01

## 2025-08-01 RX ORDER — ACETAMINOPHEN 325 MG/1
650 TABLET ORAL ONCE
Status: CANCELLED
Start: 2025-08-01 | End: 2025-08-01

## 2025-08-01 RX ORDER — MEPERIDINE HYDROCHLORIDE 50 MG/ML
25 INJECTION INTRAMUSCULAR; INTRAVENOUS; SUBCUTANEOUS
OUTPATIENT
Start: 2025-08-01

## 2025-08-01 RX ORDER — DIPHENHYDRAMINE HYDROCHLORIDE 50 MG/ML
50 INJECTION, SOLUTION INTRAMUSCULAR; INTRAVENOUS AS NEEDED
OUTPATIENT
Start: 2025-08-01

## 2025-08-01 RX ORDER — HYDROCORTISONE SODIUM SUCCINATE 100 MG/2ML
100 INJECTION INTRAMUSCULAR; INTRAVENOUS AS NEEDED
OUTPATIENT
Start: 2025-08-01

## 2025-08-01 RX ORDER — DIPHENHYDRAMINE HCL 25 MG
50 CAPSULE ORAL ONCE
Status: CANCELLED
Start: 2025-08-01 | End: 2025-08-01

## 2025-08-01 RX ORDER — ACETAMINOPHEN 325 MG/1
650 TABLET ORAL ONCE
Status: COMPLETED | OUTPATIENT
Start: 2025-08-01 | End: 2025-08-01

## 2025-08-01 RX ORDER — DIPHENHYDRAMINE HCL 25 MG
50 CAPSULE ORAL ONCE
Status: COMPLETED | OUTPATIENT
Start: 2025-08-01 | End: 2025-08-01

## 2025-08-01 RX ORDER — ONDANSETRON 4 MG/1
4 TABLET, ORALLY DISINTEGRATING ORAL AS NEEDED
Start: 2025-08-01

## 2025-08-01 RX ADMIN — ACETAMINOPHEN 650 MG: 325 TABLET ORAL at 13:20

## 2025-08-01 RX ADMIN — DIPHENHYDRAMINE HYDROCHLORIDE 50 MG: 25 CAPSULE ORAL at 13:20

## 2025-08-01 RX ADMIN — METHYLPREDNISOLONE SODIUM SUCCINATE 125 MG: 125 INJECTION, POWDER, LYOPHILIZED, FOR SOLUTION INTRAMUSCULAR; INTRAVENOUS at 13:21

## 2025-08-01 RX ADMIN — SODIUM CHLORIDE 600 MG: 9 INJECTION, SOLUTION INTRAVENOUS at 13:33

## (undated) DEVICE — SOLIDIFIER LIQLOC PLS 1500CC BT

## (undated) DEVICE — SOL IRRG H2O PL/BG 1000ML STRL

## (undated) DEVICE — Device: Brand: DEFENDO AIR/WATER/SUCTION AND BIOPSY VALVE

## (undated) DEVICE — Device